# Patient Record
Sex: FEMALE | Race: WHITE | NOT HISPANIC OR LATINO | Employment: FULL TIME | URBAN - METROPOLITAN AREA
[De-identification: names, ages, dates, MRNs, and addresses within clinical notes are randomized per-mention and may not be internally consistent; named-entity substitution may affect disease eponyms.]

---

## 2022-01-01 ENCOUNTER — EXTERNAL RECORD (OUTPATIENT)
Dept: OTHER | Age: 27
End: 2022-01-01

## 2022-02-08 LAB
SARS-COV-2 AG UPPER RESP QL IA.RAPID: NEGATIVE
SARS-COV-2 RNA SPEC QL NAA+PROBE: NEGATIVE
SPECIMEN SOURCE: NORMAL
SPECIMEN SOURCE: NORMAL

## 2022-02-11 ENCOUNTER — ANESTHESIA (OUTPATIENT)
Dept: GASTROENTEROLOGY | Age: 27
End: 2022-02-11

## 2022-02-11 ENCOUNTER — HOSPITAL ENCOUNTER (OUTPATIENT)
Dept: GASTROENTEROLOGY | Age: 27
Discharge: HOME | End: 2022-02-11
Attending: INTERNAL MEDICINE

## 2022-02-11 ENCOUNTER — ANESTHESIA EVENT (OUTPATIENT)
Dept: GASTROENTEROLOGY | Age: 27
End: 2022-02-11

## 2022-02-11 VITALS
HEIGHT: 65 IN | SYSTOLIC BLOOD PRESSURE: 125 MMHG | OXYGEN SATURATION: 98 % | HEART RATE: 64 BPM | BODY MASS INDEX: 34.99 KG/M2 | DIASTOLIC BLOOD PRESSURE: 59 MMHG | TEMPERATURE: 97 F | RESPIRATION RATE: 20 BRPM | WEIGHT: 210 LBS

## 2022-02-11 DIAGNOSIS — R11.0 NAUSEA: ICD-10-CM

## 2022-02-11 DIAGNOSIS — R19.7 DIARRHEA, UNSPECIFIED TYPE: ICD-10-CM

## 2022-02-11 DIAGNOSIS — R10.84 ABDOMINAL PAIN, GENERALIZED: ICD-10-CM

## 2022-02-11 DIAGNOSIS — K21.9 GERD WITHOUT ESOPHAGITIS: ICD-10-CM

## 2022-02-11 DIAGNOSIS — R13.10 DYSPHAGIA, UNSPECIFIED TYPE: ICD-10-CM

## 2022-02-11 LAB
B-HCG UR QL: NEGATIVE
INTERNAL PROCEDURAL CONTROLS ACCEPTABLE: YES

## 2022-02-11 PROCEDURE — 13000028 HB GI MAJOR COMPLEX CASE S/U + 1ST 15 MIN

## 2022-02-11 PROCEDURE — 10002807 HB RX 258

## 2022-02-11 PROCEDURE — 10002800 HB RX 250 W HCPCS: Performed by: ANESTHESIOLOGY

## 2022-02-11 PROCEDURE — 88305 TISSUE EXAM BY PATHOLOGIST: CPT | Performed by: INTERNAL MEDICINE

## 2022-02-11 PROCEDURE — 81025 URINE PREGNANCY TEST: CPT | Performed by: INTERNAL MEDICINE

## 2022-02-11 PROCEDURE — 13000029 HB GI MAJOR COMPLEX CASE EA ADD MINUTE

## 2022-02-11 PROCEDURE — 13000001 HB PHASE II RECOVERY EA 30 MINUTES

## 2022-02-11 PROCEDURE — 10002807 HB RX 258: Performed by: ANESTHESIOLOGY

## 2022-02-11 PROCEDURE — 10002801 HB RX 250 W/O HCPCS: Performed by: ANESTHESIOLOGY

## 2022-02-11 PROCEDURE — 13000004 HB  ANESTHESIA  GENERAL OUTSIDE OR

## 2022-02-11 RX ORDER — MECLIZINE HYDROCHLORIDE 25 MG/1
25 TABLET ORAL
COMMUNITY

## 2022-02-11 RX ORDER — SODIUM CHLORIDE, SODIUM LACTATE, POTASSIUM CHLORIDE, CALCIUM CHLORIDE 600; 310; 30; 20 MG/100ML; MG/100ML; MG/100ML; MG/100ML
INJECTION, SOLUTION INTRAVENOUS CONTINUOUS PRN
Status: DISCONTINUED | OUTPATIENT
Start: 2022-02-11 | End: 2022-02-11

## 2022-02-11 RX ORDER — ACETAMINOPHEN 325 MG/1
650 TABLET ORAL EVERY 4 HOURS PRN
Status: DISCONTINUED | OUTPATIENT
Start: 2022-02-11 | End: 2022-02-13 | Stop reason: HOSPADM

## 2022-02-11 RX ORDER — SODIUM CHLORIDE, SODIUM LACTATE, POTASSIUM CHLORIDE, CALCIUM CHLORIDE 600; 310; 30; 20 MG/100ML; MG/100ML; MG/100ML; MG/100ML
INJECTION, SOLUTION INTRAVENOUS
Status: COMPLETED
Start: 2022-02-11 | End: 2022-02-11

## 2022-02-11 RX ORDER — ACETAMINOPHEN 650 MG/1
650 SUPPOSITORY RECTAL EVERY 4 HOURS PRN
Status: DISCONTINUED | OUTPATIENT
Start: 2022-02-11 | End: 2022-02-13 | Stop reason: HOSPADM

## 2022-02-11 RX ORDER — SUMATRIPTAN 100 MG/1
100 TABLET, FILM COATED ORAL
COMMUNITY

## 2022-02-11 RX ORDER — 0.9 % SODIUM CHLORIDE 0.9 %
2 VIAL (ML) INJECTION EVERY 12 HOURS SCHEDULED
Status: CANCELLED | OUTPATIENT
Start: 2022-02-11

## 2022-02-11 RX ORDER — PROPOFOL 10 MG/ML
INJECTION, EMULSION INTRAVENOUS PRN
Status: DISCONTINUED | OUTPATIENT
Start: 2022-02-11 | End: 2022-02-11

## 2022-02-11 RX ORDER — SODIUM CHLORIDE, SODIUM LACTATE, POTASSIUM CHLORIDE, CALCIUM CHLORIDE 600; 310; 30; 20 MG/100ML; MG/100ML; MG/100ML; MG/100ML
INJECTION, SOLUTION INTRAVENOUS ONCE
Status: COMPLETED | OUTPATIENT
Start: 2022-02-11 | End: 2022-02-11

## 2022-02-11 RX ORDER — CYCLOBENZAPRINE HCL 5 MG
5 TABLET ORAL
COMMUNITY

## 2022-02-11 RX ORDER — TOPIRAMATE 50 MG/1
50 TABLET, FILM COATED ORAL
COMMUNITY

## 2022-02-11 RX ORDER — SODIUM CHLORIDE, SODIUM LACTATE, POTASSIUM CHLORIDE, CALCIUM CHLORIDE 600; 310; 30; 20 MG/100ML; MG/100ML; MG/100ML; MG/100ML
INJECTION, SOLUTION INTRAVENOUS CONTINUOUS
Status: CANCELLED | OUTPATIENT
Start: 2022-02-11

## 2022-02-11 RX ORDER — LIDOCAINE HYDROCHLORIDE 10 MG/ML
INJECTION, SOLUTION INFILTRATION; PERINEURAL PRN
Status: DISCONTINUED | OUTPATIENT
Start: 2022-02-11 | End: 2022-02-11

## 2022-02-11 RX ORDER — LEVOTHYROXINE SODIUM 137 UG/1
137 TABLET ORAL DAILY
COMMUNITY

## 2022-02-11 RX ORDER — PROCHLORPERAZINE MALEATE 10 MG
10 TABLET ORAL
COMMUNITY

## 2022-02-11 RX ORDER — OMEPRAZOLE 10 MG/1
CAPSULE, DELAYED RELEASE ORAL
COMMUNITY

## 2022-02-11 RX ORDER — ONDANSETRON 2 MG/ML
4 INJECTION INTRAMUSCULAR; INTRAVENOUS 2 TIMES DAILY PRN
Status: DISCONTINUED | OUTPATIENT
Start: 2022-02-11 | End: 2022-02-13 | Stop reason: HOSPADM

## 2022-02-11 RX ADMIN — LIDOCAINE HYDROCHLORIDE 100 MG: 10 INJECTION, SOLUTION INFILTRATION; PERINEURAL at 08:28

## 2022-02-11 RX ADMIN — PROPOFOL 200 MG: 10 INJECTION, EMULSION INTRAVENOUS at 08:31

## 2022-02-11 RX ADMIN — SODIUM CHLORIDE, POTASSIUM CHLORIDE, SODIUM LACTATE AND CALCIUM CHLORIDE: 600; 310; 30; 20 INJECTION, SOLUTION INTRAVENOUS at 08:02

## 2022-02-11 RX ADMIN — SODIUM CHLORIDE, POTASSIUM CHLORIDE, SODIUM LACTATE AND CALCIUM CHLORIDE: 600; 310; 30; 20 INJECTION, SOLUTION INTRAVENOUS at 08:26

## 2022-02-11 RX ADMIN — SODIUM CHLORIDE, SODIUM LACTATE, POTASSIUM CHLORIDE, CALCIUM CHLORIDE: 600; 310; 30; 20 INJECTION, SOLUTION INTRAVENOUS at 08:02

## 2022-02-11 RX ADMIN — PROPOFOL 200 MCG/KG/MIN: 10 INJECTION, EMULSION INTRAVENOUS at 08:28

## 2022-02-11 SDOH — SOCIAL STABILITY: SOCIAL INSECURITY: RISK FACTORS: AGE

## 2022-02-11 ASSESSMENT — PAIN SCALES - GENERAL: PAINLEVEL_OUTOF10: 0

## 2022-02-11 ASSESSMENT — COPD QUESTIONNAIRES: COPD: 0

## 2022-02-11 ASSESSMENT — ACTIVITIES OF DAILY LIVING (ADL)
ADL_SCORE: 12
ADL_BEFORE_ADMISSION: INDEPENDENT
HISTORY OF FALLING IN THE LAST YEAR (PRIOR TO ADMISSION): NO

## 2022-02-11 ASSESSMENT — ENCOUNTER SYMPTOMS
SEIZURES: 0
HEADACHES: 1
EXERCISE TOLERANCE: GOOD (>4 METS)

## 2022-02-16 LAB
ASR DISCLAIMER: NORMAL
CASE RPRT: NORMAL
CLINICAL INFO: NORMAL
PATH REPORT.FINAL DX SPEC: NORMAL
PATH REPORT.GROSS SPEC: NORMAL

## 2022-02-17 ENCOUNTER — CLINICAL ABSTRACT (OUTPATIENT)
Dept: HEALTH INFORMATION MANAGEMENT | Facility: OTHER | Age: 27
End: 2022-02-17

## 2022-02-18 PROBLEM — F41.9 ANXIETY: Status: ACTIVE | Noted: 2022-02-18

## 2022-02-18 PROBLEM — G43.109 MIGRAINE WITH AURA AND WITHOUT STATUS MIGRAINOSUS, NOT INTRACTABLE: Status: ACTIVE | Noted: 2022-02-18

## 2022-02-18 NOTE — PROGRESS NOTES
Patient states headaches/migraines since sh was 5years old. Patient states 2-3 migraines a week. Patient states nausea or vomiting with migraines. Denies changes to speech or memory. Patient states decrease in balance. Denies recent falls. Denies recent head trauma. Patient states blurry vision. Pain in the eyes with migraines. Patient states migraines are starting in the neck area and will more upward towards the head.

## 2022-05-26 ENCOUNTER — TELEPHONE (OUTPATIENT)
Dept: NEUROLOGY | Facility: CLINIC | Age: 27
End: 2022-05-26

## 2022-06-10 NOTE — TELEPHONE ENCOUNTER
Per Epic review:      Authorization number: [id number]   Authorized from April 26, 2022 to May 26, 2023   Information entered manually     Informed pt via [address]

## 2022-06-16 ENCOUNTER — OFFICE VISIT (OUTPATIENT)
Dept: ENDOCRINOLOGY CLINIC | Facility: CLINIC | Age: 27
End: 2022-06-16
Payer: COMMERCIAL

## 2022-06-16 VITALS
WEIGHT: 232 LBS | DIASTOLIC BLOOD PRESSURE: 84 MMHG | BODY MASS INDEX: 39 KG/M2 | HEART RATE: 108 BPM | SYSTOLIC BLOOD PRESSURE: 124 MMHG

## 2022-06-16 DIAGNOSIS — E03.8 HYPOTHYROIDISM DUE TO HASHIMOTO'S THYROIDITIS: Primary | ICD-10-CM

## 2022-06-16 DIAGNOSIS — N91.4 SECONDARY OLIGOMENORRHEA: ICD-10-CM

## 2022-06-16 DIAGNOSIS — E06.3 HYPOTHYROIDISM DUE TO HASHIMOTO'S THYROIDITIS: Primary | ICD-10-CM

## 2022-06-16 PROCEDURE — 3079F DIAST BP 80-89 MM HG: CPT | Performed by: INTERNAL MEDICINE

## 2022-06-16 PROCEDURE — 99204 OFFICE O/P NEW MOD 45 MIN: CPT | Performed by: INTERNAL MEDICINE

## 2022-06-16 PROCEDURE — 3074F SYST BP LT 130 MM HG: CPT | Performed by: INTERNAL MEDICINE

## 2022-06-24 ENCOUNTER — LAB ENCOUNTER (OUTPATIENT)
Dept: LAB | Age: 27
End: 2022-06-24
Attending: INTERNAL MEDICINE
Payer: COMMERCIAL

## 2022-06-24 ENCOUNTER — HOSPITAL ENCOUNTER (OUTPATIENT)
Dept: ULTRASOUND IMAGING | Age: 27
Discharge: HOME | End: 2022-06-24
Attending: INTERNAL MEDICINE
Payer: COMMERCIAL

## 2022-06-24 DIAGNOSIS — E03.8 HYPOTHYROIDISM DUE TO HASHIMOTO'S THYROIDITIS: ICD-10-CM

## 2022-06-24 DIAGNOSIS — E06.3 HYPOTHYROIDISM DUE TO HASHIMOTO'S THYROIDITIS: ICD-10-CM

## 2022-06-24 DIAGNOSIS — N91.4 SECONDARY OLIGOMENORRHEA: ICD-10-CM

## 2022-06-24 LAB
ALBUMIN SERPL-MCNC: 3.7 G/DL (ref 3.4–5)
ALBUMIN/GLOB SERPL: 0.8 {RATIO} (ref 1–2)
ALP LIVER SERPL-CCNC: 111 U/L
ALT SERPL-CCNC: 83 U/L
ANION GAP SERPL CALC-SCNC: 4 MMOL/L (ref 0–18)
AST SERPL-CCNC: 40 U/L (ref 15–37)
BILIRUB SERPL-MCNC: 0.3 MG/DL (ref 0.1–2)
BUN BLD-MCNC: 10 MG/DL (ref 7–18)
BUN/CREAT SERPL: 12.8 (ref 10–20)
CALCIUM BLD-MCNC: 9.3 MG/DL (ref 8.5–10.1)
CHLORIDE SERPL-SCNC: 111 MMOL/L (ref 98–112)
CHOLEST SERPL-MCNC: 193 MG/DL (ref ?–200)
CO2 SERPL-SCNC: 24 MMOL/L (ref 21–32)
CORTIS SERPL-MCNC: 8.9 UG/DL
CREAT BLD-MCNC: 0.78 MG/DL
DHEA-S SERPL-MCNC: 78 UG/DL
FASTING PATIENT LIPID ANSWER: NO
FASTING STATUS PATIENT QL REPORTED: NO
GLOBULIN PLAS-MCNC: 4.4 G/DL (ref 2.8–4.4)
GLUCOSE BLD-MCNC: 107 MG/DL (ref 70–99)
HDLC SERPL-MCNC: 34 MG/DL (ref 40–59)
LDLC SERPL CALC-MCNC: 94 MG/DL (ref ?–100)
NONHDLC SERPL-MCNC: 159 MG/DL (ref ?–130)
OSMOLALITY SERPL CALC.SUM OF ELEC: 288 MOSM/KG (ref 275–295)
POTASSIUM SERPL-SCNC: 4 MMOL/L (ref 3.5–5.1)
PROLACTIN SERPL-MCNC: 12.4 NG/ML
PROT SERPL-MCNC: 8.1 G/DL (ref 6.4–8.2)
SODIUM SERPL-SCNC: 139 MMOL/L (ref 136–145)
T3FREE SERPL-MCNC: 2.46 PG/ML (ref 2.4–4.2)
T4 FREE SERPL-MCNC: 1 NG/DL (ref 0.8–1.7)
THYROPEROXIDASE AB SERPL-ACNC: 246 U/ML (ref ?–60)
TRIGL SERPL-MCNC: 387 MG/DL (ref 30–149)
TSI SER-ACNC: 2.14 MIU/ML (ref 0.36–3.74)
VIT D+METAB SERPL-MCNC: 38.4 NG/ML (ref 30–100)
VLDLC SERPL CALC-MCNC: 65 MG/DL (ref 0–30)

## 2022-06-24 PROCEDURE — 76536 US EXAM OF HEAD AND NECK: CPT | Performed by: INTERNAL MEDICINE

## 2022-06-24 PROCEDURE — 84443 ASSAY THYROID STIM HORMONE: CPT

## 2022-06-24 PROCEDURE — 80053 COMPREHEN METABOLIC PANEL: CPT

## 2022-06-24 PROCEDURE — 84402 ASSAY OF FREE TESTOSTERONE: CPT

## 2022-06-24 PROCEDURE — 36415 COLL VENOUS BLD VENIPUNCTURE: CPT

## 2022-06-24 PROCEDURE — 82306 VITAMIN D 25 HYDROXY: CPT

## 2022-06-24 PROCEDURE — 80061 LIPID PANEL: CPT

## 2022-06-24 PROCEDURE — 82627 DEHYDROEPIANDROSTERONE: CPT

## 2022-06-24 PROCEDURE — 82533 TOTAL CORTISOL: CPT

## 2022-06-24 PROCEDURE — 84439 ASSAY OF FREE THYROXINE: CPT

## 2022-06-24 PROCEDURE — 84403 ASSAY OF TOTAL TESTOSTERONE: CPT

## 2022-06-24 PROCEDURE — 86376 MICROSOMAL ANTIBODY EACH: CPT

## 2022-06-24 PROCEDURE — 84481 FREE ASSAY (FT-3): CPT

## 2022-06-24 PROCEDURE — 84146 ASSAY OF PROLACTIN: CPT

## 2022-07-11 LAB
TESTOSTERONE, FREE, S: 0.36 NG/DL
TESTOSTERONE, TOTAL, S: 11 NG/DL

## 2022-07-15 ENCOUNTER — OFFICE VISIT (OUTPATIENT)
Dept: ENDOCRINOLOGY CLINIC | Facility: CLINIC | Age: 27
End: 2022-07-15
Payer: COMMERCIAL

## 2022-07-15 VITALS — HEART RATE: 94 BPM | SYSTOLIC BLOOD PRESSURE: 129 MMHG | DIASTOLIC BLOOD PRESSURE: 85 MMHG

## 2022-07-15 DIAGNOSIS — E06.3 HYPOTHYROIDISM DUE TO HASHIMOTO'S THYROIDITIS: Primary | ICD-10-CM

## 2022-07-15 DIAGNOSIS — E88.81 METABOLIC SYNDROME: ICD-10-CM

## 2022-07-15 DIAGNOSIS — E03.8 HYPOTHYROIDISM DUE TO HASHIMOTO'S THYROIDITIS: Primary | ICD-10-CM

## 2022-07-15 DIAGNOSIS — R79.89 ELEVATED LFTS: ICD-10-CM

## 2022-07-15 PROCEDURE — 3074F SYST BP LT 130 MM HG: CPT | Performed by: INTERNAL MEDICINE

## 2022-07-15 PROCEDURE — 3079F DIAST BP 80-89 MM HG: CPT | Performed by: INTERNAL MEDICINE

## 2022-07-15 PROCEDURE — 99214 OFFICE O/P EST MOD 30 MIN: CPT | Performed by: INTERNAL MEDICINE

## 2022-07-15 RX ORDER — PHENTERMINE HYDROCHLORIDE 15 MG/1
15 CAPSULE ORAL EVERY MORNING
Qty: 30 CAPSULE | Refills: 2 | Status: SHIPPED | OUTPATIENT
Start: 2022-07-15

## 2022-07-15 RX ORDER — LEVOTHYROXINE SODIUM 137 UG/1
137 TABLET ORAL DAILY
Qty: 90 TABLET | Refills: 2 | Status: SHIPPED | OUTPATIENT
Start: 2022-07-15

## 2022-09-01 ENCOUNTER — OFFICE VISIT (OUTPATIENT)
Dept: NEUROLOGY | Facility: CLINIC | Age: 27
End: 2022-09-01
Payer: COMMERCIAL

## 2022-09-01 VITALS
HEART RATE: 94 BPM | RESPIRATION RATE: 16 BRPM | SYSTOLIC BLOOD PRESSURE: 126 MMHG | WEIGHT: 227 LBS | BODY MASS INDEX: 38 KG/M2 | DIASTOLIC BLOOD PRESSURE: 68 MMHG

## 2022-09-01 DIAGNOSIS — F41.9 ANXIETY: ICD-10-CM

## 2022-09-01 DIAGNOSIS — M54.2 CHRONIC NECK PAIN: ICD-10-CM

## 2022-09-01 DIAGNOSIS — G43.109 MIGRAINE WITH AURA AND WITHOUT STATUS MIGRAINOSUS, NOT INTRACTABLE: Primary | ICD-10-CM

## 2022-09-01 DIAGNOSIS — M26.609 TMJ (TEMPOROMANDIBULAR JOINT SYNDROME): ICD-10-CM

## 2022-09-01 DIAGNOSIS — G89.29 CHRONIC NECK PAIN: ICD-10-CM

## 2022-09-01 PROCEDURE — 3074F SYST BP LT 130 MM HG: CPT | Performed by: OTHER

## 2022-09-01 PROCEDURE — 3078F DIAST BP <80 MM HG: CPT | Performed by: OTHER

## 2022-09-01 PROCEDURE — 99214 OFFICE O/P EST MOD 30 MIN: CPT | Performed by: OTHER

## 2022-09-01 RX ORDER — TOPIRAMATE 50 MG/1
TABLET, FILM COATED ORAL
Qty: 120 TABLET | Refills: 5 | Status: SHIPPED | OUTPATIENT
Start: 2022-09-01

## 2022-09-01 NOTE — PROGRESS NOTES
Patient states she had more benefit with sumatriptan, patient states decrease benefit with eletriptan. Patient states migraines mostly start on the left side of her neck. Patient states decrease in frequency of migraines. Most about 2-3 times a week.

## 2022-09-12 ENCOUNTER — PATIENT MESSAGE (OUTPATIENT)
Dept: ENDOCRINOLOGY CLINIC | Facility: CLINIC | Age: 27
End: 2022-09-12

## 2022-09-12 ENCOUNTER — HOSPITAL ENCOUNTER (OUTPATIENT)
Dept: ULTRASOUND IMAGING | Age: 27
Discharge: HOME | End: 2022-09-12
Attending: INTERNAL MEDICINE
Payer: COMMERCIAL

## 2022-09-12 DIAGNOSIS — R79.89 ELEVATED LFTS: ICD-10-CM

## 2022-09-12 PROCEDURE — 76705 ECHO EXAM OF ABDOMEN: CPT | Performed by: INTERNAL MEDICINE

## 2022-09-12 RX ORDER — PHENTERMINE HYDROCHLORIDE 30 MG/1
30 CAPSULE ORAL EVERY MORNING
Qty: 30 CAPSULE | Refills: 1 | Status: SHIPPED | OUTPATIENT
Start: 2022-09-12

## 2022-09-12 NOTE — TELEPHONE ENCOUNTER
Dr. Lizzeth Vilchis    Please advise on patient message. Patient is currently taking phentermine 15 mg, daily.  She is also taking Topiramate 50 mg in the morning and 150 mg in the evening

## 2022-10-20 ENCOUNTER — OFFICE VISIT (OUTPATIENT)
Dept: ENDOCRINOLOGY CLINIC | Facility: CLINIC | Age: 27
End: 2022-10-20

## 2022-10-20 VITALS
BODY MASS INDEX: 37 KG/M2 | WEIGHT: 224 LBS | SYSTOLIC BLOOD PRESSURE: 140 MMHG | HEART RATE: 123 BPM | DIASTOLIC BLOOD PRESSURE: 90 MMHG

## 2022-10-20 DIAGNOSIS — E88.81 METABOLIC SYNDROME: ICD-10-CM

## 2022-10-20 DIAGNOSIS — E06.3 HYPOTHYROIDISM DUE TO HASHIMOTO'S THYROIDITIS: Primary | ICD-10-CM

## 2022-10-20 DIAGNOSIS — E03.8 HYPOTHYROIDISM DUE TO HASHIMOTO'S THYROIDITIS: Primary | ICD-10-CM

## 2022-10-20 DIAGNOSIS — E88.81 INSULIN RESISTANCE: ICD-10-CM

## 2022-10-20 RX ORDER — PHENTERMINE HYDROCHLORIDE 37.5 MG/1
37.5 CAPSULE ORAL EVERY MORNING
Qty: 90 CAPSULE | Refills: 0 | Status: SHIPPED | OUTPATIENT
Start: 2022-10-20

## 2022-10-27 ENCOUNTER — PATIENT MESSAGE (OUTPATIENT)
Dept: ENDOCRINOLOGY CLINIC | Facility: CLINIC | Age: 27
End: 2022-10-27

## 2022-11-01 RX ORDER — PHENTERMINE HYDROCHLORIDE 37.5 MG/1
37.5 CAPSULE ORAL EVERY MORNING
Qty: 90 CAPSULE | Refills: 0 | Status: SHIPPED | OUTPATIENT
Start: 2022-11-01

## 2022-11-01 NOTE — TELEPHONE ENCOUNTER
Received printed prescription order for Phentermine.  Placed on Dr [employer] desk for [employer]

## 2022-11-02 ENCOUNTER — PATIENT MESSAGE (OUTPATIENT)
Dept: ENDOCRINOLOGY CLINIC | Facility: CLINIC | Age: 27
End: 2022-11-02

## 2022-11-03 ENCOUNTER — HOSPITAL ENCOUNTER (EMERGENCY)
Facility: HOSPITAL | Age: 27
Discharge: LEFT BEFORE DISCHARGE | End: 2022-11-03
Payer: COMMERCIAL

## 2022-11-03 VITALS
TEMPERATURE: 98 F | WEIGHT: 220 LBS | RESPIRATION RATE: 18 BRPM | SYSTOLIC BLOOD PRESSURE: 135 MMHG | HEIGHT: 65 IN | OXYGEN SATURATION: 99 % | DIASTOLIC BLOOD PRESSURE: 89 MMHG | BODY MASS INDEX: 36.65 KG/M2 | HEART RATE: 99 BPM

## 2022-11-03 NOTE — TELEPHONE ENCOUNTER
From: Aron Zamora  To: Teodoro Dan MD  Sent: 11/2/2022 5:28 PM CDT  Subject: Medication Request    Hi,   I am reaching out again as I have not received a reply nor has my [address] received an order. [facility] cannot fill my prescription because my insurance does not cover it. Can Dr. Zehra Duarte please send the order of Phrentermine to my [address] like before and I will use my RX coupons to pay for it.       Thank you,   Marcelo Given  [phone]

## 2022-11-13 ENCOUNTER — PATIENT MESSAGE (OUTPATIENT)
Dept: NEUROLOGY | Facility: CLINIC | Age: 27
End: 2022-11-13

## 2022-11-14 RX ORDER — AMOXICILLIN AND CLAVULANATE POTASSIUM 875; 125 MG/1; MG/1
1 TABLET, FILM COATED ORAL 2 TIMES DAILY
COMMUNITY
Start: 2022-10-11

## 2022-11-14 RX ORDER — KETOROLAC TROMETHAMINE 10 MG/1
TABLET, FILM COATED ORAL
COMMUNITY
Start: 2022-09-25

## 2022-11-14 RX ORDER — ONDANSETRON 4 MG/1
4 TABLET, ORALLY DISINTEGRATING ORAL EVERY 8 HOURS PRN
COMMUNITY
Start: 2022-09-25

## 2022-11-14 RX ORDER — TAMSULOSIN HYDROCHLORIDE 0.4 MG/1
0.4 CAPSULE ORAL DAILY
COMMUNITY
Start: 2022-09-25

## 2022-11-14 NOTE — TELEPHONE ENCOUNTER
From: Júnior Gaytan  To: Misha Pedro DO  Sent: 11/13/2022 9:10 PM CST  Subject: Medication Refill Question    Hi,   I was prescribed Sertraline 50mg tabs by my primary care provider awhile ago. He was the one who would refill my orders but I am in the process of switching my primary care doctor and I do not wish to contact that office or see that doctor. Dr. Doni Dodson wanted me to continue the Sertraline because she said it was good for migraines as well. Would it be possible to have Dr. Kacey León refill my prescription for Sertraline? I get it sent to [facility]. Please let me know if you have any questions!      Thanks,   Festus Muniz  [phone]

## 2022-11-18 ENCOUNTER — LAB ENCOUNTER (OUTPATIENT)
Dept: LAB | Facility: HOSPITAL | Age: 27
End: 2022-11-18
Attending: INTERNAL MEDICINE
Payer: COMMERCIAL

## 2022-11-18 DIAGNOSIS — E88.81 METABOLIC SYNDROME: ICD-10-CM

## 2022-11-18 LAB
ALBUMIN SERPL-MCNC: 3.9 G/DL (ref 3.4–5)
ALBUMIN/GLOB SERPL: 0.9 {RATIO} (ref 1–2)
ALP LIVER SERPL-CCNC: 120 U/L
ALT SERPL-CCNC: 75 U/L
ANION GAP SERPL CALC-SCNC: 7 MMOL/L (ref 0–18)
AST SERPL-CCNC: 33 U/L (ref 15–37)
BILIRUB SERPL-MCNC: 0.4 MG/DL (ref 0.1–2)
BUN BLD-MCNC: 14 MG/DL (ref 7–18)
BUN/CREAT SERPL: 16.7 (ref 10–20)
CALCIUM BLD-MCNC: 9.9 MG/DL (ref 8.5–10.1)
CHLORIDE SERPL-SCNC: 109 MMOL/L (ref 98–112)
CO2 SERPL-SCNC: 22 MMOL/L (ref 21–32)
CREAT BLD-MCNC: 0.84 MG/DL
FASTING STATUS PATIENT QL REPORTED: YES
GFR SERPLBLD BASED ON 1.73 SQ M-ARVRAT: 99 ML/MIN/1.73M2 (ref 60–?)
GLOBULIN PLAS-MCNC: 4.3 G/DL (ref 2.8–4.4)
GLUCOSE BLD-MCNC: 94 MG/DL (ref 70–99)
INSULIN SERPL-ACNC: 71.2 MU/L (ref 3–25)
OSMOLALITY SERPL CALC.SUM OF ELEC: 286 MOSM/KG (ref 275–295)
POTASSIUM SERPL-SCNC: 4 MMOL/L (ref 3.5–5.1)
PROT SERPL-MCNC: 8.2 G/DL (ref 6.4–8.2)
SODIUM SERPL-SCNC: 138 MMOL/L (ref 136–145)

## 2022-11-18 PROCEDURE — 36415 COLL VENOUS BLD VENIPUNCTURE: CPT

## 2022-11-18 PROCEDURE — 83525 ASSAY OF INSULIN: CPT

## 2022-11-18 PROCEDURE — 80053 COMPREHEN METABOLIC PANEL: CPT

## 2022-12-10 ENCOUNTER — PATIENT MESSAGE (OUTPATIENT)
Dept: NEUROLOGY | Facility: CLINIC | Age: 27
End: 2022-12-10

## 2022-12-12 RX ORDER — SUMATRIPTAN 25 MG/1
TABLET, FILM COATED ORAL
Qty: 9 TABLET | Refills: 0 | Status: SHIPPED | OUTPATIENT
Start: 2022-12-12

## 2023-02-01 ENCOUNTER — TELEPHONE (OUTPATIENT)
Dept: FAMILY MEDICINE CLINIC | Facility: CLINIC | Age: 28
End: 2023-02-01

## 2023-02-01 NOTE — TELEPHONE ENCOUNTER
Approved    Prior authorization approved Case ID: [id number]      Payer:  [address]   [phone]  Thorp Sammi    [id number];[id number]; Review Type:Prior Auth; Coverage Start Date:01/02/2023; Coverage End Date:02/01/2024;    Approval Details    Authorized from January 2, 2023 to February 1, 2024      Electronic appeal:  Not supported   [employer]

## 2023-02-06 ENCOUNTER — PATIENT MESSAGE (OUTPATIENT)
Dept: ENDOCRINOLOGY CLINIC | Facility: CLINIC | Age: 28
End: 2023-02-06

## 2023-02-06 ENCOUNTER — PATIENT MESSAGE (OUTPATIENT)
Dept: FAMILY MEDICINE CLINIC | Facility: CLINIC | Age: 28
End: 2023-02-06

## 2023-02-06 NOTE — TELEPHONE ENCOUNTER
From: Arianna Cai  To: Jake Callaway MD  Sent: 2/6/2023 1:28 PM CST  Subject: Phentermine    Hi,  I ran out of my Phentermine. Should I refill my prescription for Phentermine for now or is there a new medication you want me start? I have an upcoming appointment with you at the end of the month. Let me know.      Thanks,   Noah Infante  [phone]

## 2023-02-07 RX ORDER — METHYLPREDNISOLONE 4 MG/1
TABLET ORAL
Qty: 21 EACH | Refills: 0 | Status: SHIPPED | OUTPATIENT
Start: 2023-02-07

## 2023-02-10 RX ORDER — PHENTERMINE HYDROCHLORIDE 37.5 MG/1
37.5 CAPSULE ORAL EVERY MORNING
Qty: 90 CAPSULE | Refills: 0 | Status: SHIPPED | OUTPATIENT
Start: 2023-02-10

## 2023-02-24 ENCOUNTER — PATIENT MESSAGE (OUTPATIENT)
Dept: NEUROLOGY | Facility: CLINIC | Age: 28
End: 2023-02-24

## 2023-02-24 ENCOUNTER — OFFICE VISIT (OUTPATIENT)
Dept: NEUROLOGY | Facility: CLINIC | Age: 28
End: 2023-02-24
Payer: COMMERCIAL

## 2023-02-24 VITALS
WEIGHT: 219 LBS | HEART RATE: 118 BPM | RESPIRATION RATE: 16 BRPM | SYSTOLIC BLOOD PRESSURE: 130 MMHG | BODY MASS INDEX: 37 KG/M2 | DIASTOLIC BLOOD PRESSURE: 62 MMHG

## 2023-02-24 DIAGNOSIS — Z53.8 APPOINTMENT CANCELED BY HOSPITAL: Primary | ICD-10-CM

## 2023-02-24 PROCEDURE — 3075F SYST BP GE 130 - 139MM HG: CPT | Performed by: OTHER

## 2023-02-24 PROCEDURE — 3078F DIAST BP <80 MM HG: CPT | Performed by: OTHER

## 2023-02-24 NOTE — PROGRESS NOTES
Patient states still having episodes of migraines and its hard to control and other times it is easier. Patient states starting Nurtec. Patient states still having left sided neck pain.

## 2023-02-27 ENCOUNTER — OFFICE VISIT (OUTPATIENT)
Dept: ENDOCRINOLOGY CLINIC | Facility: CLINIC | Age: 28
End: 2023-02-27

## 2023-02-27 VITALS
SYSTOLIC BLOOD PRESSURE: 129 MMHG | BODY MASS INDEX: 37 KG/M2 | DIASTOLIC BLOOD PRESSURE: 87 MMHG | HEART RATE: 116 BPM | WEIGHT: 220 LBS

## 2023-02-27 DIAGNOSIS — E66.9 OBESITY (BMI 30-39.9): ICD-10-CM

## 2023-02-27 DIAGNOSIS — E06.3 HYPOTHYROIDISM DUE TO HASHIMOTO'S THYROIDITIS: Primary | ICD-10-CM

## 2023-02-27 DIAGNOSIS — E03.8 HYPOTHYROIDISM DUE TO HASHIMOTO'S THYROIDITIS: Primary | ICD-10-CM

## 2023-02-27 DIAGNOSIS — K75.81 NASH (NONALCOHOLIC STEATOHEPATITIS): ICD-10-CM

## 2023-02-27 PROCEDURE — 3079F DIAST BP 80-89 MM HG: CPT | Performed by: INTERNAL MEDICINE

## 2023-02-27 PROCEDURE — 3074F SYST BP LT 130 MM HG: CPT | Performed by: INTERNAL MEDICINE

## 2023-02-27 PROCEDURE — 99214 OFFICE O/P EST MOD 30 MIN: CPT | Performed by: INTERNAL MEDICINE

## 2023-02-27 RX ORDER — LEVOTHYROXINE SODIUM 137 UG/1
137 TABLET ORAL DAILY
Qty: 90 TABLET | Refills: 2 | Status: SHIPPED | OUTPATIENT
Start: 2023-02-27

## 2023-02-27 RX ORDER — MEDROXYPROGESTERONE ACETATE 10 MG/1
10 TABLET ORAL DAILY
Qty: 5 TABLET | Refills: 0 | Status: SHIPPED | OUTPATIENT
Start: 2023-02-27

## 2023-02-27 RX ORDER — SEMAGLUTIDE 0.5 MG/.5ML
0.5 INJECTION, SOLUTION SUBCUTANEOUS WEEKLY
Qty: 4 EACH | Refills: 0 | Status: SHIPPED | OUTPATIENT
Start: 2023-02-27

## 2023-02-27 RX ORDER — SEMAGLUTIDE 1 MG/.5ML
1 INJECTION, SOLUTION SUBCUTANEOUS WEEKLY
Qty: 4 EACH | Refills: 0 | Status: SHIPPED | OUTPATIENT
Start: 2023-03-27

## 2023-02-27 RX ORDER — SEMAGLUTIDE 0.25 MG/.5ML
0.25 INJECTION, SOLUTION SUBCUTANEOUS WEEKLY
Qty: 4 EACH | Refills: 0 | COMMUNITY
Start: 2023-02-27

## 2023-02-27 RX ORDER — PHENTERMINE HYDROCHLORIDE 15 MG/1
15 CAPSULE ORAL EVERY MORNING
Qty: 30 CAPSULE | Refills: 1 | Status: SHIPPED | OUTPATIENT
Start: 2023-02-27

## 2023-02-27 NOTE — PATIENT INSTRUCTIONS
START Wegovy 0.25mg subcutaneous weekly for one month then increase to 0.5mg subcutaneous weekly for one month then increase to 1mg subcutaneous weekly -->then contact clinic for next dose

## 2023-03-04 DIAGNOSIS — F41.9 ANXIETY: ICD-10-CM

## 2023-03-06 NOTE — TELEPHONE ENCOUNTER
Office was to call back and offer her another appointment, I can add on at end of day. Waited long and I saw the patient after her, first, accidentally.

## 2023-03-06 NOTE — TELEPHONE ENCOUNTER
MLTCB on VM (ok per HIPAA consent) - instructed to tell PSR she is returning nurse call so that nurse can assist her with scheduling an appointment with Dr Mary Caruso- see Dr Mary Caruso note.

## 2023-03-07 ENCOUNTER — PATIENT MESSAGE (OUTPATIENT)
Dept: ENDOCRINOLOGY CLINIC | Facility: CLINIC | Age: 28
End: 2023-03-07

## 2023-03-07 DIAGNOSIS — E88.81 METABOLIC SYNDROME: ICD-10-CM

## 2023-03-07 DIAGNOSIS — E66.9 OBESITY (BMI 30-39.9): Primary | ICD-10-CM

## 2023-03-07 DIAGNOSIS — E88.81 INSULIN RESISTANCE: ICD-10-CM

## 2023-03-09 RX ORDER — SEMAGLUTIDE 0.5 MG/.5ML
0.5 INJECTION, SOLUTION SUBCUTANEOUS WEEKLY
Qty: 4 EACH | Refills: 0 | Status: SHIPPED | OUTPATIENT
Start: 2023-03-09 | End: 2023-03-10

## 2023-03-09 RX ORDER — SEMAGLUTIDE 1 MG/.5ML
1 INJECTION, SOLUTION SUBCUTANEOUS WEEKLY
Qty: 4 EACH | Refills: 0 | Status: SHIPPED | OUTPATIENT
Start: 2023-03-27 | End: 2023-03-10

## 2023-03-10 RX ORDER — SEMAGLUTIDE 1.34 MG/ML
0.5 INJECTION, SOLUTION SUBCUTANEOUS WEEKLY
Qty: 4.5 ML | Refills: 1 | Status: SHIPPED | OUTPATIENT
Start: 2023-03-10

## 2023-03-10 NOTE — TELEPHONE ENCOUNTER
Dr Sandoval Able-- see pt message. Costing the same amount through [address] and is not covered under pt plan. Please advise.

## 2023-03-10 NOTE — ADDENDUM NOTE
Addended Nita Richmond on: 3/10/2023 08:16 AM     Modules accepted: Orders
Dr. Conklin
NASAL CONGESTION

## 2023-03-15 ENCOUNTER — MED REC SCAN ONLY (OUTPATIENT)
Dept: ENDOCRINOLOGY CLINIC | Facility: CLINIC | Age: 28
End: 2023-03-15

## 2023-03-17 ENCOUNTER — OFFICE VISIT (OUTPATIENT)
Dept: NEUROLOGY | Facility: CLINIC | Age: 28
End: 2023-03-17
Payer: COMMERCIAL

## 2023-03-17 ENCOUNTER — TELEPHONE (OUTPATIENT)
Dept: NEUROLOGY | Facility: CLINIC | Age: 28
End: 2023-03-17

## 2023-03-17 VITALS
DIASTOLIC BLOOD PRESSURE: 78 MMHG | SYSTOLIC BLOOD PRESSURE: 118 MMHG | HEART RATE: 68 BPM | HEIGHT: 64.75 IN | BODY MASS INDEX: 37.1 KG/M2 | WEIGHT: 220 LBS | RESPIRATION RATE: 16 BRPM

## 2023-03-17 DIAGNOSIS — F41.9 ANXIETY: ICD-10-CM

## 2023-03-17 DIAGNOSIS — M26.609 TMJ (TEMPOROMANDIBULAR JOINT SYNDROME): ICD-10-CM

## 2023-03-17 DIAGNOSIS — G43.109 MIGRAINE WITH AURA AND WITHOUT STATUS MIGRAINOSUS, NOT INTRACTABLE: Primary | ICD-10-CM

## 2023-03-17 DIAGNOSIS — M54.2 CHRONIC NECK PAIN: ICD-10-CM

## 2023-03-17 DIAGNOSIS — G89.29 CHRONIC NECK PAIN: ICD-10-CM

## 2023-03-17 PROCEDURE — 99214 OFFICE O/P EST MOD 30 MIN: CPT | Performed by: OTHER

## 2023-03-17 PROCEDURE — 3008F BODY MASS INDEX DOCD: CPT | Performed by: OTHER

## 2023-03-17 PROCEDURE — 3074F SYST BP LT 130 MM HG: CPT | Performed by: OTHER

## 2023-03-17 PROCEDURE — 3078F DIAST BP <80 MM HG: CPT | Performed by: OTHER

## 2023-03-17 RX ORDER — GALCANEZUMAB 120 MG/ML
120 INJECTION, SOLUTION SUBCUTANEOUS
Qty: 1.12 ML | Refills: 1 | Status: SHIPPED | OUTPATIENT
Start: 2023-04-17 | End: 2024-04-16

## 2023-03-17 RX ORDER — GALCANEZUMAB 120 MG/ML
INJECTION, SOLUTION SUBCUTANEOUS
Qty: 2 EACH | Refills: 0 | Status: SHIPPED | OUTPATIENT
Start: 2023-03-17 | End: 2023-04-14

## 2023-03-17 RX ORDER — TOPIRAMATE 50 MG/1
TABLET, FILM COATED ORAL
Qty: 120 TABLET | Refills: 5 | Status: SHIPPED | OUTPATIENT
Start: 2023-03-17

## 2023-03-17 RX ORDER — GALCANEZUMAB 120 MG/ML
120 INJECTION, SOLUTION SUBCUTANEOUS
Qty: 1.12 ML | Refills: 0 | Status: SHIPPED | OUTPATIENT
Start: 2023-03-17 | End: 2023-03-17

## 2023-03-17 RX ORDER — SUMATRIPTAN 100 MG/1
TABLET, FILM COATED ORAL
Qty: 9 TABLET | Refills: 0 | Status: SHIPPED | OUTPATIENT
Start: 2023-03-17

## 2023-03-17 NOTE — PROGRESS NOTES
WHAT WAS ORDERED  Your doctor has prescribed EMGALITY as a preventative medication for your migraines. This medication is an injection that you will give to yourself at home once every 30 days. HOW TO GET THE MEDICATION  The prescription for this medication was sent directly to your preferred pharmacy. This medication may require a Prior Authorization (PA) with your insurance. Our office will initiate this. PLEASE WAIT 3-4 BUSINESS DAYS before going to the pharmacy to  the medication to allow time for the PA to be completed. If you have [facility] the marinanow Card before you go to the pharmacy. Follow the directions on the card to activate online, by phone, or text. Present this card to the pharmacist when you  your medication. This card may provide medication at no cost if your insurance does not cover the medication or requires a PA, or provide copay assistance if the medication is covered. If you have [address] (Medicare, Medicaid, , etc)  If your insurance does not cover the medication, another medication might be prescribed. TAKING THE MEDICATION  Please visit [email or web address] or text INFO to 65003 to view a training video on how to perform the self-injection. ANY QUESTIONS?   PLEASE CALL OUR OFFICE AT   [phone], OPTION #1.

## 2023-03-17 NOTE — PROGRESS NOTES
Emgality 120mg/mL prefilled pen  Initial loading dose of 240mg (2 pens) followed by 120mg dose given subcutaneously every 30 days    Emgality Questionnaire:      Year of initial migrain`e onset? 2002  Does patient have more than 15 headache days a month? yes   How many days monthly? 30  Do headaches last 4 hours or more per day? yes  Have headaches persisted with this frequency for 3 months or more? yes    Has the patient received Botox injections for migraine prophylaxis in the past 4 months?  no  If no, will the patient be starting treatment with Botox after starting therapy with Aimovig?  no    Any ER visits due to migraines? yes  If yes, provide date   Disability due to headache? Has patient missed  Work?  yes 2  School? yes (4-5)    List two different abortive medications patient has tried, noting dates used, dosing, and response to each (effective, intolerant, contraindicated or failed)  May include NSAIDs, steriods, triptans, narcotic pain relievers, OTC preparations    1. Excedrin, Tylenol, Aleve, Ibuprofen  2. Sumatriptan, Rizatriptan, Nurtec    List two different prophylactic medications from two different therapeutic drug classes that patient has tried, noting dates used, dosing, and response to each (effective, intolerant, contraindicated or failed)    1. Topamax,   2. Amitriptyline    Explained prior authorization timeline and process, including that her insurance may require trial and failure of additional medication.

## 2023-03-24 ENCOUNTER — PATIENT MESSAGE (OUTPATIENT)
Dept: NEUROLOGY | Facility: CLINIC | Age: 28
End: 2023-03-24

## 2023-03-24 ENCOUNTER — PATIENT MESSAGE (OUTPATIENT)
Dept: ENDOCRINOLOGY CLINIC | Facility: CLINIC | Age: 28
End: 2023-03-24

## 2023-03-24 DIAGNOSIS — G43.109 MIGRAINE WITH AURA AND WITHOUT STATUS MIGRAINOSUS, NOT INTRACTABLE: ICD-10-CM

## 2023-03-24 RX ORDER — MEDROXYPROGESTERONE ACETATE 10 MG/1
10 TABLET ORAL DAILY
Qty: 5 TABLET | Refills: 0 | Status: SHIPPED | OUTPATIENT
Start: 2023-03-24

## 2023-03-27 RX ORDER — GALCANEZUMAB 120 MG/ML
INJECTION, SOLUTION SUBCUTANEOUS
Qty: 2 EACH | Refills: 0 | Status: SHIPPED | OUTPATIENT
Start: 2023-03-27 | End: 2023-04-24

## 2023-03-27 NOTE — TELEPHONE ENCOUNTER
Spoke with [facility] pharmacist (July) who states we can send in prescription for the loading dose and Raymond can reverse charge for [facility] she received over the weekend and put charge through for the loading dose but patient would only receive 1 pen. If that does not work, patient can use savings card to cover additional pen. [facility] does not have Emgality in stock and once prescription is received, the [facility] should arrive at [facility].

## 2023-03-30 ENCOUNTER — PATIENT MESSAGE (OUTPATIENT)
Dept: FAMILY MEDICINE CLINIC | Facility: CLINIC | Age: 28
End: 2023-03-30

## 2023-03-30 ENCOUNTER — LAB ENCOUNTER (OUTPATIENT)
Dept: LAB | Facility: HOSPITAL | Age: 28
End: 2023-03-30
Attending: FAMILY MEDICINE
Payer: COMMERCIAL

## 2023-03-30 DIAGNOSIS — E06.3 HYPOTHYROIDISM DUE TO HASHIMOTO'S THYROIDITIS: ICD-10-CM

## 2023-03-30 DIAGNOSIS — E03.8 HYPOTHYROIDISM DUE TO HASHIMOTO'S THYROIDITIS: ICD-10-CM

## 2023-03-30 DIAGNOSIS — K75.81 NASH (NONALCOHOLIC STEATOHEPATITIS): ICD-10-CM

## 2023-03-30 DIAGNOSIS — K58.0 IRRITABLE BOWEL SYNDROME WITH DIARRHEA: ICD-10-CM

## 2023-03-30 LAB
ALBUMIN SERPL-MCNC: 3.7 G/DL (ref 3.4–5)
ALBUMIN/GLOB SERPL: 0.9 {RATIO} (ref 1–2)
ALP LIVER SERPL-CCNC: 112 U/L
ALT SERPL-CCNC: 56 U/L
ANION GAP SERPL CALC-SCNC: 6 MMOL/L (ref 0–18)
AST SERPL-CCNC: 22 U/L (ref 15–37)
BILIRUB SERPL-MCNC: 0.5 MG/DL (ref 0.1–2)
BUN BLD-MCNC: 11 MG/DL (ref 7–18)
BUN/CREAT SERPL: 13.3 (ref 10–20)
CALCIUM BLD-MCNC: 9.7 MG/DL (ref 8.5–10.1)
CHLORIDE SERPL-SCNC: 110 MMOL/L (ref 98–112)
CLAM IGE QN: <0.1 KUA/L (ref ?–0.1)
CO2 SERPL-SCNC: 24 MMOL/L (ref 21–32)
CODFISH IGE QN: <0.1 KUA/L (ref ?–0.1)
CORN IGE QN: 0.53 KUA/L (ref ?–0.1)
COW MILK IGE QN: 0.18 KUA/L (ref ?–0.1)
CREAT BLD-MCNC: 0.83 MG/DL
EGG WHITE IGE QN: 0.17 KUA/L (ref ?–0.1)
FASTING STATUS PATIENT QL REPORTED: YES
GFR SERPLBLD BASED ON 1.73 SQ M-ARVRAT: 100 ML/MIN/1.73M2 (ref 60–?)
GLOBULIN PLAS-MCNC: 4.3 G/DL (ref 2.8–4.4)
GLUCOSE BLD-MCNC: 86 MG/DL (ref 70–99)
IGE SERPL-ACNC: 546 KU/L (ref 2–214)
OSMOLALITY SERPL CALC.SUM OF ELEC: 289 MOSM/KG (ref 275–295)
PEANUT IGE QN: 1.41 KUA/L (ref ?–0.1)
POTASSIUM SERPL-SCNC: 4.4 MMOL/L (ref 3.5–5.1)
PROT SERPL-MCNC: 8 G/DL (ref 6.4–8.2)
SCALLOP IGE QN: <0.1 KUA/L (ref ?–0.1)
SESAME SEED IGE QN: 0.43 KUA/L (ref ?–0.1)
SHRIMP IGE QN: <0.1 KUA/L (ref ?–0.1)
SODIUM SERPL-SCNC: 140 MMOL/L (ref 136–145)
SOYBEAN IGE QN: 0.67 KUA/L (ref ?–0.1)
T4 FREE SERPL-MCNC: 1 NG/DL (ref 0.8–1.7)
TSI SER-ACNC: 2.41 MIU/ML (ref 0.36–3.74)
WALNUT IGE QN: 1.88 KUA/L (ref ?–0.1)
WHEAT IGE QN: 0.26 KUA/L (ref ?–0.1)

## 2023-03-30 PROCEDURE — 84402 ASSAY OF FREE TESTOSTERONE: CPT

## 2023-03-30 PROCEDURE — 36415 COLL VENOUS BLD VENIPUNCTURE: CPT

## 2023-03-30 PROCEDURE — 86003 ALLG SPEC IGE CRUDE XTRC EA: CPT

## 2023-03-30 PROCEDURE — 84443 ASSAY THYROID STIM HORMONE: CPT

## 2023-03-30 PROCEDURE — 82785 ASSAY OF IGE: CPT

## 2023-03-30 PROCEDURE — 84439 ASSAY OF FREE THYROXINE: CPT

## 2023-03-30 PROCEDURE — 80053 COMPREHEN METABOLIC PANEL: CPT

## 2023-03-30 PROCEDURE — 84403 ASSAY OF TOTAL TESTOSTERONE: CPT

## 2023-03-31 NOTE — PROGRESS NOTES
Toño Lowry - You have numerous allergies - still peanut and walnut are the most prominent. - Dr. Cele Beverly

## 2023-04-04 LAB
TESTOSTERONE, FREE, S: 1.16 NG/DL
TESTOSTERONE, TOTAL, S: 22 NG/DL

## 2023-04-11 ENCOUNTER — OFFICE VISIT (OUTPATIENT)
Dept: OBGYN CLINIC | Facility: CLINIC | Age: 28
End: 2023-04-11
Payer: COMMERCIAL

## 2023-04-11 VITALS
DIASTOLIC BLOOD PRESSURE: 76 MMHG | WEIGHT: 219.81 LBS | HEIGHT: 64.75 IN | SYSTOLIC BLOOD PRESSURE: 118 MMHG | BODY MASS INDEX: 37.07 KG/M2

## 2023-04-11 DIAGNOSIS — Z12.4 PAP SMEAR FOR CERVICAL CANCER SCREENING: ICD-10-CM

## 2023-04-11 DIAGNOSIS — Z11.3 SCREEN FOR STD (SEXUALLY TRANSMITTED DISEASE): ICD-10-CM

## 2023-04-11 DIAGNOSIS — E66.9 CLASS 2 OBESITY WITH BODY MASS INDEX (BMI) OF 36.0 TO 36.9 IN ADULT, UNSPECIFIED OBESITY TYPE, UNSPECIFIED WHETHER SERIOUS COMORBIDITY PRESENT: ICD-10-CM

## 2023-04-11 DIAGNOSIS — N92.6 IRREGULAR MENSES: Primary | ICD-10-CM

## 2023-04-11 DIAGNOSIS — N91.2 AMENORRHEA: ICD-10-CM

## 2023-04-11 DIAGNOSIS — E28.2 PCOS (POLYCYSTIC OVARIAN SYNDROME): ICD-10-CM

## 2023-04-11 LAB
CONTROL LINE PRESENT WITH A CLEAR BACKGROUND (YES/NO): YES YES/NO
PREGNANCY TEST, URINE: NEGATIVE

## 2023-04-11 PROCEDURE — 81025 URINE PREGNANCY TEST: CPT | Performed by: OBSTETRICS & GYNECOLOGY

## 2023-04-11 PROCEDURE — 87591 N.GONORRHOEAE DNA AMP PROB: CPT | Performed by: OBSTETRICS & GYNECOLOGY

## 2023-04-11 PROCEDURE — 3008F BODY MASS INDEX DOCD: CPT | Performed by: OBSTETRICS & GYNECOLOGY

## 2023-04-11 PROCEDURE — 3074F SYST BP LT 130 MM HG: CPT | Performed by: OBSTETRICS & GYNECOLOGY

## 2023-04-11 PROCEDURE — 87491 CHLMYD TRACH DNA AMP PROBE: CPT | Performed by: OBSTETRICS & GYNECOLOGY

## 2023-04-11 PROCEDURE — 3078F DIAST BP <80 MM HG: CPT | Performed by: OBSTETRICS & GYNECOLOGY

## 2023-04-11 PROCEDURE — 99204 OFFICE O/P NEW MOD 45 MIN: CPT | Performed by: OBSTETRICS & GYNECOLOGY

## 2023-04-12 LAB
C TRACH DNA SPEC QL NAA+PROBE: NEGATIVE
N GONORRHOEA DNA SPEC QL NAA+PROBE: NEGATIVE

## 2023-04-14 RX ORDER — PHENTERMINE HYDROCHLORIDE 37.5 MG/1
37.5 CAPSULE ORAL EVERY MORNING
Qty: 30 CAPSULE | Refills: 2 | Status: SHIPPED | OUTPATIENT
Start: 2023-04-14

## 2023-04-14 NOTE — TELEPHONE ENCOUNTER
LOV; 02/27/23    RTC;6months    F/U; 08/28/23     Pending Monthly Supply; order pending, approve if appropriate.

## 2023-04-20 ENCOUNTER — LAB ENCOUNTER (OUTPATIENT)
Dept: LAB | Facility: HOSPITAL | Age: 28
End: 2023-04-20
Attending: OBSTETRICS & GYNECOLOGY
Payer: COMMERCIAL

## 2023-04-20 DIAGNOSIS — E66.9 CLASS 2 OBESITY WITH BODY MASS INDEX (BMI) OF 36.0 TO 36.9 IN ADULT, UNSPECIFIED OBESITY TYPE, UNSPECIFIED WHETHER SERIOUS COMORBIDITY PRESENT: ICD-10-CM

## 2023-04-20 DIAGNOSIS — N91.2 AMENORRHEA: ICD-10-CM

## 2023-04-20 LAB
EST. AVERAGE GLUCOSE BLD GHB EST-MCNC: 114 MG/DL (ref 68–126)
FSH SERPL-ACNC: 5.7 MIU/ML
HBA1C MFR BLD: 5.6 % (ref ?–5.7)

## 2023-04-20 PROCEDURE — 83001 ASSAY OF GONADOTROPIN (FSH): CPT | Performed by: OBSTETRICS & GYNECOLOGY

## 2023-04-20 PROCEDURE — 83036 HEMOGLOBIN GLYCOSYLATED A1C: CPT | Performed by: OBSTETRICS & GYNECOLOGY

## 2023-04-21 ENCOUNTER — PATIENT MESSAGE (OUTPATIENT)
Dept: OBGYN CLINIC | Facility: CLINIC | Age: 28
End: 2023-04-21

## 2023-04-28 ENCOUNTER — APPOINTMENT (OUTPATIENT)
Dept: CT IMAGING | Age: 28
End: 2023-04-28
Attending: EMERGENCY MEDICINE
Payer: COMMERCIAL

## 2023-04-28 ENCOUNTER — HOSPITAL ENCOUNTER (OUTPATIENT)
Age: 28
Discharge: HOME | End: 2023-04-28
Attending: EMERGENCY MEDICINE
Payer: COMMERCIAL

## 2023-04-28 ENCOUNTER — NURSE TRIAGE (OUTPATIENT)
Dept: FAMILY MEDICINE CLINIC | Facility: CLINIC | Age: 28
End: 2023-04-28

## 2023-04-28 ENCOUNTER — PATIENT MESSAGE (OUTPATIENT)
Dept: OBGYN CLINIC | Facility: CLINIC | Age: 28
End: 2023-04-28

## 2023-04-28 VITALS
OXYGEN SATURATION: 100 % | SYSTOLIC BLOOD PRESSURE: 140 MMHG | DIASTOLIC BLOOD PRESSURE: 98 MMHG | TEMPERATURE: 98 F | RESPIRATION RATE: 20 BRPM | HEART RATE: 118 BPM

## 2023-04-28 DIAGNOSIS — K52.9 ACUTE COLITIS: Primary | ICD-10-CM

## 2023-04-28 DIAGNOSIS — R19.7 DIARRHEA, UNSPECIFIED TYPE: ICD-10-CM

## 2023-04-28 LAB
#MXD IC: 1.1 X10ˆ3/UL (ref 0.1–1)
B-HCG UR QL: NEGATIVE
BILIRUB UR QL STRIP: NEGATIVE
BUN BLD-MCNC: 9 MG/DL (ref 7–18)
CHLORIDE BLD-SCNC: 106 MMOL/L (ref 98–112)
CLARITY UR: CLEAR
CO2 BLD-SCNC: 21 MMOL/L (ref 21–32)
COLOR UR: YELLOW
CREAT BLD-MCNC: 0.8 MG/DL
GFR SERPLBLD BASED ON 1.73 SQ M-ARVRAT: 105 ML/MIN/1.73M2 (ref 60–?)
GLUCOSE BLD-MCNC: 89 MG/DL (ref 70–99)
GLUCOSE UR STRIP-MCNC: NEGATIVE MG/DL
HCT VFR BLD AUTO: 43.5 %
HCT VFR BLD CALC: 47 %
HGB BLD-MCNC: 14.4 G/DL
HGB UR QL STRIP: NEGATIVE
ISTAT IONIZED CALCIUM FOR CHEM 8: 1.32 MMOL/L (ref 1.12–1.32)
KETONES UR STRIP-MCNC: NEGATIVE MG/DL
LYMPHOCYTES # BLD AUTO: 4.5 X10ˆ3/UL (ref 1–4)
LYMPHOCYTES NFR BLD AUTO: 31.3 %
MCH RBC QN AUTO: 28.6 PG (ref 26–34)
MCHC RBC AUTO-ENTMCNC: 33.1 G/DL (ref 31–37)
MCV RBC AUTO: 86.5 FL (ref 80–100)
MIXED CELL %: 7.9 %
NEUTROPHILS # BLD AUTO: 8.7 X10ˆ3/UL (ref 1.5–7.7)
NEUTROPHILS NFR BLD AUTO: 60.8 %
NITRITE UR QL STRIP: NEGATIVE
PH UR STRIP: 5 [PH]
PLATELET # BLD AUTO: 398 X10ˆ3/UL (ref 150–450)
POTASSIUM BLD-SCNC: 4 MMOL/L (ref 3.6–5.1)
PROT UR STRIP-MCNC: NEGATIVE MG/DL
RBC # BLD AUTO: 5.03 X10ˆ6/UL
SODIUM BLD-SCNC: 140 MMOL/L (ref 136–145)
SP GR UR STRIP: >=1.03
UROBILINOGEN UR STRIP-ACNC: <2 MG/DL
WBC # BLD AUTO: 14.3 X10ˆ3/UL (ref 4–11)

## 2023-04-28 PROCEDURE — 36415 COLL VENOUS BLD VENIPUNCTURE: CPT

## 2023-04-28 PROCEDURE — 87086 URINE CULTURE/COLONY COUNT: CPT | Performed by: EMERGENCY MEDICINE

## 2023-04-28 PROCEDURE — 99215 OFFICE O/P EST HI 40 MIN: CPT

## 2023-04-28 PROCEDURE — 74177 CT ABD & PELVIS W/CONTRAST: CPT | Performed by: EMERGENCY MEDICINE

## 2023-04-28 PROCEDURE — 96360 HYDRATION IV INFUSION INIT: CPT

## 2023-04-28 PROCEDURE — 99214 OFFICE O/P EST MOD 30 MIN: CPT

## 2023-04-28 PROCEDURE — 96361 HYDRATE IV INFUSION ADD-ON: CPT

## 2023-04-28 PROCEDURE — 81002 URINALYSIS NONAUTO W/O SCOPE: CPT

## 2023-04-28 PROCEDURE — 85025 COMPLETE CBC W/AUTO DIFF WBC: CPT | Performed by: EMERGENCY MEDICINE

## 2023-04-28 PROCEDURE — 81025 URINE PREGNANCY TEST: CPT

## 2023-04-28 PROCEDURE — 80047 BASIC METABLC PNL IONIZED CA: CPT

## 2023-04-28 RX ORDER — SODIUM CHLORIDE 9 MG/ML
1000 INJECTION, SOLUTION INTRAVENOUS ONCE
Status: COMPLETED | OUTPATIENT
Start: 2023-04-28 | End: 2023-04-28

## 2023-04-28 NOTE — ED INITIAL ASSESSMENT (HPI)
Patient arrived ambulatory to room c/o symptoms that started 10 days ago. +right sided lower back pain radiating to the RLQ of the abdomen. Pain intermittent. +intermittent diarrhea. No n/v. No vaginal complaints. No urinary complaints. No fevers. Patient also c/o \"dry tongue\" for the past 2 months.

## 2023-04-29 ENCOUNTER — LAB ENCOUNTER (OUTPATIENT)
Dept: LAB | Age: 28
End: 2023-04-29
Attending: EMERGENCY MEDICINE
Payer: COMMERCIAL

## 2023-04-29 ENCOUNTER — TELEPHONE (OUTPATIENT)
Facility: CLINIC | Age: 28
End: 2023-04-29

## 2023-04-29 NOTE — TELEPHONE ENCOUNTER
Received page from [facility] left sided colitis. Getting infectious stool studies.     GI RN -- pls arrange for urgent clinic f/u

## 2023-05-02 NOTE — TELEPHONE ENCOUNTER
I spoke to  The pt and we scheduled a f/u appt with Lonnie MATTHEWS    Date time and location verified with the pt    Your Appointments    Friday May 05, 2023  9:30 AM  Follow Up Visit with [facility]  [facility] [address] [place] [address],[address], [facility]) [address] [place] [address],[address]  [phone]

## 2023-05-04 DIAGNOSIS — G43.109 MIGRAINE WITH AURA AND WITHOUT STATUS MIGRAINOSUS, NOT INTRACTABLE: ICD-10-CM

## 2023-05-05 ENCOUNTER — OFFICE VISIT (OUTPATIENT)
Facility: CLINIC | Age: 28
End: 2023-05-05

## 2023-05-05 ENCOUNTER — HOSPITAL ENCOUNTER (OUTPATIENT)
Dept: ULTRASOUND IMAGING | Facility: HOSPITAL | Age: 28
Discharge: HOME | End: 2023-05-05
Attending: OBSTETRICS & GYNECOLOGY
Payer: COMMERCIAL

## 2023-05-05 ENCOUNTER — TELEPHONE (OUTPATIENT)
Facility: CLINIC | Age: 28
End: 2023-05-05

## 2023-05-05 VITALS
HEIGHT: 65 IN | WEIGHT: 217 LBS | HEART RATE: 120 BPM | DIASTOLIC BLOOD PRESSURE: 87 MMHG | BODY MASS INDEX: 36.15 KG/M2 | SYSTOLIC BLOOD PRESSURE: 149 MMHG

## 2023-05-05 DIAGNOSIS — K62.5 BRIGHT RED RECTAL BLEEDING: Primary | ICD-10-CM

## 2023-05-05 DIAGNOSIS — R93.5 ABNORMAL CT OF THE ABDOMEN: ICD-10-CM

## 2023-05-05 DIAGNOSIS — R10.9 ABDOMINAL PAIN, UNSPECIFIED ABDOMINAL LOCATION: ICD-10-CM

## 2023-05-05 DIAGNOSIS — R10.84 GENERALIZED ABDOMINAL PAIN: ICD-10-CM

## 2023-05-05 DIAGNOSIS — N91.2 AMENORRHEA: ICD-10-CM

## 2023-05-05 DIAGNOSIS — K62.5 BRBPR (BRIGHT RED BLOOD PER RECTUM): Primary | ICD-10-CM

## 2023-05-05 PROCEDURE — 76830 TRANSVAGINAL US NON-OB: CPT | Performed by: OBSTETRICS & GYNECOLOGY

## 2023-05-05 PROCEDURE — 99204 OFFICE O/P NEW MOD 45 MIN: CPT | Performed by: REGISTERED NURSE

## 2023-05-05 PROCEDURE — 3008F BODY MASS INDEX DOCD: CPT | Performed by: REGISTERED NURSE

## 2023-05-05 PROCEDURE — 3077F SYST BP >= 140 MM HG: CPT | Performed by: REGISTERED NURSE

## 2023-05-05 PROCEDURE — 76856 US EXAM PELVIC COMPLETE: CPT | Performed by: OBSTETRICS & GYNECOLOGY

## 2023-05-05 PROCEDURE — 3079F DIAST BP 80-89 MM HG: CPT | Performed by: REGISTERED NURSE

## 2023-05-05 RX ORDER — POLYETHYLENE GLYCOL 3350, SODIUM CHLORIDE, SODIUM BICARBONATE, POTASSIUM CHLORIDE 420; 11.2; 5.72; 1.48 G/4L; G/4L; G/4L; G/4L
POWDER, FOR SOLUTION ORAL
Qty: 4000 ML | Refills: 0 | Status: SHIPPED | OUTPATIENT
Start: 2023-05-05

## 2023-05-05 RX ORDER — GALCANEZUMAB 120 MG/ML
INJECTION, SOLUTION SUBCUTANEOUS
Qty: 1 ML | Refills: 2 | Status: SHIPPED | OUTPATIENT
Start: 2023-05-05

## 2023-05-05 RX ORDER — HYDROCORTISONE ACETATE 25 MG/1
25 SUPPOSITORY RECTAL 2 TIMES DAILY
Qty: 28 SUPPOSITORY | Refills: 0 | Status: SHIPPED | OUTPATIENT
Start: 2023-05-05 | End: 2023-05-19

## 2023-05-05 NOTE — TELEPHONE ENCOUNTER
Patient contacted. She was ok with booking with Aster Waller before September. Accepted first available at [address] [place] [address] location below.     Your Appointments       Tuesday August 22, 2023 11:30 AM  [facility]  [facility], [address] [place] [address],[address], [facility] ([facility]) [address],[address]  [phone]

## 2023-05-05 NOTE — TELEPHONE ENCOUNTER
Patient was advised to follow up with provider in 8 wks by Guy Douglas. Patient only wants [place]. No provider has appt available in 8 wks. She refused to schedule with a NP. Please advise.

## 2023-05-05 NOTE — TELEPHONE ENCOUNTER
Scheduled for:  Colonoscopy [id number]  Provider Name:  Dr Robert Macias  Date:  06/20/2023  Location:  [facility]  Sedation:  [facility]  Time:  01:00 pm. (pt is aware that [facility] will call the day before to confirm arrival time)  Prep:  Split Trilyte  Meds/Allergies Reconciled?:  Inessa/APN reviewed. Diagnosis with codes:  BRBPR K62.5, Abnormal CT of the abdomen R93.5, Abdominal pain R10.84  Was patient informed to call insurance with codes (Y/N):  Yes   Referral sent?:  Yes  [facility] or [address] notified?:  Electronic case request was sent to [facility] via [employer]. Medication Orders:  Pt is aware to NOT take iron pills, herbal meds and diet supplements for 7 days before exam.   Hold Phentermine for 7 days prior to procedure. Also to NOT take any form of alcohol, recreational drugs and any forms of ED meds 24 hours before exam.   Misc Orders:  N/A   Further instructions given by staff:  I discussed the prep instructions with the patient which she verbally understood. I provided patient with prep instruction's sheet in office.

## 2023-05-05 NOTE — TELEPHONE ENCOUNTER
Newton Campos    Patient was advised to follow up in 8 weeks-I don't see this in note yet. She has a procedure with Dr. Julio Marie for end of June (which is about 8 weeks out). She does not want to see NP only wants to go to [address] [place] [address] or [place]. Do you want her to establish care with Dr. Julio Marie then? [id number] [address] for first available (September) since procedure soon and refusing midlevel?     Thank you

## 2023-05-05 NOTE — PATIENT INSTRUCTIONS
1. Schedule colonoscopy with next available provider with [facility] [Diagnosis: BRBPR, Abnormal CT of abdomen, ABD pain]    2.  bowel prep from pharmacy (split dose Trilyte)    3. Hold phentermine for 7 days prior to procedure. Continue all other medications as normal for your procedure. 4. Read all bowel prep instructions carefully. Bowel prep instructions can also be found online at:  www.[email or web address].org/giprep     5. AVOID seeds, nuts, popcorn, raw fruits and vegetables for 3 days before procedure    6. You MAY need to go for COVID testing 72 hours before procedure. The testing team will call you a few days before your procedure to discuss with you if testing is required. If you are asked to go for COVID testing and do not completed the test, the procedure cannot be performed. 7. If you start any NEW medication after your visit today, please notify us.  Certain medications (like iron or weight loss medications) will need to be held before the procedure, or the procedure cannot be performed safely.      -Complete labs and stool test  -Anusol cream to anus twice per day for 14 days  -Start fiber supplement with fibercon or citrucel  -Low fat diet, increase exercise to 150 mins per week   -If considering probiotic, align  -Consider IBGuard for abdominal pain  -Consider low FODMAP diet  -Schedule with next available provider in 8 weeks to establish care

## 2023-05-09 ENCOUNTER — LAB ENCOUNTER (OUTPATIENT)
Dept: LAB | Age: 28
End: 2023-05-09
Attending: ALLERGY & IMMUNOLOGY
Payer: COMMERCIAL

## 2023-05-09 ENCOUNTER — OFFICE VISIT (OUTPATIENT)
Dept: ALLERGY | Facility: CLINIC | Age: 28
End: 2023-05-09

## 2023-05-09 VITALS — DIASTOLIC BLOOD PRESSURE: 100 MMHG | OXYGEN SATURATION: 95 % | HEART RATE: 103 BPM | SYSTOLIC BLOOD PRESSURE: 150 MMHG

## 2023-05-09 DIAGNOSIS — Z23 FLU VACCINE NEED: ICD-10-CM

## 2023-05-09 DIAGNOSIS — K62.5 BRBPR (BRIGHT RED BLOOD PER RECTUM): ICD-10-CM

## 2023-05-09 DIAGNOSIS — R10.84 GENERALIZED ABDOMINAL PAIN: ICD-10-CM

## 2023-05-09 DIAGNOSIS — H10.10 SEASONAL AND PERENNIAL ALLERGIC RHINOCONJUNCTIVITIS: Primary | ICD-10-CM

## 2023-05-09 DIAGNOSIS — T78.1XXA ADVERSE FOOD REACTION, INITIAL ENCOUNTER: ICD-10-CM

## 2023-05-09 DIAGNOSIS — R93.5 ABNORMAL CT OF THE ABDOMEN: ICD-10-CM

## 2023-05-09 DIAGNOSIS — Z91.018 FOOD ALLERGY: ICD-10-CM

## 2023-05-09 DIAGNOSIS — J30.89 SEASONAL AND PERENNIAL ALLERGIC RHINOCONJUNCTIVITIS: Primary | ICD-10-CM

## 2023-05-09 DIAGNOSIS — J30.2 SEASONAL AND PERENNIAL ALLERGIC RHINOCONJUNCTIVITIS: Primary | ICD-10-CM

## 2023-05-09 DIAGNOSIS — Z92.29 COVID-19 VACCINE SERIES COMPLETED: ICD-10-CM

## 2023-05-09 PROCEDURE — 36415 COLL VENOUS BLD VENIPUNCTURE: CPT | Performed by: ALLERGY & IMMUNOLOGY

## 2023-05-09 PROCEDURE — 86003 ALLG SPEC IGE CRUDE XTRC EA: CPT | Performed by: ALLERGY & IMMUNOLOGY

## 2023-05-09 PROCEDURE — 3077F SYST BP >= 140 MM HG: CPT | Performed by: ALLERGY & IMMUNOLOGY

## 2023-05-09 PROCEDURE — 82785 ASSAY OF IGE: CPT | Performed by: ALLERGY & IMMUNOLOGY

## 2023-05-09 PROCEDURE — 99244 OFF/OP CNSLTJ NEW/EST MOD 40: CPT | Performed by: ALLERGY & IMMUNOLOGY

## 2023-05-09 PROCEDURE — 3080F DIAST BP >= 90 MM HG: CPT | Performed by: ALLERGY & IMMUNOLOGY

## 2023-05-09 RX ORDER — AZELASTINE 1 MG/ML
2 SPRAY, METERED NASAL DAILY
Qty: 3 EACH | Refills: 0 | Status: SHIPPED | OUTPATIENT
Start: 2023-05-09

## 2023-05-09 RX ORDER — METRONIDAZOLE 10 MG/G
1 GEL TOPICAL DAILY
Qty: 60 EACH | Refills: 0 | Status: SHIPPED | OUTPATIENT
Start: 2023-05-09

## 2023-05-09 RX ORDER — LEVOCETIRIZINE DIHYDROCHLORIDE 5 MG/1
5 TABLET, FILM COATED ORAL EVERY EVENING
Qty: 90 TABLET | Refills: 1 | Status: SHIPPED | OUTPATIENT
Start: 2023-05-09

## 2023-05-09 NOTE — PATIENT INSTRUCTIONS
#1 allergic rhinitis  Check serum IgE profile to environmental allergens to evaluate for allergic triggers as patient is currently on antihistamines  Trial of Xyzal, levocetirizine 5 mg once a day as an antihistamine  Astelin 2 sprays per nostril twice a day as an intranasal antihistamine spray  May consider adding Singulair if not improving with above recommendations  Reviewed avoidance measures and potential treatment option immunotherapy    #2 Food allergies  Food allergies versus food intolerances. Reviewed prior serum IgE testing to common food allergens as documented in my note above. All foods by history cause GI issues. No history of hives rashes lip swelling tongue swelling or respiratory issues. Continue to avoid at this time. Recommend skin testing in future to evaluate this visit were positive on serum IgE testing to further evaluate his allergic trigger      #3 IBS  Current GI evaluation underway. Patient has been confused in the near future. Reviewed FODMAP diet. Patient has information on FODMAP ready  Handouts on food allergies versus food intolerances provided and reviewed    #4 COVID vaccines up-to-date  Booster when indicated    Flu vaccine in the fall. #6 facial dermatitis. Suspect rosacea component. Trial of MetroGel once a day. Recommend follow-up with dermatology if not improving with metronidazole  Advised to avoid sunlight spicy foods and alcohol         Orders This Visit:  Orders Placed This Encounter      Allergy [facility] 8      Meds This Visit:  Requested Prescriptions     Signed Prescriptions Disp Refills    levocetirizine 5 MG Oral Tab 90 tablet 1     Sig: Take 1 tablet (5 mg total) by mouth every evening. azelastine 0.1 % Nasal Solution 3 each 0     Si sprays by Nasal route daily. metroNIDAZOLE 1 % External Gel 60 each 0     Sig: Apply 1 Dose topically daily.    Currently indicated

## 2023-05-10 ENCOUNTER — PATIENT MESSAGE (OUTPATIENT)
Facility: CLINIC | Age: 28
End: 2023-05-10

## 2023-05-10 ENCOUNTER — TELEPHONE (OUTPATIENT)
Dept: ALLERGY | Facility: CLINIC | Age: 28
End: 2023-05-10

## 2023-05-10 ENCOUNTER — OFFICE VISIT (OUTPATIENT)
Dept: OBGYN CLINIC | Facility: CLINIC | Age: 28
End: 2023-05-10
Payer: COMMERCIAL

## 2023-05-10 VITALS
WEIGHT: 218.81 LBS | HEIGHT: 65 IN | SYSTOLIC BLOOD PRESSURE: 136 MMHG | DIASTOLIC BLOOD PRESSURE: 74 MMHG | BODY MASS INDEX: 36.46 KG/M2

## 2023-05-10 DIAGNOSIS — E28.2 PCOS (POLYCYSTIC OVARIAN SYNDROME): Primary | ICD-10-CM

## 2023-05-10 LAB
A ALTERNATA IGE QN: <0.1 KUA/L (ref ?–0.1)
A FUMIGATUS IGE QN: <0.1 KUA/L (ref ?–0.1)
AMER SYCAMORE IGE QN: 0.37 KUA/L (ref ?–0.1)
BERMUDA GRASS IGE QN: 0.12 KUA/L (ref ?–0.1)
BOXELDER IGE QN: 5.39 KUA/L (ref ?–0.1)
C HERBARUM IGE QN: <0.1 KUA/L (ref ?–0.1)
CALIF WALNUT IGE QN: 19.1 KUA/L (ref ?–0.1)
CAT DANDER IGE QN: 23.4 KUA/L (ref ?–0.1)
CMN PIGWEED IGE QN: 0.21 KUA/L (ref ?–0.1)
COMMON RAGWEED IGE QN: 39 KUA/L (ref ?–0.1)
CONTROL LINE PRESENT WITH A CLEAR BACKGROUND (YES/NO): YES YES/NO
COTTONWOOD IGE QN: 0.41 KUA/L (ref ?–0.1)
D FARINAE IGE QN: 0.14 KUA/L (ref ?–0.1)
D PTERONYSS IGE QN: 0.2 KUA/L (ref ?–0.1)
DOG DANDER IGE QN: 10.7 KUA/L (ref ?–0.1)
IGE SERPL-ACNC: 536 KU/L (ref 2–214)
M RACEMOSUS IGE QN: <0.1 KUA/L (ref ?–0.1)
MARSH ELDER IGE QN: 1.2 KUA/L (ref ?–0.1)
MOUSE EPITH IGE QN: 0.39 KUA/L (ref ?–0.1)
MT JUNIPER IGE QN: 0.75 KUA/L (ref ?–0.1)
P NOTATUM IGE QN: <0.1 KUA/L (ref ?–0.1)
PECAN/HICK TREE IGE QN: 15.3 KUA/L (ref ?–0.1)
PREGNANCY TEST, URINE: NEGATIVE
ROACH IGE QN: <0.1 KUA/L (ref ?–0.1)
SALTWORT IGE QN: 0.86 KUA/L (ref ?–0.1)
TIMOTHY IGE QN: <0.1 KUA/L (ref ?–0.1)
WHITE ASH IGE QN: 3.01 KUA/L (ref ?–0.1)
WHITE ELM IGE QN: 3.52 KUA/L (ref ?–0.1)
WHITE MULBERRY IGE QN: <0.1 KUA/L (ref ?–0.1)
WHITE OAK IGE QN: 39.2 KUA/L (ref ?–0.1)

## 2023-05-10 PROCEDURE — 3078F DIAST BP <80 MM HG: CPT | Performed by: OBSTETRICS & GYNECOLOGY

## 2023-05-10 PROCEDURE — 99213 OFFICE O/P EST LOW 20 MIN: CPT | Performed by: OBSTETRICS & GYNECOLOGY

## 2023-05-10 PROCEDURE — 81025 URINE PREGNANCY TEST: CPT | Performed by: OBSTETRICS & GYNECOLOGY

## 2023-05-10 PROCEDURE — 3075F SYST BP GE 130 - 139MM HG: CPT | Performed by: OBSTETRICS & GYNECOLOGY

## 2023-05-10 PROCEDURE — 3008F BODY MASS INDEX DOCD: CPT | Performed by: OBSTETRICS & GYNECOLOGY

## 2023-05-10 NOTE — TELEPHONE ENCOUNTER
Pt returned call. She confirmed her name and . RN went over all results listed below. Pt verbalized understanding and denies any further questions or concerns. She will notify us if interested in starting allergy shots.

## 2023-05-10 NOTE — TELEPHONE ENCOUNTER
----- Message from Ana Luisa Caraballo MD sent at 5/10/2023  2:50 PM CDT -----   Please call patient with recent serum IgE testing to common environmental allergens.   Patient showed IgE production to ragweed and cat dog and trees more so than grass weeds dust mite

## 2023-05-10 NOTE — PATIENT INSTRUCTIONS
Patient education: Polycystic ovary syndrome (PCOS) (Beyond the Basics)  AUTHORS:  MD Olimpia Richardson MD  SECTION EDITORS:  MD Dallas Guidry MD  DEPUTY :  Marianna Mustafa MD    All topics are updated as new evidence becomes available and our peer review process is complete. Literature review current through: Apr 2023. This topic last updated: Dec 20, 2021. Please read the Disclaimer at the end of this page. PCOS OVERVIEW  Polycystic ovary syndrome (PCOS) is a condition that causes irregular menstrual periods because monthly ovulation is not occurring and levels of androgens (male hormones) are elevated. The condition occurs in approximately 5 to 10 percent of women. The elevated androgen levels can sometimes cause excessive facial hair growth, acne, and/or male-pattern scalp hair thinning. Most, but not all, women with PCOS are overweight or obese, and they are at higher-than-average risk of developing diabetes and obstructive sleep apnea. For women with PCOS who want to become pregnant, fertility medications may be needed to trigger ovulation. Although PCOS is not completely reversible, there are a number of treatments that can reduce or minimize bothersome symptoms. Most women with PCOS are able to lead a normal life without significant complications. PCOS CAUSES   Reproductive system abnormalities -- The cause of PCOS is not completely understood. With regards to the reproductive system, it is believed that abnormal levels of the pituitary hormone luteinizing hormone (LH) and high levels of male hormones (androgens) interfere with normal function of the ovaries. To explain how these hormones cause symptoms, it is helpful to understand the normal menstrual cycle.   Normal menstrual cycle -- The brain (including the pituitary gland), ovaries, and uterus normally follow a sequence of events once per month; this sequence helps to prepare the body for pregnancy. Two hormones, follicle-stimulating hormone (FSH) and LH, are made by the pituitary gland. Two other hormones, progesterone and estrogen, are made by the ovaries. During the first half of the cycle, small increases in [facility] stimulate the ovary to develop a follicle that contains an egg (oocyte). The follicle produces rising levels of estrogen, which cause the lining of the uterus to thicken and the pituitary to release a very large amount of LH. This midcycle \"surge\" of LH causes the egg to be released from the ovary (called ovulation) approximately 36 to 48 hours following the [facility] surge (figure 1). If the egg is fertilized by a sperm, it develops into an embryo, which travels through the fallopian tube to the uterus. After ovulation, the ovary produces both estrogen and progesterone, which prepare the uterus for possible embryo implantation and pregnancy. Menstrual cycle in PCOS -- In women with PCOS, multiple small follicles (small cysts 4 to 9 mm in diameter) accumulate in the ovary. None of these small follicles/cysts are capable of growing to a size that would trigger ovulation. As a result, the levels of estrogen, progesterone, LH, and FSH become imbalanced. Androgens are normally produced by the ovaries and the adrenal glands. Examples of androgens include testosterone, androstenedione, dehydroepiandrosterone (DHEA), and DHEA sulfate (DHEAS). Androgens may become increased in women with PCOS because of the high levels of LH but also because of increased levels of insulin that are usually seen with PCOS. (See 'Insulin abnormalities' below.)  Metabolic system abnormalities -- The metabolic system controls the processing of carbohydrates, fats, and proteins. Important hormones in the metabolic system include insulin, glucagon, glucagon-like peptides, and many others. Insulin abnormalities -- PCOS is associated with elevated levels of insulin in the blood.  Insulin is a hormone that is produced by specialized cells within the pancreas; insulin regulates blood glucose levels. When blood glucose levels rise (after eating, for example), these cells produce insulin to help the body use glucose for energy. ?If glucose levels do not respond to normal levels of insulin, the pancreas produces more insulin. Excess production of insulin is called hyperinsulinemia. ? When increased levels of insulin are required to maintain normal glucose levels, a person is said to be insulin resistant. ? When the blood glucose levels are not completely controlled, even with increased amounts of insulin, the person is said to have glucose intolerance (sometimes referred to as \"prediabetes\"). ?If blood glucose levels continue to rise despite increased insulin levels, the person may have impaired glucose tolerance, often referred to as \"prediabetes,\" which can ultimately lead to development of type 2 diabetes. These conditions are diagnosed with blood tests. (See \"Patient education: Type 2 diabetes: Overview (Beyond the Basics)\". )  Insulin resistance and hyperinsulinemia can occur in both normal-weight and overweight women with PCOS. Among women with PCOS who are obese, there appears to be a three-fold increase in risk for prediabetes when compared with women without PCOS who are obese; up to 35 percent of those who are obese develop impaired glucose tolerance (\"prediabetes\") by age 36 years, while up to 10 percent of obese women develop type 2 diabetes. A family history of diabetes, overweight and obesity, as well as race and ethnicity (particularly  and ), can increase the likelihood of developing diabetes among women with PCOS. PCOS SYMPTOMS  The changes in hormone levels described above cause the classic symptoms of PCOS, including absent or irregular and infrequent menstrual periods, increased body hair growth or scalp hair loss, acne, and difficulty becoming pregnant.  (See \"Patient education: Androgenetic alopecia in men and women (Beyond the Basics)\". )  Signs and symptoms of PCOS usually begin around the time of puberty, although some women do not develop symptoms until late adolescence or even into early adulthood. Because hormonal changes vary from one woman to another, patients with PCOS may have mild to severe acne, facial hair growth, or scalp hair loss. Menstrual irregularity -- If ovulation does not occur, the lining of the uterus (called the endometrium) does not uniformly shed and regrow as in a normal menstrual cycle. Instead, the endometrium becomes thicker and may shed irregularly, which can result in heavy and/or prolonged bleeding. Irregular or absent menstrual periods can increase a woman's risk of endometrial overgrowth (called endometrial hyperplasia) or even endometrial cancer. Women with PCOS usually have fewer than six to eight menstrual periods per year. Some women have normal cycles during puberty, which may become irregular if the woman becomes overweight. Weight gain and obesity -- PCOS is associated with gradual weight gain and obesity in approximately one-half of women. For some women with PCOS, obesity develops at the time of puberty. Hair growth and acne -- Male-pattern hair growth (hirsutism) may be seen on the upper lip, chin, neck, sideburn area, chest, upper or lower abdomen, upper arm, and inner thigh. Acne is a skin condition that causes oily skin and blockages in hair follicles. (See \"Patient education: Hirsutism (excess hair growth in females) (Beyond the Basics)\" and \"Patient education: Acne (Beyond the Basics)\". )  Infertility -- Many women with PCOS do not ovulate regularly, and it may take these women longer to become pregnant. If a woman with PCOS has irregular periods, their fertility evaluation should start immediately as the chance of becoming pregnant is low without treatment.  (See 'Treatment of infertility' below.)  Heart disease -- Women who are obese and who also have insulin resistance or diabetes might have an increased risk of coronary artery disease, which increases the risk of having a heart attack. It is not known with certainty if women with PCOS are at increased risk for this condition. Both weight loss and treatment of insulin abnormalities can decrease this risk. Other treatments (eg, cholesterol-lowering medications [statins], and treatments for high blood pressure) may also be recommended. (See \"Patient education: High cholesterol and lipids (Beyond the Basics)\" and \"Patient education: High blood pressure treatment in adults (Beyond the Basics)\". )  Sleep apnea -- Sleep apnea is a condition that causes brief spells where breathing stops (apnea) during sleep. Patients with this problem often experience fatigue and daytime sleepiness. In addition, there is evidence that people with untreated sleep apnea have an increased risk of insulin resistance, obesity, diabetes, and cardiovascular problems, such as high blood pressure, heart attack, abnormal heart rhythms, or stroke. Sleep apnea may occur in up to 48 percent of women with PCOS. The condition can be diagnosed with a sleep study, and several treatments are available. (See \"Patient education: Sleep apnea in adults (Beyond the Basics)\". )  Other problems -- Women with PCOS are at increased risk of other problems that can impact quality of life. These include:  ?Depression and anxiety - There are treatments that can help with these problems, including therapy as well as medications. (See \"Patient education: Depression in adults (Beyond the Basics)\". )  ? Sexual dysfunction - Women with PCOS are more likely than other women to experience lower sexual satisfaction. (See \"Patient education: Sexual problems in females (Beyond the Basics)\". )  ? Eating disorders - These include bulimia and binge eating. Women with PCOS do not appear to be at increased risk of developing anorexia.   If you think you might be experiencing any of these problems, talk with your health care provider. There are often treatments that can help. Symptoms after menopause -- Less is known about PCOS symptoms after menopause. Research suggests that women with PCOS may continue to have high androgen levels after menopause (when monthly periods normally stop), but that they decline to normal after approximately age 79. However, even women who have been through menopause and whose hormone levels are returning to normal can have symptoms like excess hair growth. (See 'Hair growth and acne' above.)  PCOS DIAGNOSIS  There is no single test for diagnosing PCOS. You may be diagnosed with PCOS based upon your symptoms, blood tests, and a physical examination. Expert groups have determined that a woman must have two out of three of the following to be diagnosed with PCOS:  ?Irregular menstrual periods caused by anovulation or irregular ovulation. ? Evidence of elevated androgen levels. The evidence can be based upon signs (excess hair growth, acne, or male-pattern balding) or blood tests (high androgen levels). ?Polycystic ovaries on pelvic ultrasound. In addition, there must be no other cause of elevated androgen levels or irregular periods (eg, congenital adrenal hyperplasia [classic or nonclassic], androgen-secreting tumors, or hyperprolactinemia). Blood tests are usually recommended to determine whether another condition is the cause of your signs and/or symptoms. If you have irregular periods, blood tests for pregnancy, prolactin level, thyroid-stimulating hormone (TSH), and follicle-stimulating hormone [facility]) should be done. Insulin levels are not used to diagnose PCOS, partly because insulin levels are high in people who are above normal body weight and because there is no level of insulin that is \"diagnostic\" for PCOS. If PCOS is confirmed, blood glucose and cholesterol testing are usually performed.  An oral glucose tolerance test is the best way to diagnose prediabetes and/or diabetes. A fasting glucose level is often normal even when prediabetes or diabetes is present. Many clinicians who treat PCOS patients also recommend testing for sleep apnea with questionnaires or overnight sleep studies in a sleep laboratory. In women with moderate to severe hirsutism (excess hair growth), blood tests for testosterone and dehydroepiandrosterone sulfate (DHEAS) may be recommended. All women who are diagnosed with PCOS should be seen on a routine basis by a health care provider for the metabolic and reproductive issues that may occur. In addition, depression and anxiety are common in women with PCOS. PCOS TREATMENTS   Oral contraceptives -- Oral contraceptives (OCs; with combined estrogen and progestin) are the most commonly used treatment for regulating menstrual periods in women with PCOS. OCs protect the woman from endometrial (uterine) hyperplasia or cancer by inducing a monthly menstrual period. OCs are also effective for treating hirsutism and acne by suppressing ovarian androgen production. A skin patch and vaginal ring are also available for contraception. Some women choose intrauterine devices (IUDs) containing a type of progesterone to minimize uterine bleeding and protect against uterine cancer. However, unlike OCs, patch, and ring, the IUD is not effective for treating acne or facial hair. Women with PCOS occasionally ovulate, and OCs are useful in providing protection from pregnancy. Although an OC allows for bleeding once per month, this does not mean that the PCOS is \"cured\"; irregular cycles generally return when the OC is stopped. (See \"Patient education: Absent or irregular periods (Beyond the Basics)\". )  OCs decrease the body's production of androgens, and antiandrogen drugs (such as spironolactone) decrease the effect of androgens. These treatments can be used in combination to reduce and slow hair growth.  OCs and antiandrogens can also reduce acne. Other prescription skin treatments (eg, medicated lotions) or oral antibiotics may be recommended in some cases. (See \"Patient education: Acne (Beyond the Basics)\". )  Professional societies do not require an examination or blood test to start an OC. Women without regular menses may need a test for pregnancy prior to starting OCs. This is no longer being done as frequently as in the past.  Side effects -- Some women who take birth control pills (not just those with PCOS) stop having monthly bleeding or develop irregular spotting and bleeding. Irregular bleeding usually resolves after a few menstrual cycles. Many women worry that they will gain weight on the pill. In general, this is not a concern with the currently available low-dose pills. Some women develop nausea, breast tenderness, and bloating after beginning the pill, but these symptoms usually resolve after two or three months. The pill is safe and effective, although it slightly increases the risk of blood clots in the legs or lungs; this is a rare complication in young, healthy women who do not smoke, but it is more of a concern in women who are obese and in older women. (See \"Patient education: Hormonal methods of birth control (Beyond the Basics)\". )  Progestin -- Another method to treat menstrual irregularity is to take a hormone called progestin (sample brand name: Provera) for 10 to 14 days every 1 to 3 months. This will induce a period in almost all women with PCOS, but it does not help with the cosmetic concerns (hirsutism and acne) and does not prevent pregnancy. It does reduce the risk of uterine cancer. Hair treatments -- Excess hair growth on the face and/or other parts of the body can be removed by shaving or use of depilatories, electrolysis, or laser therapy. Many women worry that these treatments cause hair to grow faster, although this is not true.  (See \"Patient education: Hirsutism (excess hair growth in females) (Beyond the Basics)\". )  In women with PCOS, hormonal treatment of excess hair growth is typically approached in a two-step process. The first step is to prescribe an estrogen-progestin contraceptive (ie, a birth control pill). If, after six months of hormone treatment, sufficient improvement in excess hair growth has not been achieved, a second medication called spironolactone, an antiandrogen, is added. If hormone treatment with an estrogen-progestin results in a satisfactory reduction in excess hair growth, this therapy is continued. Scalp hair loss can be treated with medications in some situations. Other options include hair replacement and wigs. (See \"Patient education: Androgenetic alopecia in men and women (Beyond the Basics)\". )  Weight loss -- For women with PCOS who are overweight or obese, weight loss is one of the most effective approaches for managing insulin abnormalities, irregular menstrual periods, and other symptoms of PCOS. For example, many overweight women with PCOS who lose 5 to 10 percent of their body weight notice that their periods become more regular. Weight loss can often be achieved with a program of diet and exercise. There are a number of options available to treat obesity. These options are identical to those recommended for women without PCOS and include diet and exercise, weight loss medications (although their use is limited), and weight loss surgery. (See \"Patient education: Losing weight (Beyond the Basics)\". )  Weight loss surgery may be an option for severely obese women with PCOS. Women can lose significant amounts of weight after surgery, which can restore normal menstrual cycles, reduce high androgen levels and hirsutism, and reduce the risk of type 2 diabetes. (See \"Patient education: Weight loss surgery and procedures (Beyond the Basics)\". )  Metformin -- Metformin (sample brand name: Glucophage) is a medication that improves the effectiveness of insulin produced by the body.  It was developed as a treatment for type 2 diabetes but may be recommended for women with PCOS in selected situations. ?If a woman does not have regular menstrual cycles, the first-line treatment is a hormonal method of birth control, such as birth control pills. If the woman cannot take birth control pills, one alternative is to take metformin; a progestin is usually recommended, in addition to metformin, for six months or until menstrual cycles are regular. (See 'Progestin' above.)  ? Metformin may help a bit with weight loss. Although metformin is not a weight-loss drug, some studies have shown that women with PCOS who are on a low-calorie diet lose slightly more weight when metformin is added. If metformin is used, it is essential that diet and exercise are also part of the recommended regimen because the weight that is lost in the early phase of metformin treatment may be regained over time. Metformin is not usually recommended for women with PCOS who have difficulty becoming pregnant, because it is not as effective as other treatments for ovulation induction, letrozole, and clomiphene. (See 'Treatment of infertility' below.)  An expert group does not recommend metformin for women with PCOS in whom excessive hair growth (hirsutism) is of primary concern. Birth control pills alone, or in combination with an antiandrogen medication, are a better option. (See \"Patient education: Hirsutism (excess hair growth in females) (Beyond the Basics)\". )  Treatment of infertility -- If tests determine that lack of ovulation is the cause of infertility, several treatment options are available. These treatments work best in women who are not obese. The primary treatment for women who are unable to become pregnant and who have PCOS is weight loss. Even a modest amount of weight loss may allow the woman to begin ovulating normally. In addition, weight loss can improve the effectiveness of other infertility treatments.  (See \"Patient education: Evaluation of infertility in couples (Beyond the Basics)\". )  Clomiphene is a [place] Food and Drug Administration (FDA)-approved oral medication that stimulates the ovaries to release one or more eggs. It triggers ovulation in approximately [de-identified] percent of women with PCOS, and approximately 50 percent of these women will become pregnant. (See \"Patient education: Ovulation induction with clomiphene (Beyond the Basics)\". )  Letrozole is a medication that is FDA approved for the treatment of breast cancer, but it is not approved for induction of ovulation. However, studies have shown that live birth rates are higher in obese women with PCOS when they are treated with letrozole rather than clomiphene. Many experts now recommend letrozole as the first choice of treatment for women with PCOS who want to conceive. A few studies have shown that taking metformin in addition to clomiphene increases the rate of ovulation; other studies have shown no additional benefit of adding metformin to clomiphene treatment. Women who take metformin before pregnancy are usually advised to stop it once they become pregnant. If a woman does not ovulate or is unable to conceive with letrozole, clomiphene, gonadotropin therapy (follicle-stimulating hormone [FSH] injections) is sometimes recommended. However, this treatment can cause multiple pregnancies, including triplets and quadruplets, particularly in patients with PCOS. In modern practice, IVF will often be suggested rather than FSH injections if clomiphene and letrozole do not result in a pregnancy. The risk of multiple gestations is lower with IVF and single embryo transfer when compared to [facility] injections. (See \"Patient education: Infertility treatment with gonadotropins (Beyond the Basics)\".)  WHERE TO GET MORE INFORMATION  Your health care provider is the best source of information for questions and concerns related to your medical problem.   This article will be updated as needed on our website [email or web address]). Related topics for patients, as well as selected articles written for health care professionals, are also available. Some of the most relevant are listed below. Patient level information -- [employer] offers two types of patient education materials. The Basics -- The Basics patient education pieces answer the four or five key questions a patient might have about a given condition. These articles are best for patients who want a general overview and who prefer short, easy-to-read materials. Patient education: Polycystic ovary syndrome (The Basics)  Patient education: Hirsutism (excess hair growth in women) (The Basics)  Patient education: Ovarian cysts (The Basics)  Patient education: Absent or irregular periods (The Basics)  Beyond the Basics -- Beyond the Basics patient education pieces are longer, more sophisticated, and more detailed. These articles are best for patients who want in-depth information and are comfortable with some medical jargon.   Patient education: Androgenetic alopecia in men and women (Beyond the Basics)  Patient education: Hirsutism (excess hair growth in females) (Beyond the Basics)  Patient education: Acne (Beyond the Basics)  Patient education: Type 2 diabetes: Overview (Beyond the Basics)  Patient education: High cholesterol and lipids (Beyond the Basics)  Patient education: High blood pressure treatment in adults (Beyond the Basics)  Patient education: Sleep apnea in adults (Beyond the Basics)  Patient education: Absent or irregular periods (Beyond the Basics)  Patient education: Hormonal methods of birth control (Beyond the Basics)  Patient education: Losing weight (Beyond the Basics)  Patient education: Weight loss surgery and procedures (Beyond the Basics)  Patient education: Evaluation of infertility in couples (Beyond the Basics)  Patient education: Ovulation induction with clomiphene (Beyond the Basics)  Patient education: Infertility treatment with gonadotropins (Beyond the Basics)   Professional level information -- Professional level articles are designed to keep doctors and other health professionals up-to-date on the latest medical findings. These articles are thorough, long, and complex, and they contain multiple references to the research on which they are based. Professional level articles are best for people who are comfortable with a lot of medical terminology and who want to read the same materials their doctors are reading. Definition, clinical features, and differential diagnosis of polycystic ovary syndrome in adolescents  Clinical manifestations of polycystic ovary syndrome in adults  Etiology and pathophysiology of polycystic ovary syndrome in adolescents  Diagnosis of polycystic ovary syndrome in adults  Epidemiology, phenotype, and genetics of the polycystic ovary syndrome in adults  Metformin for treatment of the polycystic ovary syndrome  Management of hirsutism in premenopausal women  Treatment of polycystic ovary syndrome in adolescents  Treatment of polycystic ovary syndrome in adults   The following organizations also provide reliable health information. ? [employer]  (www.nlm.nih.gov/medlineplus/healthtopics. html)  ? [facility] [facility]  (https://www.[facility]/. org/diseases-and-conditions/polycystic-ovary-syndrome)  ? [employer]  ([employer])  Use of UpToDate is subject to the Terms of Use. This generalized information is a limited summary of diagnosis, treatment, and/or medication information. It is not meant to be comprehensive and should be used as a tool to help the user understand and/or assess potential diagnostic and treatment options. It does NOT include all information about conditions, treatments, medications, side effects, or risks that may apply to a specific patient.  It is not intended to be medical advice or a substitute for the medical advice, diagnosis, or treatment of a health care provider based on the health care provider's examination and assessment of a patient's specific and unique circumstances. Patients must speak with a health care provider for complete information about their health, medical questions, and treatment options, including any risks or benefits regarding use of medications. This information does not endorse any treatments or medications as safe, effective, or approved for treating a specific patient. UpToDate, Inc. and its affiliates disclaim any warranty or liability relating to this information or the use thereof. The use of this information is governed by the Terms of Use, available at [employer]uy. com/en/know/clinical-effectiveness-terms [address] and its affiliates and/or [employer] . All rights reserved. Topic 2163 Version 20.0  GRAPHICS  Menstrual cycle    Graphic 30356 Version 2.0  Contributor Disclosures  Brown Herring relevant financial relationship(s) with ineligible companies to disclose. Kasie Contreras/Research/Clinical Trial Support: [employer][Diabetes]. All of the relevant financial relationships listed have been mitigated. Kasie Briscoe/Research/Clinical Trial Support: [employer] [Hypogonadism]; [employer] [Acromegaly]; [employer] [Cushing's]; [employer] [Cushing's]. Consultant/Advisory Boards: [employer] [Hypogonadism]; Novartis [Cushing's]; [employer] [Acromegaly]; [address] [place] [address] [Cushing's]. All of the relevant financial relationships listed have been mitigated. Ayden Anders [facility] Ownership/Stock Options: Son Vazquez [Endocrinology]. Consultant/Advisory Boards: [employer] [Endocrinology]. All of the relevant financial relationships listed have been mitigated. Brown Cancino relevant financial relationship(s) with ineligible companies to disclose.   Contributor disclosures are reviewed for conflicts of interest by the editorial group. When found, these are addressed by vetting through a multi-level review process, and through requirements for references to be provided to support the content. Appropriately referenced content is required of all authors and must conform to UpToDate standards of evidence.   Conflict of interest policy

## 2023-05-11 ENCOUNTER — OFFICE VISIT (OUTPATIENT)
Dept: DERMATOLOGY CLINIC | Facility: CLINIC | Age: 28
End: 2023-05-11

## 2023-05-11 DIAGNOSIS — L71.9 ROSACEA: ICD-10-CM

## 2023-05-11 DIAGNOSIS — L91.8 INFLAMED ACROCHORDON: ICD-10-CM

## 2023-05-11 DIAGNOSIS — L70.0 ACNE VULGARIS: Primary | ICD-10-CM

## 2023-05-11 PROCEDURE — 11200 RMVL SKIN TAGS UP TO&INC 15: CPT | Performed by: STUDENT IN AN ORGANIZED HEALTH CARE EDUCATION/TRAINING PROGRAM

## 2023-05-11 PROCEDURE — 99204 OFFICE O/P NEW MOD 45 MIN: CPT | Performed by: STUDENT IN AN ORGANIZED HEALTH CARE EDUCATION/TRAINING PROGRAM

## 2023-05-11 NOTE — TELEPHONE ENCOUNTER
From: Endy Zapata  To: ISAIAH Burnett  Sent: 5/10/2023 8:18 PM CDT  Subject: Stool sample    Hi,  I know you wanted me to go get another stool sample done but how do I complete this one? I was not given anything at my appointment or from my pharmacy. Iftikhar Jean its not something I come into the lab to do, is it? I go to [facility] frequently for work so if I need to pick something up, I can. Please let me know!    Thanks,   Stephanie Waters

## 2023-06-01 ENCOUNTER — PATIENT MESSAGE (OUTPATIENT)
Facility: CLINIC | Age: 28
End: 2023-06-01

## 2023-06-01 NOTE — TELEPHONE ENCOUNTER
I spoke with Latesha Cade, name/ verified. She said she will not cancel her scheduled colonoscopy on 23. I informed her that [facility] will call her on  for the arrival/procedure time. She is at [facility] when we spoke and I instructed her to go to the lab in hospital or [facility] to  stool kit for calprotectin. She verbalized understanding, no further concerns, issues at this time. TE closed.

## 2023-06-03 DIAGNOSIS — F41.9 ANXIETY: ICD-10-CM

## 2023-06-08 ENCOUNTER — PATIENT MESSAGE (OUTPATIENT)
Dept: NEUROLOGY | Facility: CLINIC | Age: 28
End: 2023-06-08

## 2023-06-08 DIAGNOSIS — G43.109 MIGRAINE WITH AURA AND WITHOUT STATUS MIGRAINOSUS, NOT INTRACTABLE: ICD-10-CM

## 2023-06-08 RX ORDER — TOPIRAMATE 50 MG/1
TABLET, FILM COATED ORAL
Qty: 120 TABLET | Refills: 5 | OUTPATIENT
Start: 2023-06-08

## 2023-06-09 ENCOUNTER — TELEPHONE (OUTPATIENT)
Dept: NEUROLOGY | Facility: CLINIC | Age: 28
End: 2023-06-09

## 2023-06-09 RX ORDER — TOPIRAMATE 50 MG/1
TABLET, FILM COATED ORAL
Qty: 360 TABLET | Refills: 0 | Status: SHIPPED | OUTPATIENT
Start: 2023-06-09

## 2023-06-12 ENCOUNTER — TELEPHONE (OUTPATIENT)
Facility: CLINIC | Age: 28
End: 2023-06-12

## 2023-06-12 DIAGNOSIS — R93.5 ABNORMAL CT OF THE ABDOMEN: ICD-10-CM

## 2023-06-12 DIAGNOSIS — K62.5 BRBPR (BRIGHT RED BLOOD PER RECTUM): Primary | ICD-10-CM

## 2023-06-12 DIAGNOSIS — R10.84 ABDOMINAL PAIN, GENERALIZED: ICD-10-CM

## 2023-06-12 NOTE — TELEPHONE ENCOUNTER
Patient is calling needs to r/s states mothers  services are same day of procedure. She would like to change.  Please call

## 2023-07-14 ENCOUNTER — OFFICE VISIT (OUTPATIENT)
Dept: OBGYN CLINIC | Facility: CLINIC | Age: 28
End: 2023-07-14
Payer: COMMERCIAL

## 2023-07-14 VITALS
DIASTOLIC BLOOD PRESSURE: 78 MMHG | BODY MASS INDEX: 35.99 KG/M2 | HEIGHT: 65 IN | WEIGHT: 216 LBS | SYSTOLIC BLOOD PRESSURE: 132 MMHG

## 2023-07-14 DIAGNOSIS — E28.2 PCOS (POLYCYSTIC OVARIAN SYNDROME): ICD-10-CM

## 2023-07-14 DIAGNOSIS — Z30.430 ENCOUNTER FOR IUD INSERTION: Primary | ICD-10-CM

## 2023-07-14 LAB
CONTROL LINE PRESENT WITH A CLEAR BACKGROUND (YES/NO): YES YES/NO
PREGNANCY TEST, URINE: NEGATIVE

## 2023-07-14 PROCEDURE — 81025 URINE PREGNANCY TEST: CPT | Performed by: OBSTETRICS & GYNECOLOGY

## 2023-07-14 PROCEDURE — 3008F BODY MASS INDEX DOCD: CPT | Performed by: OBSTETRICS & GYNECOLOGY

## 2023-07-14 PROCEDURE — 3078F DIAST BP <80 MM HG: CPT | Performed by: OBSTETRICS & GYNECOLOGY

## 2023-07-14 PROCEDURE — 3075F SYST BP GE 130 - 139MM HG: CPT | Performed by: OBSTETRICS & GYNECOLOGY

## 2023-07-14 PROCEDURE — 58300 INSERT INTRAUTERINE DEVICE: CPT | Performed by: OBSTETRICS & GYNECOLOGY

## 2023-07-14 NOTE — PROCEDURES
IUD INSERTION PROCEDURE NOTE  [place] [address] [facility]    Larry Brown is a 24year old female. Pt is here for IUD placement. Maria Elena Stauffer  PAP: nilm04/11/2023  STI: none  Pregnancy test: not currently sexually active, negative   Prior contraception: not currently active, Depo Provera    The patient was informed regarding indication for procedure, technique, side effects and alternatives. The risks of proceudre including bleeding and infection as well as an approximately 1/1000 risk of uterine perforation with assiciated need for surgical removal of the device. Reviewed small risk of expulsion. Reviewed a <0.5% rsk of pregnancy failure with intrauterine contraception and risk for ectopic pregnancy in this case. Informed consent was obtained. Time out performed. EXAM:  : normal external vagina; speculum exam reveals normal vaginal walls and normal cervix    PROCEDURE:  The patient was prepped and draped. Tenaculum was placed on the cervix. The uterus sounded to 7 cm. The IUD was inserted. The IUD applicator was removed without difficulty. The IUD strings were shortened to 3 cm. Good hemostasis at the tenaculum site and the cervical os was noted. Aftercare instructions were provided to the patient. The patient indicates understanding of these issues and agrees to the plan. The patient is asked to return in 3-4  for IUD check.

## 2023-07-15 DIAGNOSIS — G43.109 MIGRAINE WITH AURA AND WITHOUT STATUS MIGRAINOSUS, NOT INTRACTABLE: ICD-10-CM

## 2023-07-17 ENCOUNTER — APPOINTMENT (OUTPATIENT)
Dept: LAB | Facility: HOSPITAL | Age: 28
End: 2023-07-17
Attending: REGISTERED NURSE
Payer: COMMERCIAL

## 2023-07-17 PROCEDURE — 83993 ASSAY FOR CALPROTECTIN FECAL: CPT

## 2023-07-17 RX ORDER — SUMATRIPTAN 100 MG/1
TABLET, FILM COATED ORAL
Qty: 9 TABLET | Refills: 0 | Status: SHIPPED | OUTPATIENT
Start: 2023-07-17

## 2023-07-18 ENCOUNTER — LAB ENCOUNTER (OUTPATIENT)
Dept: LAB | Facility: HOSPITAL | Age: 28
End: 2023-07-18
Attending: REGISTERED NURSE
Payer: COMMERCIAL

## 2023-07-18 DIAGNOSIS — R10.84 GENERALIZED ABDOMINAL PAIN: ICD-10-CM

## 2023-07-18 DIAGNOSIS — K62.5 BRBPR (BRIGHT RED BLOOD PER RECTUM): ICD-10-CM

## 2023-07-18 DIAGNOSIS — R93.5 ABNORMAL CT OF THE ABDOMEN: ICD-10-CM

## 2023-07-20 LAB — CALPROTECTIN STL-MCNT: 18.4 ΜG/G (ref ?–50)

## 2023-07-22 DIAGNOSIS — G43.109 MIGRAINE WITH AURA AND WITHOUT STATUS MIGRAINOSUS, NOT INTRACTABLE: ICD-10-CM

## 2023-07-23 ENCOUNTER — PATIENT MESSAGE (OUTPATIENT)
Dept: OBGYN CLINIC | Facility: CLINIC | Age: 28
End: 2023-07-23

## 2023-07-23 DIAGNOSIS — Z97.5 IUD (INTRAUTERINE DEVICE) IN PLACE: Primary | ICD-10-CM

## 2023-07-24 RX ORDER — GALCANEZUMAB 120 MG/ML
120 INJECTION, SOLUTION SUBCUTANEOUS
Qty: 1 ML | Refills: 2 | Status: SHIPPED | OUTPATIENT
Start: 2023-07-24

## 2023-07-24 NOTE — TELEPHONE ENCOUNTER
From: Minh Jansen  Sent: 7/24/2023 9:36 AM CDT  To: [facility] Clinical Staff  Subject: IUD     Hi,   Yes- I took midol. I think I will try Advil today. I still have cramps and spotting. No migraine. I do get migraines and I use Emgality injection medication as well as Sumatriptan.

## 2023-07-24 NOTE — TELEPHONE ENCOUNTER
Medication: EMGALITY 120 MG/ML Subcutaneous Solution Auto-injector      Date of last refill: 2023 (#1 mL/2)  [place]     Last office visit: 2023  Due back to clinic per last office note:  Around 2023  Date next office visit scheduled:    Future Appointments   Date Time Provider Antoinette Oakley   2023  8:15 AM Anna Beebe DO [facility]   8/10/2023  7:30 AM Alia Schroeder MD [facility]   8/10/2023 10:15 AM Rajan Mahoney MD [address]   2023 11:30 AM Joleen Perdomo [facility]   2023  4:30 PM Kyle Hagen MD [facility]           Last OV note recommendation:    mpression/Plan:  Migraine with aura and without status migrainosus, not intractable  (primary encounter diagnosis)  Chronic neck pain  Anxiety  Tmj (temporomandibular joint syndrome)        Refractory migraines- main triggers of chronic neck and TMJ- not improved with mouth guard, as well as what sounds like allergies     Recommended again to start PT  Speak to PCP about additional allergy medication     Abortive take sumatriptan 100mg and 2 alleve, immediately at onset     Refractory to Elavil and topamax  Start PA for Emgality  Continue topamax 50/150mg at this time  Prn flexeril  Continue headache diary

## 2023-07-31 ENCOUNTER — OFFICE VISIT (OUTPATIENT)
Dept: NEUROLOGY | Facility: CLINIC | Age: 28
End: 2023-07-31
Payer: COMMERCIAL

## 2023-07-31 VITALS
HEART RATE: 65 BPM | DIASTOLIC BLOOD PRESSURE: 68 MMHG | SYSTOLIC BLOOD PRESSURE: 120 MMHG | BODY MASS INDEX: 36 KG/M2 | RESPIRATION RATE: 16 BRPM | WEIGHT: 214.63 LBS

## 2023-07-31 DIAGNOSIS — F41.1 GENERALIZED ANXIETY DISORDER: ICD-10-CM

## 2023-07-31 DIAGNOSIS — M26.609 TMJ (TEMPOROMANDIBULAR JOINT SYNDROME): ICD-10-CM

## 2023-07-31 DIAGNOSIS — R41.840 CONCENTRATION DEFICIT: ICD-10-CM

## 2023-07-31 DIAGNOSIS — G43.109 MIGRAINE WITH AURA AND WITHOUT STATUS MIGRAINOSUS, NOT INTRACTABLE: Primary | ICD-10-CM

## 2023-07-31 PROCEDURE — 99214 OFFICE O/P EST MOD 30 MIN: CPT | Performed by: OTHER

## 2023-07-31 PROCEDURE — 3078F DIAST BP <80 MM HG: CPT | Performed by: OTHER

## 2023-07-31 PROCEDURE — 3074F SYST BP LT 130 MM HG: CPT | Performed by: OTHER

## 2023-07-31 RX ORDER — AZELASTINE 1 MG/ML
SPRAY, METERED NASAL 2 TIMES DAILY
COMMUNITY

## 2023-07-31 NOTE — PROGRESS NOTES
Pt states here for follow up on migraines.  Pt states Emgality has been working and migraines are better

## 2023-08-10 ENCOUNTER — LAB ENCOUNTER (OUTPATIENT)
Dept: LAB | Facility: HOSPITAL | Age: 28
End: 2023-08-10
Attending: Other
Payer: COMMERCIAL

## 2023-08-10 ENCOUNTER — OFFICE VISIT (OUTPATIENT)
Dept: OBGYN CLINIC | Facility: CLINIC | Age: 28
End: 2023-08-10
Payer: COMMERCIAL

## 2023-08-10 VITALS
HEIGHT: 65 IN | WEIGHT: 212.63 LBS | SYSTOLIC BLOOD PRESSURE: 126 MMHG | BODY MASS INDEX: 35.43 KG/M2 | DIASTOLIC BLOOD PRESSURE: 78 MMHG

## 2023-08-10 DIAGNOSIS — E28.2 PCOS (POLYCYSTIC OVARIAN SYNDROME): ICD-10-CM

## 2023-08-10 DIAGNOSIS — R41.840 CONCENTRATION DEFICIT: ICD-10-CM

## 2023-08-10 DIAGNOSIS — Z30.432 ENCOUNTER FOR IUD REMOVAL: Primary | ICD-10-CM

## 2023-08-10 LAB
CONTROL LINE PRESENT WITH A CLEAR BACKGROUND (YES/NO): YES YES/NO
PREGNANCY TEST, URINE: NEGATIVE
VIT B12 SERPL-MCNC: 241 PG/ML (ref 193–986)

## 2023-08-10 PROCEDURE — 3008F BODY MASS INDEX DOCD: CPT | Performed by: OBSTETRICS & GYNECOLOGY

## 2023-08-10 PROCEDURE — 3078F DIAST BP <80 MM HG: CPT | Performed by: OBSTETRICS & GYNECOLOGY

## 2023-08-10 PROCEDURE — 3074F SYST BP LT 130 MM HG: CPT | Performed by: OBSTETRICS & GYNECOLOGY

## 2023-08-10 PROCEDURE — 81025 URINE PREGNANCY TEST: CPT | Performed by: OBSTETRICS & GYNECOLOGY

## 2023-08-10 PROCEDURE — 36415 COLL VENOUS BLD VENIPUNCTURE: CPT

## 2023-08-10 PROCEDURE — 99213 OFFICE O/P EST LOW 20 MIN: CPT | Performed by: OBSTETRICS & GYNECOLOGY

## 2023-08-10 PROCEDURE — 58301 REMOVE INTRAUTERINE DEVICE: CPT | Performed by: OBSTETRICS & GYNECOLOGY

## 2023-08-10 PROCEDURE — 82607 VITAMIN B-12: CPT

## 2023-08-10 RX ORDER — MEDROXYPROGESTERONE ACETATE 10 MG/1
10 TABLET ORAL DAILY
Qty: 30 TABLET | Refills: 3 | Status: SHIPPED | OUTPATIENT
Start: 2023-08-10

## 2023-08-10 NOTE — PROCEDURES
Procedure: IUD removal  [facility] 10 OBN    Indications: patient request  Reviewed risks and benefits of procedure - informed consent obtained and time out performed. IUD strings visualized and grasped with ring forceps. IUD removed in its entirety without complication. Patient tolerated the procedure well.   Gaby Helton MD

## 2023-08-15 RX ORDER — LEVOCETIRIZINE DIHYDROCHLORIDE 5 MG/1
5 TABLET, FILM COATED ORAL EVERY EVENING
Qty: 90 TABLET | Refills: 1 | Status: SHIPPED | OUTPATIENT
Start: 2023-08-15

## 2023-08-15 NOTE — TELEPHONE ENCOUNTER
Patient last seen in Allergy 5/9/2023 for . . .    Seasonal and perennial allergic rhinoconjunctivitis  (primary encounter diagnosis)  Adverse food reaction, initial encounter  Food allergy  Covid-19 vaccine series completed  Flu vaccine need    Requested Prescriptions   Pending Prescriptions Disp Refills    LEVOCETIRIZINE 5 MG Oral Tab [Pharmacy Med Name: LEVOCETIRIZINE 5MG TABLETS] 90 tablet 1     Sig: TAKE 1 TABLET(5 MG) BY MOUTH EVERY EVENING       Antihistamines Passed - 8/15/2023  8:04 AM        Passed - Appt in past 12 mos or next 1 mos     Recent Outpatient Visits              5 days ago Encounter for IUD removal    [facility], [address], [address] Triny Avila MD    Office Visit    2 weeks ago Migraine with aura and without status migrainosus, not intractable    [facility], [address] [place] [address], [address] [place] [address]    Office Visit    1 month ago Encounter for IUD insertion    [facility], [address], [address] Triny Avila MD    Office Visit    3 months ago Acne vulgaris    Kenny Haile MD    Office Visit    3 months ago PCOS (polycystic ovarian syndrome)    Trevor Nelson MD    Office Visit          Future Appointments         Provider Department Appt Notes    In 1 week Juan Garcia MD [facility], [facility]     In 4 months Vaughn Nash DO [facility], [address] [place] [address] 5 month follow up                  LEVOCETIRIZINE 5 MG Oral Tab 90 tablet 1 8/15/2023     Sig - Route: TAKE 1 TABLET(5 MG) BY MOUTH EVERY EVENING - Oral    Sent to pharmacy as: Levocetirizine Dihydrochloride 5 MG Oral Tablet (Xyzal)    Notes to Pharmacy: ZERO refills remain on this prescription.  Your patient is requesting advance approval of refills for this medication to PREVENT ANY MISSED DOSES    E-Prescribing Status: Receipt confirmed by pharmacy (8/15/2023  8:12 AM CDT)      Pharmacy    [id number] - [address] AT [address], [phone], [phone]     Prescription as above sent to pharmacy per protocol.

## 2023-08-28 ENCOUNTER — OFFICE VISIT (OUTPATIENT)
Dept: ENDOCRINOLOGY CLINIC | Facility: CLINIC | Age: 28
End: 2023-08-28

## 2023-08-28 VITALS
WEIGHT: 216 LBS | HEIGHT: 65 IN | SYSTOLIC BLOOD PRESSURE: 128 MMHG | DIASTOLIC BLOOD PRESSURE: 86 MMHG | BODY MASS INDEX: 35.99 KG/M2 | HEART RATE: 72 BPM

## 2023-08-28 DIAGNOSIS — E88.81 INSULIN RESISTANCE: ICD-10-CM

## 2023-08-28 DIAGNOSIS — E28.2 PCOS (POLYCYSTIC OVARIAN SYNDROME): ICD-10-CM

## 2023-08-28 DIAGNOSIS — E06.3 HYPOTHYROIDISM DUE TO HASHIMOTO'S THYROIDITIS: Primary | ICD-10-CM

## 2023-08-28 DIAGNOSIS — E03.8 HYPOTHYROIDISM DUE TO HASHIMOTO'S THYROIDITIS: Primary | ICD-10-CM

## 2023-08-28 PROCEDURE — 99214 OFFICE O/P EST MOD 30 MIN: CPT | Performed by: INTERNAL MEDICINE

## 2023-08-28 PROCEDURE — 3074F SYST BP LT 130 MM HG: CPT | Performed by: INTERNAL MEDICINE

## 2023-08-28 PROCEDURE — 3008F BODY MASS INDEX DOCD: CPT | Performed by: INTERNAL MEDICINE

## 2023-08-28 PROCEDURE — 3079F DIAST BP 80-89 MM HG: CPT | Performed by: INTERNAL MEDICINE

## 2023-08-28 RX ORDER — METFORMIN HYDROCHLORIDE 500 MG/1
500 TABLET, EXTENDED RELEASE ORAL 2 TIMES DAILY WITH MEALS
Qty: 180 TABLET | Refills: 1 | Status: SHIPPED | OUTPATIENT
Start: 2023-08-28

## 2023-08-28 RX ORDER — PHENTERMINE HYDROCHLORIDE 15 MG/1
15 CAPSULE ORAL EVERY MORNING
Qty: 30 CAPSULE | Refills: 2 | Status: SHIPPED | OUTPATIENT
Start: 2023-08-28

## 2023-09-08 ENCOUNTER — LAB ENCOUNTER (OUTPATIENT)
Dept: LAB | Facility: HOSPITAL | Age: 28
End: 2023-09-08
Attending: INTERNAL MEDICINE
Payer: COMMERCIAL

## 2023-09-08 DIAGNOSIS — E28.2 PCOS (POLYCYSTIC OVARIAN SYNDROME): ICD-10-CM

## 2023-09-08 DIAGNOSIS — E06.3 HYPOTHYROIDISM DUE TO HASHIMOTO'S THYROIDITIS: ICD-10-CM

## 2023-09-08 DIAGNOSIS — E03.8 HYPOTHYROIDISM DUE TO HASHIMOTO'S THYROIDITIS: ICD-10-CM

## 2023-09-08 LAB
ALBUMIN SERPL-MCNC: 3.8 G/DL (ref 3.4–5)
ALBUMIN/GLOB SERPL: 0.9 {RATIO} (ref 1–2)
ALP LIVER SERPL-CCNC: 113 U/L
ALT SERPL-CCNC: 68 U/L
ANION GAP SERPL CALC-SCNC: 9 MMOL/L (ref 0–18)
AST SERPL-CCNC: 33 U/L (ref 15–37)
BILIRUB SERPL-MCNC: 0.3 MG/DL (ref 0.1–2)
BUN BLD-MCNC: 10 MG/DL (ref 7–18)
BUN/CREAT SERPL: 13.2 (ref 10–20)
CALCIUM BLD-MCNC: 10.2 MG/DL (ref 8.5–10.1)
CHLORIDE SERPL-SCNC: 108 MMOL/L (ref 98–112)
CHOLEST SERPL-MCNC: 220 MG/DL (ref ?–200)
CO2 SERPL-SCNC: 23 MMOL/L (ref 21–32)
CREAT BLD-MCNC: 0.76 MG/DL
EGFRCR SERPLBLD CKD-EPI 2021: 111 ML/MIN/1.73M2 (ref 60–?)
FASTING PATIENT LIPID ANSWER: NO
FASTING STATUS PATIENT QL REPORTED: NO
GLOBULIN PLAS-MCNC: 4.3 G/DL (ref 2.8–4.4)
GLUCOSE BLD-MCNC: 78 MG/DL (ref 70–99)
HDLC SERPL-MCNC: 45 MG/DL (ref 40–59)
LDLC SERPL CALC-MCNC: 122 MG/DL (ref ?–100)
NONHDLC SERPL-MCNC: 175 MG/DL (ref ?–130)
OSMOLALITY SERPL CALC.SUM OF ELEC: 288 MOSM/KG (ref 275–295)
POTASSIUM SERPL-SCNC: 3.7 MMOL/L (ref 3.5–5.1)
PROT SERPL-MCNC: 8.1 G/DL (ref 6.4–8.2)
SODIUM SERPL-SCNC: 140 MMOL/L (ref 136–145)
T4 FREE SERPL-MCNC: 1.1 NG/DL (ref 0.8–1.7)
TRIGL SERPL-MCNC: 302 MG/DL (ref 30–149)
TSI SER-ACNC: 1.37 MIU/ML (ref 0.36–3.74)
VLDLC SERPL CALC-MCNC: 54 MG/DL (ref 0–30)

## 2023-09-08 PROCEDURE — 80053 COMPREHEN METABOLIC PANEL: CPT

## 2023-09-08 PROCEDURE — 84443 ASSAY THYROID STIM HORMONE: CPT

## 2023-09-08 PROCEDURE — 84410 TESTOSTERONE BIOAVAILABLE: CPT

## 2023-09-08 PROCEDURE — 36415 COLL VENOUS BLD VENIPUNCTURE: CPT

## 2023-09-08 PROCEDURE — 80061 LIPID PANEL: CPT

## 2023-09-08 PROCEDURE — 84439 ASSAY OF FREE THYROXINE: CPT

## 2023-09-13 LAB
SEX HORM BIND GLOB: 12.8 NMOL/L
TESTOST % FREE+WEAK BND: 43.7 %
TESTOST FREE+WEAK BND: 10.5 NG/DL
TESTOSTERONE TOT /MS: 24 NG/DL

## 2023-09-14 ENCOUNTER — OFFICE VISIT (OUTPATIENT)
Dept: DERMATOLOGY CLINIC | Facility: CLINIC | Age: 28
End: 2023-09-14

## 2023-09-14 DIAGNOSIS — L70.0 ACNE VULGARIS: Primary | ICD-10-CM

## 2023-09-14 DIAGNOSIS — L73.9 FOLLICULITIS: ICD-10-CM

## 2023-09-14 DIAGNOSIS — L71.9 ROSACEA: ICD-10-CM

## 2023-09-14 DIAGNOSIS — L21.9 SEBORRHEIC DERMATITIS: ICD-10-CM

## 2023-09-14 PROCEDURE — 99214 OFFICE O/P EST MOD 30 MIN: CPT | Performed by: STUDENT IN AN ORGANIZED HEALTH CARE EDUCATION/TRAINING PROGRAM

## 2023-09-14 RX ORDER — CLINDAMYCIN PHOSPHATE 10 UG/ML
LOTION TOPICAL
Qty: 60 ML | Refills: 11 | Status: SHIPPED | OUTPATIENT
Start: 2023-09-14

## 2023-09-14 RX ORDER — FLUOCINONIDE TOPICAL SOLUTION USP, 0.05% 0.5 MG/ML
SOLUTION TOPICAL
Qty: 60 ML | Refills: 5 | Status: SHIPPED | OUTPATIENT
Start: 2023-09-14

## 2023-09-14 RX ORDER — GUAIFENESIN 400 MG
2000 TABLET ORAL DAILY
COMMUNITY

## 2023-09-14 RX ORDER — TRETINOIN 0.5 MG/G
CREAM TOPICAL
Qty: 45 G | Refills: 0 | Status: SHIPPED | OUTPATIENT
Start: 2023-09-14

## 2023-09-14 RX ORDER — KETOCONAZOLE 20 MG/ML
SHAMPOO TOPICAL
Qty: 120 ML | Refills: 11 | Status: SHIPPED | OUTPATIENT
Start: 2023-09-14

## 2023-09-15 RX ORDER — TRETINOIN 0.5 MG/G
CREAM TOPICAL
Qty: 135 G | Refills: 0 | OUTPATIENT
Start: 2023-09-15

## 2023-11-04 ENCOUNTER — OFFICE VISIT (OUTPATIENT)
Dept: FAMILY MEDICINE CLINIC | Facility: CLINIC | Age: 28
End: 2023-11-04
Payer: COMMERCIAL

## 2023-11-04 VITALS
HEART RATE: 67 BPM | DIASTOLIC BLOOD PRESSURE: 88 MMHG | OXYGEN SATURATION: 98 % | WEIGHT: 216 LBS | HEIGHT: 65 IN | BODY MASS INDEX: 35.99 KG/M2 | TEMPERATURE: 98 F | SYSTOLIC BLOOD PRESSURE: 124 MMHG | RESPIRATION RATE: 20 BRPM

## 2023-11-04 DIAGNOSIS — U07.1 COVID-19: Primary | ICD-10-CM

## 2023-11-04 RX ORDER — RIFAMPIN 300 MG/1
600 CAPSULE ORAL NIGHTLY
COMMUNITY

## 2023-11-14 ENCOUNTER — OFFICE VISIT (OUTPATIENT)
Dept: OBGYN CLINIC | Facility: CLINIC | Age: 28
End: 2023-11-14
Payer: COMMERCIAL

## 2023-11-14 VITALS
SYSTOLIC BLOOD PRESSURE: 126 MMHG | WEIGHT: 220.81 LBS | HEIGHT: 65 IN | DIASTOLIC BLOOD PRESSURE: 78 MMHG | BODY MASS INDEX: 36.79 KG/M2

## 2023-11-14 DIAGNOSIS — Z11.3 SCREEN FOR STD (SEXUALLY TRANSMITTED DISEASE): ICD-10-CM

## 2023-11-14 DIAGNOSIS — Z30.09 FAMILY PLANNING: Primary | ICD-10-CM

## 2023-11-14 PROCEDURE — 3074F SYST BP LT 130 MM HG: CPT | Performed by: OBSTETRICS & GYNECOLOGY

## 2023-11-14 PROCEDURE — 87591 N.GONORRHOEAE DNA AMP PROB: CPT | Performed by: OBSTETRICS & GYNECOLOGY

## 2023-11-14 PROCEDURE — 99213 OFFICE O/P EST LOW 20 MIN: CPT | Performed by: OBSTETRICS & GYNECOLOGY

## 2023-11-14 PROCEDURE — 3008F BODY MASS INDEX DOCD: CPT | Performed by: OBSTETRICS & GYNECOLOGY

## 2023-11-14 PROCEDURE — 3078F DIAST BP <80 MM HG: CPT | Performed by: OBSTETRICS & GYNECOLOGY

## 2023-11-14 PROCEDURE — 87491 CHLMYD TRACH DNA AMP PROBE: CPT | Performed by: OBSTETRICS & GYNECOLOGY

## 2023-11-14 PROCEDURE — 96372 THER/PROPH/DIAG INJ SC/IM: CPT | Performed by: OBSTETRICS & GYNECOLOGY

## 2023-11-14 RX ORDER — MEDROXYPROGESTERONE ACETATE 150 MG/ML
150 INJECTION, SUSPENSION INTRAMUSCULAR ONCE
Status: COMPLETED | OUTPATIENT
Start: 2023-11-14 | End: 2023-11-14

## 2023-11-14 RX ADMIN — MEDROXYPROGESTERONE ACETATE 150 MG: 150 INJECTION, SUSPENSION INTRAMUSCULAR at 14:34:00

## 2023-11-15 LAB
C TRACH DNA SPEC QL NAA+PROBE: NEGATIVE
N GONORRHOEA DNA SPEC QL NAA+PROBE: NEGATIVE

## 2023-11-21 DIAGNOSIS — G43.109 MIGRAINE WITH AURA AND WITHOUT STATUS MIGRAINOSUS, NOT INTRACTABLE: ICD-10-CM

## 2023-11-21 NOTE — TELEPHONE ENCOUNTER
Medication: EMGALITY 120 MG/ML Subcutaneous Solution Auto-injector      Date of last refill: 07/24/2023 (#1 mL/2)  [place]     Last office visit: 07/31/2023  Due back to clinic per last office note:  Around 12/31/2023  Date next office visit scheduled:    Future Appointments   Date Time Provider Antoinette Oakley   12/12/2023  1:00 PM Vincent Lebron MD [facility]   1/4/2024  8:15 AM Sandrea Curling, DO [facility]   1/25/2024  4:30 PM Juventino Zuluaga MD [facility]           Last OV note recommendation:    Impression/Plan:  Migraine with aura and without status migrainosus, not intractable  (primary encounter diagnosis)  Tmj (temporomandibular joint syndrome)  Concentration deficit  Generalized anxiety disorder     Decreased concentration, sluggishness- TSH reviewed, check B12, lower topamax and try to wean, main considerations are medication related (topamax), stress (increased family stress in the last 6 months) and anxiety. Has pressures at home due to being oldest sibling and living with parents, expected to help out a lot, always the person that people go to. [facility] navigator referral for generalized anxiety  Lower topamax to 150mg nightly x 3 weeks, then 100mg nightly x 3, then 50mg nightly. Manual for generalized anxiety disorder        Refractory migraines- main triggers of chronic neck and TMJ- improved with Emgality  Ok to lower topamax as was not very effective for her.  Topamax as above  Abortive take sumatriptan 100mg and 2 alleve, immediately at onset  Refractory to Elavil and topamax  Continue Emgality  Prn flexeril

## 2023-11-22 RX ORDER — GALCANEZUMAB 120 MG/ML
120 INJECTION, SOLUTION SUBCUTANEOUS
Qty: 1 ML | Refills: 2 | Status: SHIPPED | OUTPATIENT
Start: 2023-11-22

## 2023-12-12 ENCOUNTER — OFFICE VISIT (OUTPATIENT)
Dept: DERMATOLOGY CLINIC | Facility: CLINIC | Age: 28
End: 2023-12-12
Payer: COMMERCIAL

## 2023-12-12 DIAGNOSIS — L40.0 PSORIASIS VULGARIS: Primary | ICD-10-CM

## 2023-12-12 DIAGNOSIS — L70.0 ACNE VULGARIS: ICD-10-CM

## 2023-12-12 PROCEDURE — 99213 OFFICE O/P EST LOW 20 MIN: CPT | Performed by: STUDENT IN AN ORGANIZED HEALTH CARE EDUCATION/TRAINING PROGRAM

## 2023-12-12 NOTE — PROGRESS NOTES
December 12, 2023    Established patient     CHIEF COMPLAINT: Acne and scalp F/U     HISTORY OF PRESENT ILLNESS: .     1. Acne   Location: Face, chest, back, and butt    Duration: 5 years  Signs and symptoms: Breakouts with sweat/ wearing face mask. Cystic-like acne (on buttocks). Current treatment: clindamycin 1 % Lotion and Tretinoin 0.05 % Cream     2. Itchy Scalp   Location: Scalp             Duration: x 9 months  Signs and symptoms: Itchy, dry, oily scalp and dandruff. Current treatment: ketoconazole 2 % Shampoo and Fluocinonide 0.05%  Solution      DERM HISTORY:  History of skin cancer: No  History of chronic skin disease/condition: No     FAMILY HISTORY:  History of melanoma: No  History of chronic skin disease/condition: No    History/Other:    REVIEW OF SYSTEMS:  Constitutional: Denies fever, chills, unintentional weight loss. Skin as per HPI    PAST MEDICAL HISTORY:  Past Medical History:   Diagnosis Date    Acne 2012    I was about 17years old when it started. Got worse in college ~2018    Allergic rhinitis     since childhood    Anxiety     Chronic migraine     Contact allergic reaction     Environmental allergies     Childhood    Esophageal reflux 2020    had Colonoscopy    Hypothyroidism     Irritable bowel syndrome 2013    runs in family    Kidney stone     Latent tuberculosis by blood test     Obesity 2020    Scoliosis     slight scoliosis    TMJ (dislocation of temporomandibular joint)        Medications  Current Outpatient Medications   Medication Sig Dispense Refill    Galcanezumab-gnlm (EMGALITY) 120 MG/ML Subcutaneous Solution Auto-injector Inject 120 mg into the skin every 30 (thirty) days. 1 mL 2    rifAMPin 300 MG Oral Cap Take 2 capsules (600 mg total) by mouth at bedtime. Cyanocobalamin (VITAMIN B-12 ER) 2000 MCG Oral Tab CR Take 1 tablet (2,000 mcg total) by mouth daily. Fluocinonide 0.05 % External Solution Use twice daily Monday-Friday with flares on scalp.  Do not use on face. 60 mL 5    ketoconazole 2 % External Shampoo Use 2-3 times weekly. Lather into scalp and leave on for 5 minutes before washing off. 120 mL 11    Tretinoin 0.05 % External Cream Apply pea sized amount to the full face at night, making sure to avoid the eyes and lips. Wash off in the morning. Start using every other night and then progress to every night as tolerated. Apply moisturizer nightly to avoid excessive drying 45 g 0    clindamycin 1 % External Lotion Apply twice daily with flares of pimples on body 60 mL 11    LEVOCETIRIZINE 5 MG Oral Tab TAKE 1 TABLET(5 MG) BY MOUTH EVERY EVENING 90 tablet 1    azelastine 0.1 % Nasal Solution by Nasal route 2 (two) times daily. SUMAtriptan Succinate 100 MG Oral Tab TAKE AT ONSET; REPEAT ONCE AFTER 2 HOURS MAXIMUM DAILY DOSE IS 2 TABLETS 9 tablet 0    levothyroxine 137 MCG Oral Tab Take 137 mcg by mouth daily. 90 tablet 2    Vitamin D3, Cholecalciferol, (VITAMIN D3) 125 MCG (5000 UT) Oral Cap Take 1 capsule (5,000 Units total) by mouth daily. MULTIPLE VITAMIN OR Take 1 tablet by mouth daily. Omeprazole 10 MG Oral Capsule Delayed Release Take 1 capsule (10 mg total) by mouth as needed. Objective:    PHYSICAL EXAM:  General: awake, alert, no acute distress  Skin: Skin exam was performed today including the following: face and scalp. Pertinent findings include:   - scalp with pink scaly plaques  - face with few erythematous papules    ASSESSMENT & PLAN:  Pathophysiology of diagnoses discussed with patient. Therapeutic options reviewed. Risks, benefits, and alternatives discussed with patient. Instructions reviewed at length. #Acne Vulgaris, improved  -Continue tretinoin 0.05% to affected areas on face. Okay to use on shoulders as well. #Psoriasis vulgaris  - Will plan to see patient back in a year and discussed treatment options. At this time continue Lidex 0.05% solution as well as ketoconazole 2% shampoo.   Patient not seeing much improvement at this time. Start Skyrizi at next appointment.     Return to clinic: 3 weeks or sooner if something concerning arises     Alysha Cavanaugh MD

## 2023-12-20 ENCOUNTER — PATIENT MESSAGE (OUTPATIENT)
Dept: FAMILY MEDICINE CLINIC | Facility: CLINIC | Age: 28
End: 2023-12-20

## 2023-12-20 ENCOUNTER — PATIENT MESSAGE (OUTPATIENT)
Dept: OBGYN CLINIC | Facility: CLINIC | Age: 28
End: 2023-12-20

## 2023-12-21 NOTE — TELEPHONE ENCOUNTER
Called pt informed will assist with getting Depo scheduled. Scheduled for 02/12 at 0830 am at [facility], Called pt and informed, agrees.

## 2024-01-02 ENCOUNTER — TELEPHONE (OUTPATIENT)
Dept: FAMILY MEDICINE CLINIC | Facility: CLINIC | Age: 29
End: 2024-01-02

## 2024-01-02 ENCOUNTER — TELEPHONE (OUTPATIENT)
Dept: CASE MANAGEMENT | Age: 29
End: 2024-01-02

## 2024-01-02 DIAGNOSIS — E03.8 HYPOTHYROIDISM DUE TO HASHIMOTO'S THYROIDITIS: ICD-10-CM

## 2024-01-02 DIAGNOSIS — E06.3 HYPOTHYROIDISM DUE TO HASHIMOTO'S THYROIDITIS: ICD-10-CM

## 2024-01-02 DIAGNOSIS — L70.9 ACNE, UNSPECIFIED ACNE TYPE: Primary | ICD-10-CM

## 2024-01-02 NOTE — TELEPHONE ENCOUNTER
Ghazal Art,     You have an upcoming appointment with patient on 1/16/24.     Patient is requesting a referral to Dr. Smith for acne on 1/9/24 and Dr. Urrutia for chronic migraines on 1/4/24 and Dr. Son for thyroid on 1/25/24..     Pended referral please review diagnosis and sign off if you agree.    Thank you.  Soila Milian  [facility]

## 2024-01-02 NOTE — TELEPHONE ENCOUNTER
Patient is requesting referral per new HMO insurance in 2024; established patient.      Name of specialist and specialty department :   Dr. Elena Urrutia, Neurology    Reason for visit with the specialist:   chronic migraines - established patient follow up.    Address of the specialist office:  [address] [place] [address] Dr. Morgan [address] [place] [address]  [id number]    Appointment date:    1/4/2024         CSS informed patient the turnaround time for referral is 5-7 business days.  Patient was informed to check their [employer] account for referral status.

## 2024-01-02 NOTE — TELEPHONE ENCOUNTER
Patient is requesting referrals; established patient for continued follow up appts per new HMO Insurance in 2024    Name of specialist and specialty department :   Dr. Lyle Smith, Dermatology    Reason for visit with the specialist:    Acne - follow ups    Address of the specialist office:  [address] [place] [address]    Appointment date:   1/9/2024        Patient is requesting referral.     Name of specialist and specialty department :   Dr. Hamida Son, Endocrinologist    Reason for visit with the specialist:   Hypothyroid - established patient follow ups    Address of the specialist office:  [address] [place] [address]    Appointment date:   1/25/2024         CSS informed patient the turnaround time for referral is 5-7 business days.  Patient was informed to check their MyChart account for referral status.                     CSS informed patient the turnaround time for referral is 5-7 business days.  Patient was informed to check their MyChart account for referral status.

## 2024-01-03 RX ORDER — LEVOTHYROXINE SODIUM 137 UG/1
137 TABLET ORAL DAILY
Qty: 90 TABLET | Refills: 2 | Status: SHIPPED | OUTPATIENT
Start: 2024-01-03 | End: 2024-01-06

## 2024-01-03 NOTE — TELEPHONE ENCOUNTER
Spoke with patient. Patient verbalized understanding. Patient scheduled sooner appointment with Ralf COLON. Appointment with ISAIAH Monroy was cancelled.

## 2024-01-05 ENCOUNTER — LAB ENCOUNTER (OUTPATIENT)
Dept: LAB | Age: 29
End: 2024-01-05
Payer: COMMERCIAL

## 2024-01-05 ENCOUNTER — OFFICE VISIT (OUTPATIENT)
Dept: FAMILY MEDICINE CLINIC | Facility: CLINIC | Age: 29
End: 2024-01-05

## 2024-01-05 VITALS
HEART RATE: 111 BPM | WEIGHT: 227.81 LBS | HEIGHT: 65 IN | BODY MASS INDEX: 37.95 KG/M2 | SYSTOLIC BLOOD PRESSURE: 138 MMHG | DIASTOLIC BLOOD PRESSURE: 87 MMHG

## 2024-01-05 DIAGNOSIS — E03.9 HYPOTHYROIDISM, UNSPECIFIED TYPE: ICD-10-CM

## 2024-01-05 DIAGNOSIS — L40.0 PLAQUE PSORIASIS: ICD-10-CM

## 2024-01-05 DIAGNOSIS — F41.9 ANXIETY: ICD-10-CM

## 2024-01-05 DIAGNOSIS — Z00.00 ENCOUNTER FOR WELLNESS EXAMINATION IN ADULT: Primary | ICD-10-CM

## 2024-01-05 DIAGNOSIS — Z00.00 ENCOUNTER FOR WELLNESS EXAMINATION IN ADULT: ICD-10-CM

## 2024-01-05 DIAGNOSIS — G43.109 MIGRAINE WITH AURA AND WITHOUT STATUS MIGRAINOSUS, NOT INTRACTABLE: ICD-10-CM

## 2024-01-05 LAB
ALBUMIN SERPL-MCNC: 4.7 G/DL (ref 3.2–4.8)
ALBUMIN/GLOB SERPL: 1.5 {RATIO} (ref 1–2)
ALP LIVER SERPL-CCNC: 98 U/L
ALT SERPL-CCNC: 34 U/L
ANION GAP SERPL CALC-SCNC: 6 MMOL/L (ref 0–18)
AST SERPL-CCNC: 23 U/L (ref ?–34)
BASOPHILS # BLD AUTO: 0.08 X10(3) UL (ref 0–0.2)
BASOPHILS NFR BLD AUTO: 0.6 %
BILIRUB SERPL-MCNC: 0.2 MG/DL (ref 0.3–1.2)
BUN BLD-MCNC: 11 MG/DL (ref 9–23)
BUN/CREAT SERPL: 13.8 (ref 10–20)
CALCIUM BLD-MCNC: 9.8 MG/DL (ref 8.7–10.4)
CHLORIDE SERPL-SCNC: 107 MMOL/L (ref 98–112)
CO2 SERPL-SCNC: 27 MMOL/L (ref 21–32)
CREAT BLD-MCNC: 0.8 MG/DL
DEPRECATED RDW RBC AUTO: 39.8 FL (ref 35.1–46.3)
EGFRCR SERPLBLD CKD-EPI 2021: 104 ML/MIN/1.73M2 (ref 60–?)
EOSINOPHIL # BLD AUTO: 0.28 X10(3) UL (ref 0–0.7)
EOSINOPHIL NFR BLD AUTO: 2.1 %
ERYTHROCYTE [DISTWIDTH] IN BLOOD BY AUTOMATED COUNT: 12.4 % (ref 11–15)
EST. AVERAGE GLUCOSE BLD GHB EST-MCNC: 123 MG/DL (ref 68–126)
FASTING STATUS PATIENT QL REPORTED: NO
GLOBULIN PLAS-MCNC: 3.2 G/DL (ref 2.8–4.4)
GLUCOSE BLD-MCNC: 93 MG/DL (ref 70–99)
HBA1C MFR BLD: 5.9 % (ref ?–5.7)
HCT VFR BLD AUTO: 42.2 %
HGB BLD-MCNC: 14.8 G/DL
IMM GRANULOCYTES # BLD AUTO: 0.06 X10(3) UL (ref 0–1)
IMM GRANULOCYTES NFR BLD: 0.4 %
LYMPHOCYTES # BLD AUTO: 4.01 X10(3) UL (ref 1–4)
LYMPHOCYTES NFR BLD AUTO: 29.7 %
MCH RBC QN AUTO: 30.8 PG (ref 26–34)
MCHC RBC AUTO-ENTMCNC: 35.1 G/DL (ref 31–37)
MCV RBC AUTO: 87.9 FL
MONOCYTES # BLD AUTO: 0.94 X10(3) UL (ref 0.1–1)
MONOCYTES NFR BLD AUTO: 7 %
NEUTROPHILS # BLD AUTO: 8.15 X10 (3) UL (ref 1.5–7.7)
NEUTROPHILS # BLD AUTO: 8.15 X10(3) UL (ref 1.5–7.7)
NEUTROPHILS NFR BLD AUTO: 60.2 %
OSMOLALITY SERPL CALC.SUM OF ELEC: 289 MOSM/KG (ref 275–295)
PLATELET # BLD AUTO: 363 10(3)UL (ref 150–450)
POTASSIUM SERPL-SCNC: 4.3 MMOL/L (ref 3.5–5.1)
PROT SERPL-MCNC: 7.9 G/DL (ref 5.7–8.2)
RBC # BLD AUTO: 4.8 X10(6)UL
SODIUM SERPL-SCNC: 140 MMOL/L (ref 136–145)
T4 FREE SERPL-MCNC: 0.7 NG/DL (ref 0.8–1.7)
TSI SER-ACNC: 8.42 MIU/ML (ref 0.55–4.78)
WBC # BLD AUTO: 13.5 X10(3) UL (ref 4–11)

## 2024-01-05 PROCEDURE — 84443 ASSAY THYROID STIM HORMONE: CPT

## 2024-01-05 PROCEDURE — 85025 COMPLETE CBC W/AUTO DIFF WBC: CPT

## 2024-01-05 PROCEDURE — 84439 ASSAY OF FREE THYROXINE: CPT

## 2024-01-05 PROCEDURE — 36415 COLL VENOUS BLD VENIPUNCTURE: CPT

## 2024-01-05 PROCEDURE — 83036 HEMOGLOBIN GLYCOSYLATED A1C: CPT

## 2024-01-05 PROCEDURE — 80053 COMPREHEN METABOLIC PANEL: CPT

## 2024-01-05 RX ORDER — ESCITALOPRAM OXALATE 10 MG/1
10 TABLET ORAL DAILY
Qty: 30 TABLET | Refills: 1 | Status: SHIPPED | OUTPATIENT
Start: 2024-01-05

## 2024-01-05 NOTE — ASSESSMENT & PLAN NOTE
Patient reports her Hashimoto's thyroiditis is managed by her endocrinologist Dr. Son  Requesting referral to Dr. Son  Order placed  TSH with reflex ordered

## 2024-01-05 NOTE — ASSESSMENT & PLAN NOTE
Patient currently receiving Emgality for migraines.  Needs referral to see neurologist who prescribed for follow-up.  Patient requesting referral to neurology Dr. Moore.  Referral placed

## 2024-01-05 NOTE — PROGRESS NOTES
Subjective:   Essence Borja is a 28 year old female who presents for Physical   Patient is a pleasant 28-year-old female with past medical history significant for acne, anxiety, migraines, hypothyroidism, IBS, obesity, scoliosis, and TMJ.  Patient presents to office today for physical.  Patient states she doesn't feel good about her health, feels overweight and fatigued.     Depression score elevated on anxiety medication 6-9 months ago sertraline, stopped taking it. Was seeing a therapist but didn't feel like it was helping. Open to lexapro.    Diet: Doesn't feel like she follows a diet, but does not eat terribly. Craves sweets every now and again. Reports occasional binge eating. For example on osiel had 3 plates when she was full after.   Exercise: 20-30 mins of walking when not cold out. Walks dog and walks at work.   Sleep: Sleeps good. Can't nap anymore. Can't turn her mind off. Feels tired, and then redirects herself. Still 6-8 hours a night.   Stress:  Always feels stressed  and doesn't know why. Talks to family when stressed. Redirects with tv.   Social: Single, frustrated about being single and order. Lives with parents and puppy.  Sexually Active: yes  Prophylaxis: depo shot every 3 months last in 11/23  Alcohol: None  Tobacco: Vapes, all the time. Quit smoking in 2019 with vaping.  Work:  for [employer]. Works at [employer]  Vaccinations: due for covid    Neurology appt on 01/15 for migraines needs referral. Emgality currently and its working.  Dermatology referral requested for plaque psoriasis and acne sees Dr marie  Endocrine referral requested for hashimoto she sees dr Son.              Past Medical History:   Diagnosis Date    Acne 2012    I was about 16 years old when it started. Got worse in college ~2018    Allergic rhinitis     since childhood    Anxiety     Chronic migraine     Contact allergic reaction     Environmental allergies     Childhood    Esophageal  reflux 2020    had Colonoscopy    Hypothyroidism     Irritable bowel syndrome 2013    runs in family    Kidney stone     Latent tuberculosis by blood test     Obesity 2020    Scoliosis     slight scoliosis    TMJ (dislocation of temporomandibular joint)       Past Surgical History:   Procedure Laterality Date    Colonoscopy  2021    Other surgical history  2017    wisdom teeth    [place] teeth removed          History/Other:    Chief Complaint Reviewed and Verified  No Further Nursing Notes to   Review  Tobacco Reviewed  Allergies Reviewed  Medications Reviewed    Problem List Reviewed  Medical History Reviewed  Surgical History   Reviewed  Family History Reviewed  Social History Reviewed         Tobacco:  She smoked tobacco in the past but quit greater than 12 months ago.  Social History    Tobacco Use      Smoking status: Former        Packs/day: 0        Types: Cigarettes        Passive exposure: Never      Smokeless tobacco: Never      Tobacco comments: a few cigarettes a day, now I use a vape       Current Outpatient Medications   Medication Sig Dispense Refill    escitalopram 10 MG Oral Tab Take 1 tablet (10 mg total) by mouth daily. 30 tablet 1    levothyroxine 137 MCG Oral Tab Take 137 mcg by mouth daily. 90 tablet 2    Galcanezumab-gnlm (EMGALITY) 120 MG/ML Subcutaneous Solution Auto-injector Inject 120 mg into the skin every 30 (thirty) days. 1 mL 2    rifAMPin 300 MG Oral Cap Take 2 capsules (600 mg total) by mouth at bedtime.      Cyanocobalamin (VITAMIN B-12 ER) 2000 MCG Oral Tab CR Take 1 tablet (2,000 mcg total) by mouth daily.      Fluocinonide 0.05 % External Solution Use twice daily Monday-Friday with flares on scalp. Do not use on face. 60 mL 5    ketoconazole 2 % External Shampoo Use 2-3 times weekly. Lather into scalp and leave on for 5 minutes before washing off. 120 mL 11    Tretinoin 0.05 % External Cream Apply pea sized amount to the full face at night, making sure to avoid the eyes  and lips. Wash off in the morning. Start using every other night and then progress to every night as tolerated. Apply moisturizer nightly to avoid excessive drying 45 g 0    clindamycin 1 % External Lotion Apply twice daily with flares of pimples on body 60 mL 11    LEVOCETIRIZINE 5 MG Oral Tab TAKE 1 TABLET(5 MG) BY MOUTH EVERY EVENING 90 tablet 1    azelastine 0.1 % Nasal Solution by Nasal route 2 (two) times daily.      SUMAtriptan Succinate 100 MG Oral Tab TAKE AT ONSET; REPEAT ONCE AFTER 2 HOURS MAXIMUM DAILY DOSE IS 2 TABLETS 9 tablet 0    Vitamin D3, Cholecalciferol, (VITAMIN D3) 125 MCG (5000 UT) Oral Cap Take 1 capsule (5,000 Units total) by mouth daily.      MULTIPLE VITAMIN OR Take 1 tablet by mouth daily.      Omeprazole 10 MG Oral Capsule Delayed Release Take 1 capsule (10 mg total) by mouth as needed.           Review of Systems:  Review of Systems   Constitutional:  Positive for fatigue. Negative for activity change and appetite change.   HENT: Negative.  Negative for congestion, postnasal drip, rhinorrhea, sore throat, tinnitus and voice change.    Eyes: Negative.    Respiratory: Negative.  Negative for apnea, cough, chest tightness and shortness of breath.    Cardiovascular:  Negative for chest pain and leg swelling.   Gastrointestinal: Negative.  Negative for abdominal pain, anal bleeding and blood in stool.   Genitourinary: Negative.  Negative for difficulty urinating, flank pain and menstrual problem.   Musculoskeletal: Negative.  Negative for joint swelling.   Skin: Negative.    Neurological: Negative.  Negative for dizziness and headaches.   Psychiatric/Behavioral:  Negative for agitation, self-injury and sleep disturbance. The patient is nervous/anxious.          Objective:   /87 (BP Location: Left arm)   Pulse 111   Ht 5' 5\" (1.651 m)   Wt 227 lb 12.8 oz (103.3 kg)   LMP 10/26/2023   BMI 37.91 kg/m²  Estimated body mass index is 37.91 kg/m² as calculated from the following:     Height as of this encounter: 5' 5\" (1.651 m).    Weight as of this encounter: 227 lb 12.8 oz (103.3 kg).  Physical Exam  Vitals and nursing note reviewed.   Constitutional:       General: She is not in acute distress.     Appearance: Normal appearance. She is well-developed and normal weight.   HENT:      Head: Normocephalic and atraumatic.      Right Ear: Tympanic membrane, ear canal and external ear normal.      Left Ear: Tympanic membrane, ear canal and external ear normal.      Nose: Congestion present.      Mouth/Throat:      Mouth: Mucous membranes are moist.      Pharynx: Oropharynx is clear.   Eyes:      Conjunctiva/sclera: Conjunctivae normal.      Pupils: Pupils are equal, round, and reactive to light.   Neck:      Thyroid: No thyromegaly.   Cardiovascular:      Rate and Rhythm: Normal rate and regular rhythm.      Pulses: Normal pulses.      Heart sounds: Normal heart sounds. No murmur heard.  Pulmonary:      Effort: Pulmonary effort is normal. No respiratory distress.      Breath sounds: Normal breath sounds. No wheezing or rales.   Chest:      Chest wall: No tenderness.   Abdominal:      General: Bowel sounds are normal. There is no distension.      Palpations: Abdomen is soft.      Tenderness: There is no abdominal tenderness.   Musculoskeletal:         General: No tenderness. Normal range of motion.      Cervical back: Normal range of motion and neck supple.   Lymphadenopathy:      Cervical: No cervical adenopathy.   Skin:     General: Skin is warm and dry.      Capillary Refill: Capillary refill takes less than 2 seconds.      Findings: No rash.      Comments: Plaque psoriasis to right posterior ear.   Neurological:      Mental Status: She is alert and oriented to person, place, and time.      Coordination: Coordination normal.   Psychiatric:         Behavior: Behavior normal.         Thought Content: Thought content normal.         Judgment: Judgment normal.           Assessment & Plan:   1.  Encounter for wellness examination in adult (Primary)  Assessment & Plan:  Wellness labs ordered.  Encouraged increasing physical activity which includes raising heart rate 3 to 5 days/week for at least 30 minutes at a time, while also performing mild strength exercises.  Patient counseled on importance of dietary modifications, which may include limiting fats, red meat, and carbohydrates, while increasing fruit and vegetable intake, and trying to adhere to a low sodium/Mediterranean diet.  Encouraged to manage stress.  Encouraged good sleep habits.  Encouraged good sexual health.    Patient aware of plan of care. All questions answered to satisfaction of the patient. Patient instructed to call office or reach out via [employer] [facility] if any issues arise. For urgent issues and/or reviewed red flags please proceed to the urgent care or ER.  Also, inform the nurse practitioner with any new symptoms or medication side effects.        Orders:  -     Hemoglobin A1C; Future; Expected date: 01/05/2024  -     CBC With Differential With Platelet; Future; Expected date: 01/05/2024  -     Comp Metabolic Panel (14); Future; Expected date: 01/05/2024  -     TSH W Reflex To Free T4; Future; Expected date: 01/05/2024  2. Migraine with aura and without status migrainosus, not intractable  Assessment & Plan:  Patient currently receiving Emgality for migraines.  Needs referral to see neurologist who prescribed for follow-up.  Patient requesting referral to neurology Dr. Moore.  Referral placed  Orders:  -     Neuro Referral - In Network  3. Plaque psoriasis  Assessment & Plan:  Patient has already seen Dr. Bernal dermatology patient looking for referral to dermatology  Looking to start medications for treatment.   Referral placed    Orders:  -     DERM - INTERNAL  4. Hypothyroidism, unspecified type  Assessment & Plan:  Patient reports her Hashimoto's thyroiditis is managed by her endocrinologist Dr. Son  Requesting referral to   [facility]  Order placed  TSH with reflex ordered  Orders:  -     Endocrine Referral - In Network  5. Anxiety  Assessment & Plan:   Previously on SSRI sertraline.  Some self discontinued 9 months ago due to not liking the way it made her feel and/or not working.  Patient willing to start Lexapro.  Start Lexapro 10 daily follow-up 4 weeks.  Orders:  -     Escitalopram Oxalate; Take 1 tablet (10 mg total) by mouth daily.  Dispense: 30 tablet; Refill: 1        Return in about 4 weeks (around 2/2/2024) for Lexapro eval.    ISAIAH Rascon, 1/5/2024, 11:01 AM

## 2024-01-05 NOTE — ASSESSMENT & PLAN NOTE
Wellness labs ordered.  Encouraged increasing physical activity which includes raising heart rate 3 to 5 days/week for at least 30 minutes at a time, while also performing mild strength exercises.  Patient counseled on importance of dietary modifications, which may include limiting fats, red meat, and carbohydrates, while increasing fruit and vegetable intake, and trying to adhere to a low sodium/Mediterranean diet.  Encouraged to manage stress.  Encouraged good sleep habits.  Encouraged good sexual health.    Patient aware of plan of care. All questions answered to satisfaction of the patient. Patient instructed to call office or reach out via [employer] if any issues arise. For urgent issues and/or reviewed red flags please proceed to the urgent care or ER.  Also, inform the nurse practitioner with any new symptoms or medication side effects.

## 2024-01-05 NOTE — ASSESSMENT & PLAN NOTE
Patient has already seen Dr. Bernal dermatology patient looking for referral to dermatology  Looking to start medications for treatment.   Referral placed

## 2024-01-05 NOTE — ASSESSMENT & PLAN NOTE
Previously on SSRI sertraline.  Some self discontinued 9 months ago due to not liking the way it made her feel and/or not working.  Patient willing to start Lexapro.  Start Lexapro 10 daily follow-up 4 weeks.

## 2024-01-06 DIAGNOSIS — E03.9 HYPOTHYROIDISM, UNSPECIFIED TYPE: Primary | ICD-10-CM

## 2024-01-06 RX ORDER — LEVOTHYROXINE SODIUM 0.15 MG/1
150 TABLET ORAL DAILY
Qty: 60 TABLET | Refills: 0 | Status: SHIPPED | OUTPATIENT
Start: 2024-01-06

## 2024-01-06 NOTE — PROGRESS NOTES
1. Hypothyroidism, unspecified type  TSH elevated.  T4 low  Patient to follow-up with Dr. Son.  In the meantime increase levothyroxine to 150 mcg every morning recheck TSH in 6 weeks.    Patient aware of plan of care. All questions answered to satisfaction of the patient. Patient instructed to call office or reach out via [employer] if any issues arise. For urgent issues and/or reviewed red flags please proceed to the urgent care or ER.  Also, inform the nurse practitioner with any new symptoms or medication side effects.    ISAIAH Rascon

## 2024-01-08 ENCOUNTER — TELEPHONE (OUTPATIENT)
Dept: FAMILY MEDICINE CLINIC | Facility: CLINIC | Age: 29
End: 2024-01-08

## 2024-01-10 ENCOUNTER — TELEPHONE (OUTPATIENT)
Dept: NEUROLOGY | Facility: CLINIC | Age: 29
End: 2024-01-10

## 2024-01-15 ENCOUNTER — OFFICE VISIT (OUTPATIENT)
Dept: NEUROLOGY | Facility: CLINIC | Age: 29
End: 2024-01-15
Payer: COMMERCIAL

## 2024-01-15 ENCOUNTER — TELEPHONE (OUTPATIENT)
Dept: NEUROLOGY | Facility: CLINIC | Age: 29
End: 2024-01-15

## 2024-01-15 VITALS
WEIGHT: 221 LBS | HEIGHT: 65 IN | HEART RATE: 84 BPM | BODY MASS INDEX: 36.82 KG/M2 | DIASTOLIC BLOOD PRESSURE: 82 MMHG | SYSTOLIC BLOOD PRESSURE: 110 MMHG | RESPIRATION RATE: 16 BRPM

## 2024-01-15 DIAGNOSIS — F41.9 ANXIETY: ICD-10-CM

## 2024-01-15 DIAGNOSIS — G89.29 CHRONIC NECK PAIN: ICD-10-CM

## 2024-01-15 DIAGNOSIS — E53.8 B12 DEFICIENCY: Primary | ICD-10-CM

## 2024-01-15 DIAGNOSIS — M54.2 CHRONIC NECK PAIN: ICD-10-CM

## 2024-01-15 DIAGNOSIS — G43.109 MIGRAINE WITH AURA AND WITHOUT STATUS MIGRAINOSUS, NOT INTRACTABLE: ICD-10-CM

## 2024-01-15 PROCEDURE — 99214 OFFICE O/P EST MOD 30 MIN: CPT | Performed by: OTHER

## 2024-01-15 PROCEDURE — 3079F DIAST BP 80-89 MM HG: CPT | Performed by: OTHER

## 2024-01-15 PROCEDURE — 3008F BODY MASS INDEX DOCD: CPT | Performed by: OTHER

## 2024-01-15 PROCEDURE — 3074F SYST BP LT 130 MM HG: CPT | Performed by: OTHER

## 2024-01-15 RX ORDER — SUMATRIPTAN 100 MG/1
TABLET, FILM COATED ORAL
Qty: 9 TABLET | Refills: 2 | Status: SHIPPED | OUTPATIENT
Start: 2024-01-15

## 2024-01-15 NOTE — PATIENT INSTRUCTIONS
Refill policies:    Allow 2-3 business days for refills; controlled substances may take longer.  Contact your pharmacy at least 5 days prior to running out of medication and have them send an electronic request or submit request through the “request refill” option in your [employer] account.  Refills are not addressed on weekends; covering physicians do not authorize routine medications on weekends.  No narcotics or controlled substances are refilled after noon on Fridays or by on call physicians.  By law, narcotics must be electronically prescribed.  A 30 day supply with no refills is the maximum allowed.  If your prescription is due for a refill, you may be due for a follow up appointment.  To best provide you care, patients receiving routine medications need to be seen at least once a year.  Patients receiving narcotic/controlled substance medications need to be seen at least once every 3 months.  In the event that your preferred pharmacy does not have the requested medication in stock (e.g. Backordered), it is your responsibility to find another pharmacy that has the requested medication available.  We will gladly send a new prescription to that pharmacy at your request.    Scheduling Tests:    If your physician has ordered radiology tests such as MRI or CT scans, please contact Central Scheduling at [phone] right away to schedule the test.  Once scheduled, the [facility] Centralized Referral Team will work with your insurance carrier to obtain pre-certification or prior authorization.  Depending on your insurance carrier, approval may take 3-10 days.  It is highly recommended patients assure they have received an authorization before having a test performed.  If test is done without insurance authorization, patient may be responsible for the entire amount billed.      Precertification and Prior Authorizations:  If your physician has recommended that you have a procedure or additional testing performed the [facility]  Centralized Referral Team will contact your insurance carrier to obtain pre-certification or prior authorization.    You are strongly encouraged to contact your insurance carrier to verify that your procedure/test has been approved and is a COVERED benefit.  Although the [facility] Centralized Referral Team does its due diligence, the insurance carrier gives the disclaimer that \"Although the procedure is authorized, this does not guarantee payment.\"    Ultimately the patient is responsible for payment.   Thank you for your understanding in this matter.  Paperwork Completion:  If you require FMLA or disability paperwork for your recovery, please make sure to either drop it off or have it faxed to our office at [phone]. Be sure the form has your name and date of birth on it.  The form will be faxed to our Forms Department and they will complete it for you.  There is a 25$ fee for all forms that need to be filled out.  Please be aware there is a 10-14 day turnaround time.  You will need to sign a release of information (ALCON) form if your paperwork does not come with one.  You may call the Forms Department with any questions at [phone].  Their fax number is [phone].

## 2024-01-15 NOTE — PROGRESS NOTES
[facility] - [facility]   Neurology; follow up  CLINIC VISIT      Essence Borja Patient Status:  No patient class for patient encounter    1995 MRN [id number]   Location [facility] PCP Ruby Pedro MD     REASON FOR THE VISIT:  Chief Complaint   Patient presents with    Follow - Up     5mo follow-up for migraines - per Pt, she is feeling \"a lot better\"  and that the injection helps. Migraines not as often.       HISTORY OF PRESENT ILLNESS:  Essence Borja is a(n) 28 year old, right handed,  female who presents for migraine management.   She has migraine HA for long time since age of 6 years old, her mother said she was complains HA and nausea a lot,  she was sick a lot, nausea, HA, when she grows old it was less frequent for a while, then last few years, more often her HA, it started in her neck, moved to front and temporal region, she has sick feeling, BRUNO is pounding pain, with sweat, nausea with HA, can be moderate to severe, 9-10/10, it made her cry,  HA was three times a week, Imitrex was given, initially helped, not anymore, not working anymore, she tried Excedrin, Fioricet, flexeril, meclizine, Reglan in past, none of those helped,   She has anxiety, was put on Zoloft, well control of anxiety, she was given Topamax 100 mg at bedtime last six years for prevention, she tolerated it. She has many brain imaging test in past always normal,   She has dizziness, vertigo, poor balance, blurred vision when her HA is severe, she wakes up with HA in am, lasting whole day, if she catches sooner enough to take Imitrex she feels relief few hours later, she feels red onion triggered HA, Her sleep is fine, Her stress level is same, she has anxiety, stressed out easily,   BRUNO can be 9/10, she deangelo and nausea and vomiting, no weakness or numbness. She is here for HA management. She was last seen on 2022 for migraine management. I give her Topamax, upgraded to 50  mg am, 100 mg pm, she feels better in her HA, less frequent but still 2-3 times a week, she tolerated Topamax well, she feels Maxalt, Imitrex did not help acute HA.   Interim  Eletriptan doesn't work as well as sumatriptan. Topamax 50/150mg has made migraines less frequent. Wakes up with migraines, occipital left sided. Still getting very many headaches per month, at times 2-3 times a week, other times more. Overall less intense.  Interim  Since last visit, having 15 migraines per month. Still having left sided neck pain, trapezius and into occipital area. Did chiropractor treatment a year ago. Did not do the PT that was ordered. Takes flexeril occasionally. Nurtec was not helpful. She is a /. Went to a TMJ dentist, got a nightguard.  Interim  Since starting Emgality 4 months ago, migraines are much better. Having them 3-4 times a month. Jaw does not seem to be clenching at night. Neck pain is better. Wearing nightguard, got an additional one. In the last 6 months, patient has noticed is more sluggish, forgetful. Has a hard time telling a story often, thoughts are jumbled. Ran out of topamax for one month, migraines did not improve. Sleeps at least 6 hours nightly.   Interim  Since Emgality migraines are improved. Stopped sertraline and started lexapro. Sluggishness and decreased concentration improved since off Topamax. Imitrex 100mg aborts headache. Headaches 5 times a month now.             Meds tried  Amitriptyline in the past, not effective  Topamax 50/150 not effective  Nurtec- not effective  Eletriptan, rizatriptan, fiorcet, excedrin- not effective    Component      Latest Ref Rng 8/10/2023 9/8/2023   Vitamin B12      193 - 986 pg/mL 241     TSH      0.358 - 3.740 mIU/mL  1.370    T4,Free (Direct)      0.8 - 1.7 ng/dL  1.1            PAST MEDICAL HISTORY:  Past Medical History:   Diagnosis Date    Acne 2012    I was about 16 years old when it started. Got worse in college ~2018     Allergic rhinitis     since childhood    Anxiety     Chronic migraine     Contact allergic reaction     Environmental allergies     Childhood    Esophageal reflux 2020    had Colonoscopy    Hypothyroidism     Irritable bowel syndrome 2013    runs in family    Kidney stone     Latent tuberculosis by blood test     Obesity 2020    Scoliosis     slight scoliosis    TMJ (dislocation of temporomandibular joint)        PAST SURGICAL HISTORY:  Past Surgical History:   Procedure Laterality Date    COLONOSCOPY  2021    OTHER SURGICAL HISTORY  2017    wisdom teeth    WISDOM TEETH REMOVED         FAMILY HISTORY:  family history includes Dementia in her maternal grandmother; Diabetes in her mother; Heart Disorder in her maternal grandfather and sister; Obesity in her maternal grandfather, maternal grandmother, mother, and paternal grandmother.    SOCIAL HISTORY:   reports that she has quit smoking. Her smoking use included cigarettes. She has never been exposed to tobacco smoke. She has never used smokeless tobacco. She reports that she does not currently use alcohol. She reports that she does not use drugs.    ALLERGIES:  Allergies   Allergen Reactions    Doxycycline NAUSEA AND VOMITING    Hydrocodone NAUSEA AND VOMITING     Narco    Tree Nuts OTHER (SEE COMMENTS)     Abdominal cramps       MEDICATIONS:  Current Outpatient Medications   Medication Sig Dispense Refill    SUMAtriptan Succinate 100 MG Oral Tab TAKE AT ONSET; REPEAT ONCE AFTER 2 HOURS MAXIMUM DAILY DOSE IS 2 TABLETS 9 tablet 2    levothyroxine 150 MCG Oral Tab Take 1 tablet (150 mcg total) by mouth daily. 60 tablet 0    escitalopram 10 MG Oral Tab Take 1 tablet (10 mg total) by mouth daily. 30 tablet 1    Galcanezumab-gnlm (EMGALITY) 120 MG/ML Subcutaneous Solution Auto-injector Inject 120 mg into the skin every 30 (thirty) days. 1 mL 2    rifAMPin 300 MG Oral Cap Take 2 capsules (600 mg total) by mouth at bedtime.      Cyanocobalamin (VITAMIN B-12 ER) 2000 MCG  Oral Tab CR Take 1 tablet (2,000 mcg total) by mouth daily.      Fluocinonide 0.05 % External Solution Use twice daily Monday-Friday with flares on scalp. Do not use on face. 60 mL 5    ketoconazole 2 % External Shampoo Use 2-3 times weekly. Lather into scalp and leave on for 5 minutes before washing off. 120 mL 11    Tretinoin 0.05 % External Cream Apply pea sized amount to the full face at night, making sure to avoid the eyes and lips. Wash off in the morning. Start using every other night and then progress to every night as tolerated. Apply moisturizer nightly to avoid excessive drying 45 g 0    clindamycin 1 % External Lotion Apply twice daily with flares of pimples on body 60 mL 11    LEVOCETIRIZINE 5 MG Oral Tab TAKE 1 TABLET(5 MG) BY MOUTH EVERY EVENING 90 tablet 1    azelastine 0.1 % Nasal Solution by Nasal route 2 (two) times daily.      Vitamin D3, Cholecalciferol, (VITAMIN D3) 125 MCG (5000 UT) Oral Cap Take 1 capsule (5,000 Units total) by mouth daily.      MULTIPLE VITAMIN OR Take 1 tablet by mouth daily.      Omeprazole 10 MG Oral Capsule Delayed Release Take 1 capsule (10 mg total) by mouth as needed.           REVIEW OF SYSTEMS:    GENERAL HEALTH:  feels well, calm, normal appetite,   EYES: no visual complaints or deficits  HEENT: denies nasal congestion, sinus pain or sore throat; no  hearing loss;  RESPIRATORY: denies shortness of breath, wheezing or cough   CARDIOVASCULAR: denies chest pain, no palpitations   GI: denies nausea, vomiting, constipation, diarrhea; no rectal bleeding; no heartburn  GENITAL/: no dysuria, urgency or frequency;  no hernias; no nocturia  GYNE/: no dysuria, urgency or frequency; no urinary incontinence  MUSCULOSKELETAL: no joint complaints in  upper or lower extremities  NEURO: see HPI;  PSYCHE: no symptoms of depression or anxiety  HEMATOLOGY:  denies bruising or excessive bleeding  ENDOCRINE: denies excessive thirst or urination; denies unexpected wt gain or wt  loss  ALLERGY/IMM.: denies food or seasonal allergies      PHYSICAL EXAMINATION:  VITAL SIGNS: /82   Pulse 84   Resp 16   Ht 65\"   Wt 221 lb (100.2 kg)   LMP 10/26/2023   BMI 36.78 kg/m²    Body mass index is 36.78 kg/m².  General:  Patient is a 28 year old female in no acute distress.  appearance: Normal developed and well nourished , in no acute stress;  HEENT:  Normal conjunctiva, no abnormal secretion, normal structure of skull, no tenderness  Neck supple,  No carotid bruit,  thyroid normal  Lungs are clear to auscultation  Heart: normal SR, no murmur  Extremities:  No edema or cyanosis, radial and pedal pulse is normal.  Skin:  No unusual lesions    Neurologic Examination:  Mental status: he is awake, alert, oriented to time, name and place, Mentally appropriate  for age;  Language and speech: language is intact in expression and comprehension, no dysarthria, voice is normal in volume, follow commends well;  Memory and comprehension:  MMSE is normal,   Cranial Nerves       CN II:  Visual fields full, Pupils equal and reactive, Fundi normal       CN III, IV, VI:  Horrizontal and vertical movements normal.  Normal pursuit and saccades.                            No nystagmus, no ptosis       CN V: Masseters strong, Facial sensations normal,        CN  VII:  Symmetric facial movement       CN VIII:  Normal hearing       CN IX, XI:  Normal Gag, uvula palate midline       CN XII:  SCM strong, equal shoulder shrug  Motor:  No drift, rapid finger movement symmetric. 5/5 in all limbs with normal tone. No tremor of any kind;   Sensory;  Intact to light touch, temperature, vibration and proprioception;  DTRs;  Symmetric in both arms and legs, including biceps, triceps, knees, ankles,  Babinski sign is negative;  Coordination: Normal FTN and HTS;   Gait: Normal based,  Romberg sign is negative, Tandem walk is normal        LABS:     Reviewed with patient         Impression/Plan:  Encounter Diagnoses   Name  Primary?    Migraine with aura and without status migrainosus, not intractable     B12 deficiency Yes    Anxiety     Chronic neck pain        Decreased concentration, sluggishness- likely related to B12 deficiency and topamax, improved with stopping topamax and starting B12 po supplement    B12 deficiency- continue B12 1000mcg daily, and recheck B12 level, on chronic Nexium level    Refractory migraines- main triggers of chronic neck and TMJ, anxiety  Continue Emgality, working well  Abortive take sumatriptan 100mg and 2 alleve, immediately at onset  Refractory to Elavil and topamax  Prn flexeril  Re-refer to [facility] navigator for anxiety due to insurance change    Requested Prescriptions     Signed Prescriptions Disp Refills    SUMAtriptan Succinate 100 MG Oral Tab 9 tablet 2     Sig: TAKE AT ONSET; REPEAT ONCE AFTER 2 HOURS MAXIMUM DAILY DOSE IS 2 TABLETS       We discussed in depth regarding the diagnosis, prognosis, treatment.  The patient was given ample opportunity to ask questions. All questions and concerns were addressed.     Nelly Moore, DO  Neurology and Neuromuscular medicine  [facility]

## 2024-01-16 NOTE — TELEPHONE ENCOUNTER
Received fax from [employer].  Approval from 01/16/2024-07/16/2024  Medication Emgality 120 mg/ml  Case number [id number]  Faxed approval to dispensing pharmacy  Received fax confirmation

## 2024-01-21 DIAGNOSIS — E03.9 HYPOTHYROIDISM, UNSPECIFIED TYPE: Primary | ICD-10-CM

## 2024-01-23 ENCOUNTER — LAB ENCOUNTER (OUTPATIENT)
Dept: LAB | Age: 29
End: 2024-01-23
Attending: STUDENT IN AN ORGANIZED HEALTH CARE EDUCATION/TRAINING PROGRAM
Payer: COMMERCIAL

## 2024-01-23 ENCOUNTER — TELEPHONE (OUTPATIENT)
Dept: DERMATOLOGY CLINIC | Facility: CLINIC | Age: 29
End: 2024-01-23

## 2024-01-23 ENCOUNTER — OFFICE VISIT (OUTPATIENT)
Dept: DERMATOLOGY CLINIC | Facility: CLINIC | Age: 29
End: 2024-01-23
Payer: COMMERCIAL

## 2024-01-23 DIAGNOSIS — Z51.81 MEDICATION MONITORING ENCOUNTER: ICD-10-CM

## 2024-01-23 DIAGNOSIS — Z51.81 MEDICATION MONITORING ENCOUNTER: Primary | ICD-10-CM

## 2024-01-23 DIAGNOSIS — L40.9 PSORIASIS: ICD-10-CM

## 2024-01-23 LAB
HAV AB SER QL IA: REACTIVE
HAV IGM SER QL: NONREACTIVE
HBV CORE AB SERPL QL IA: NONREACTIVE
HBV SURFACE AB SER QL: NONREACTIVE
HBV SURFACE AB SERPL IA-ACNC: 8.44 MIU/ML
HBV SURFACE AG SERPL QL IA: NONREACTIVE
HCV AB SERPL QL IA: NONREACTIVE

## 2024-01-23 PROCEDURE — 80503 PATH CLIN CONSLTJ SF 5-20: CPT

## 2024-01-23 PROCEDURE — 86480 TB TEST CELL IMMUN MEASURE: CPT

## 2024-01-23 PROCEDURE — 86709 HEPATITIS A IGM ANTIBODY: CPT

## 2024-01-23 PROCEDURE — 36415 COLL VENOUS BLD VENIPUNCTURE: CPT

## 2024-01-23 PROCEDURE — 86706 HEP B SURFACE ANTIBODY: CPT

## 2024-01-23 PROCEDURE — 87340 HEPATITIS B SURFACE AG IA: CPT

## 2024-01-23 PROCEDURE — 99214 OFFICE O/P EST MOD 30 MIN: CPT | Performed by: STUDENT IN AN ORGANIZED HEALTH CARE EDUCATION/TRAINING PROGRAM

## 2024-01-23 PROCEDURE — 86708 HEPATITIS A ANTIBODY: CPT

## 2024-01-23 PROCEDURE — 86704 HEP B CORE ANTIBODY TOTAL: CPT

## 2024-01-23 PROCEDURE — 86803 HEPATITIS C AB TEST: CPT

## 2024-01-23 RX ORDER — LEVOTHYROXINE SODIUM 0.15 MG/1
150 TABLET ORAL DAILY
Qty: 90 TABLET | Refills: 0 | Status: SHIPPED | OUTPATIENT
Start: 2024-01-23

## 2024-01-23 NOTE — PROGRESS NOTES
January 23, 2024    Established patient     CHIEF COMPLAINT: Psoriasis    HISTORY OF PRESENT ILLNESS: .    1. Psoriasis  Location: Face and Scalp   Duration: 10 months  Signs and symptoms: Dry and itchy  Current treatment: Ketoconazole shampoo and Fluocinonide sol.        DERM HISTORY:  History of skin cancer: No  History of chronic skin disease/condition: No    FAMILY HISTORY:  History of melanoma: No  History of chronic skin disease/condition: No    History/Other:    REVIEW OF SYSTEMS:  Constitutional: Denies fever, chills, unintentional weight loss.   Skin as per HPI    PAST MEDICAL HISTORY:  Past Medical History:   Diagnosis Date    Acne 2012    I was about 16 years old when it started. Got worse in college ~2018    Allergic rhinitis     since childhood    Anxiety     Chronic migraine     Contact allergic reaction     Environmental allergies     Childhood    Esophageal reflux 2020    had Colonoscopy    Hypothyroidism     Irritable bowel syndrome 2013    runs in family    Kidney stone     Latent tuberculosis by blood test     Obesity 2020    Scoliosis     slight scoliosis    TMJ (dislocation of temporomandibular joint)        Medications  Current Outpatient Medications   Medication Sig Dispense Refill    SUMAtriptan Succinate 100 MG Oral Tab TAKE AT ONSET; REPEAT ONCE AFTER 2 HOURS MAXIMUM DAILY DOSE IS 2 TABLETS 9 tablet 2    levothyroxine 150 MCG Oral Tab Take 1 tablet (150 mcg total) by mouth daily. 60 tablet 0    escitalopram 10 MG Oral Tab Take 1 tablet (10 mg total) by mouth daily. 30 tablet 1    Galcanezumab-gnlm (EMGALITY) 120 MG/ML Subcutaneous Solution Auto-injector Inject 120 mg into the skin every 30 (thirty) days. 1 mL 2    rifAMPin 300 MG Oral Cap Take 2 capsules (600 mg total) by mouth at bedtime.      Cyanocobalamin (VITAMIN B-12 ER) 2000 MCG Oral Tab CR Take 1 tablet (2,000 mcg total) by mouth daily.      Fluocinonide 0.05 % External Solution Use twice daily Monday-Friday with flares on scalp. Do  not use on face. 60 mL 5    ketoconazole 2 % External Shampoo Use 2-3 times weekly. Lather into scalp and leave on for 5 minutes before washing off. 120 mL 11    Tretinoin 0.05 % External Cream Apply pea sized amount to the full face at night, making sure to avoid the eyes and lips. Wash off in the morning. Start using every other night and then progress to every night as tolerated. Apply moisturizer nightly to avoid excessive drying 45 g 0    clindamycin 1 % External Lotion Apply twice daily with flares of pimples on body 60 mL 11    LEVOCETIRIZINE 5 MG Oral Tab TAKE 1 TABLET(5 MG) BY MOUTH EVERY EVENING 90 tablet 1    azelastine 0.1 % Nasal Solution by Nasal route 2 (two) times daily.      Vitamin D3, Cholecalciferol, (VITAMIN D3) 125 MCG (5000 UT) Oral Cap Take 1 capsule (5,000 Units total) by mouth daily.      MULTIPLE VITAMIN OR Take 1 tablet by mouth daily.      Omeprazole 10 MG Oral Capsule Delayed Release Take 1 capsule (10 mg total) by mouth as needed.         Objective:    PHYSICAL EXAM:  General: awake, alert, no acute distress  Skin: Skin exam was performed today including the following: scalp. Pertinent findings include:   - with think pink scaly plaques     ASSESSMENT & PLAN:  Pathophysiology of diagnoses discussed with patient.  Therapeutic options reviewed. Risks, benefits, and alternatives discussed with patient. Instructions reviewed at length.    #Psoriasis, scalp with 5% BSA. Currently undergoing treatment for latent TB and will need clearance letter from ID.   -Previously tried: topical steroids  -Pt declines h/o TB infection, malignancy, active infection  -Discussed potential risks of Skyrizi including: increased risk of infections (including TB), fatigue, injection site reactions, hypersensitivity, headache, increased liver enzymes, upper respiratory infections, fungal skin infections. Possible increased risk of malignancy.   -Medication should not be used in patients with chronic infections  or h/o recurrent infections  -It is unclear whether this medication may increase patient's risk for COVID-19 infection or severity of infection should they contract COVID-19. Practice strict infection protection measures.   -Patient expressed understanding of risks and agreeable to starting therapy.    -Pending normal labs would start Skyrizi as follows - Two consecutive injections at different anatomic locations (75 mg each) for a total dose of 150 mg at weeks 0, 4 and then every 12 weeks thereafter.   -Pt should notify us when medication to be delivered. Pt should schedule nursing visit for injection training.   -Pt should notify us if they develop any infections at which time medication would be discontinued until resolved.     High-risk Medication monitoring:   -Remote hepatitis panel  -TB screening today, repeat annually  -No live vaccines  -Flu shot yearly  -Not safe in pregnancy/breastfeeding    RTC: Follow up every 3 months    Lyle Smith MD

## 2024-01-25 ENCOUNTER — OFFICE VISIT (OUTPATIENT)
Dept: ENDOCRINOLOGY CLINIC | Facility: CLINIC | Age: 29
End: 2024-01-25
Payer: COMMERCIAL

## 2024-01-25 ENCOUNTER — PATIENT MESSAGE (OUTPATIENT)
Dept: ENDOCRINOLOGY CLINIC | Facility: CLINIC | Age: 29
End: 2024-01-25

## 2024-01-25 VITALS
BODY MASS INDEX: 38 KG/M2 | SYSTOLIC BLOOD PRESSURE: 132 MMHG | WEIGHT: 226 LBS | DIASTOLIC BLOOD PRESSURE: 90 MMHG | HEART RATE: 98 BPM

## 2024-01-25 DIAGNOSIS — E06.3 HYPOTHYROIDISM DUE TO HASHIMOTO'S THYROIDITIS: Primary | ICD-10-CM

## 2024-01-25 DIAGNOSIS — E88.810 METABOLIC SYNDROME: ICD-10-CM

## 2024-01-25 DIAGNOSIS — E03.8 HYPOTHYROIDISM DUE TO HASHIMOTO'S THYROIDITIS: Primary | ICD-10-CM

## 2024-01-25 DIAGNOSIS — E66.9 CLASS 2 OBESITY WITHOUT SERIOUS COMORBIDITY WITH BODY MASS INDEX (BMI) OF 35.0 TO 35.9 IN ADULT, UNSPECIFIED OBESITY TYPE: ICD-10-CM

## 2024-01-25 LAB
M TB IFN-G CD4+ T-CELLS BLD-ACNC: 0 IU/ML
M TB TUBERC IFN-G BLD QL: NEGATIVE
M TB TUBERC IGNF/MITOGEN IGNF CONTROL: >10 IU/ML
QFT TB1 AG MINUS NIL: 0.04 IU/ML
QFT TB2 AG MINUS NIL: 0.11 IU/ML

## 2024-01-25 PROCEDURE — 3075F SYST BP GE 130 - 139MM HG: CPT | Performed by: INTERNAL MEDICINE

## 2024-01-25 PROCEDURE — 99214 OFFICE O/P EST MOD 30 MIN: CPT | Performed by: INTERNAL MEDICINE

## 2024-01-25 PROCEDURE — 3080F DIAST BP >= 90 MM HG: CPT | Performed by: INTERNAL MEDICINE

## 2024-01-25 RX ORDER — TIRZEPATIDE 2.5 MG/.5ML
2.5 INJECTION, SOLUTION SUBCUTANEOUS WEEKLY
Qty: 2 ML | Refills: 0 | Status: SHIPPED | OUTPATIENT
Start: 2024-01-25

## 2024-01-25 RX ORDER — TIRZEPATIDE 2.5 MG/.5ML
2.5 INJECTION, SOLUTION SUBCUTANEOUS WEEKLY
Qty: 2 ML | Refills: 0 | Status: SHIPPED | OUTPATIENT
Start: 2024-01-25 | End: 2024-01-25

## 2024-01-25 RX ORDER — TIRZEPATIDE 5 MG/.5ML
5 INJECTION, SOLUTION SUBCUTANEOUS WEEKLY
Qty: 2 ML | Refills: 0 | Status: SHIPPED | OUTPATIENT
Start: 2024-01-25

## 2024-01-25 NOTE — PROGRESS NOTES
Name: Essence Borja  Date: 1/25/2024    Referring Physician: Ralf Nguyen    Chief Complaint   Patient presents with    Hypothyroidism     Patient following up to check on thyroid medication/labs.        HISTORY OF PRESENT ILLNESS   Essence Borja is a 28 year old female who presents for   Chief Complaint   Patient presents with    Hypothyroidism     Patient following up to check on thyroid medication/labs.      27 y/o F presents for follow up evaluation of hypothyroidism.  She was diagnosed at approximately 17-18 years old followed by primary care.  She was evaluated due to weight gain prior to finding thyroid diagnosis.  She was started on Levothyroxine therapy since that time.        She was then evaluated by \"wellness\" Physician a year ago who gave option of possible Hashimotos Thyroiditis.      She is currently maintained on Levothyroxine 137mcg PO daily, taking medication in AM and waiting 30-60 min before eating.     +significant weight gain approx 80lbs in the past few years.  +Cold intolerance. +fatigue, +lack of motivation. +night sweats. +somnolence. Hair loss stable.  No menstrual cycles secondary to Depo Injection.  No significant hirsutism.     Compression Symptoms:   Dysphagia: Occ  Dyspnea: No  Voice Change: No  Anterior Neck Pain: No     No family h/o thyroid disease     2/2023  Since last visit increase the dose of phentermine but weight is stable.  Normal energy level.  She is taking Levothyroxine 137mcg PO daily, taking medication in AM and waiting 30-60 min before eating.  Of note during visit she mentions that she has not had cycle in the past year since stopping Depo-Provera.      8/2023  Since last visit she did have a regular cycle x2.  Initially she had IUD placed but then removed 3 weeks ago. She is maintained on phentermine therapy and weight stable.  Wegovy was not covered by her insurance.     Last cycle 7/24/23; June 6/22-29; IUD removed 8/10.     1/2024  She recently saw PCP      REVIEW OF SYSTEMS  Eyes: no change in vision  Neurologic: no headache, generalized or focal weakness or numbness.  Head: normal  ENT: normal  Lungs: no shortness of breath, wheezing or MARTINEZ  Cardiovascular:  no chest pain or palpitations  Gastrointestinal:  no abdominal pain, bowel movement problems  Musculoskeletal: no muscle pain or arthralgia  /Gyne: no frequency or discomfort while urinating  Psychiatric:  no acute distress, anxiety  or depression  Skin: normal moisturized skin    Medications:     Current Outpatient Medications:     LEVOTHYROXINE 150 MCG Oral Tab, TAKE 1 TABLET(150 MCG) BY MOUTH DAILY, Disp: 90 tablet, Rfl: 0    SUMAtriptan Succinate 100 MG Oral Tab, TAKE AT ONSET; REPEAT ONCE AFTER 2 HOURS MAXIMUM DAILY DOSE IS 2 TABLETS, Disp: 9 tablet, Rfl: 2    escitalopram 10 MG Oral Tab, Take 1 tablet (10 mg total) by mouth daily., Disp: 30 tablet, Rfl: 1    Galcanezumab-gnlm (EMGALITY) 120 MG/ML Subcutaneous Solution Auto-injector, Inject 120 mg into the skin every 30 (thirty) days., Disp: 1 mL, Rfl: 2    rifAMPin 300 MG Oral Cap, Take 2 capsules (600 mg total) by mouth at bedtime., Disp: , Rfl:     Cyanocobalamin (VITAMIN B-12 ER) 2000 MCG Oral Tab CR, Take 1 tablet (2,000 mcg total) by mouth daily., Disp: , Rfl:     Fluocinonide 0.05 % External Solution, Use twice daily Monday-Friday with flares on scalp. Do not use on face., Disp: 60 mL, Rfl: 5    ketoconazole 2 % External Shampoo, Use 2-3 times weekly. Lather into scalp and leave on for 5 minutes before washing off., Disp: 120 mL, Rfl: 11    Tretinoin 0.05 % External Cream, Apply pea sized amount to the full face at night, making sure to avoid the eyes and lips. Wash off in the morning. Start using every other night and then progress to every night as tolerated. Apply moisturizer nightly to avoid excessive drying, Disp: 45 g, Rfl: 0    clindamycin 1 % External Lotion, Apply twice daily with flares of pimples on body, Disp: 60 mL, Rfl: 11     LEVOCETIRIZINE 5 MG Oral Tab, TAKE 1 TABLET(5 MG) BY MOUTH EVERY EVENING, Disp: 90 tablet, Rfl: 1    azelastine 0.1 % Nasal Solution, by Nasal route 2 (two) times daily., Disp: , Rfl:     Vitamin D3, Cholecalciferol, (VITAMIN D3) 125 MCG (5000 UT) Oral Cap, Take 1 capsule (5,000 Units total) by mouth daily., Disp: , Rfl:     MULTIPLE VITAMIN OR, Take 1 tablet by mouth daily., Disp: , Rfl:     Omeprazole 10 MG Oral Capsule Delayed Release, Take 1 capsule (10 mg total) by mouth as needed., Disp: , Rfl:      Allergies:   Allergies   Allergen Reactions    Doxycycline NAUSEA AND VOMITING    Hydrocodone NAUSEA AND VOMITING     Narco    Tree Nuts OTHER (SEE COMMENTS)     Abdominal cramps       Social History:   Social History     Socioeconomic History    Marital status: Single   Tobacco Use    Smoking status: Former     Packs/day: 0     Types: Cigarettes     Passive exposure: Never    Smokeless tobacco: Never    Tobacco comments:     a few cigarettes a day, now I use a vape   Vaping Use    Vaping Use: Every day    Substances: Nicotine, Flavoring, 5%    Devices: Disposable, Pre-filled or refillable cartridge   Substance and Sexual Activity    Alcohol use: Not Currently     Comment: only on occasion    Drug use: Never    Sexual activity: Yes     Partners: Male   Other Topics Concern    Blood Transfusions No    Caffeine Concern Yes     Comment: coffee and soda  1 glass daily    Exercise No    Grew up on a farm No    History of tanning No    Outdoor occupation No    Breast feeding No    Reaction to local anesthetic No    Pt has a pacemaker No    Pt has a defibrillator No       Medical History:   Past Medical History:   Diagnosis Date    Acne 2012    I was about 16 years old when it started. Got worse in college ~2018    Allergic rhinitis     since childhood    Anxiety     Chronic migraine     Contact allergic reaction     Environmental allergies     Childhood    Esophageal reflux 2020    had Colonoscopy    Hypothyroidism      Irritable bowel syndrome 2013    runs in family    Kidney stone     Latent tuberculosis by blood test     Obesity 2020    Scoliosis     slight scoliosis    TMJ (dislocation of temporomandibular joint)        Surgical history:   Past Surgical History:   Procedure Laterality Date    COLONOSCOPY  2021    OTHER SURGICAL HISTORY  2017    wisdom teeth    WISDOM TEETH REMOVED         PHYSICAL EXAMINATION:  /90   Pulse 98   Wt 226 lb (102.5 kg)   LMP 10/26/2023   BMI 37.61 kg/m²     General Appearance:  Alert, in no acute distress, well developed  Eyes: normal conjunctivae, sclera.  Ears/Nose/Mouth/Throat/Neck:  normal hearing, normal speech and diffusely enlarged thyroid gland  Musculoskeletal:  normal muscle strength and tone  PV: normal pulses of carotids, pedals  Skin:  normal moisture and skin texture  Hair & Nails:  normal scalp hair     Neuro:  sensory grossly intact and motor grossly intact  Psychiatric:  oriented to time, self, and place  Nutritional:  no abnormal weight gain or loss    ASSESSMENT/PLAN:    1. Hypothyroidism   - Discussed diagnosis with patient  - Discussed nonspecific symptoms  - Continue Levothyroxine 150mcg PO daily   - Normal TFTs - recheck TSH, FT4 in 2 months     2. Metabolic Syndrome  - Discussed diagnosis with patient  - No evidence of PCOS or Cushings Syndrome  -LIver US consistent with LAGUNAS  -Recheck CMP  -Unfortunately GLP-1 was not covered by insurance  -Phentermine discontinued   -Start Zepbound, verbalized understanding of risks and benefits     3. LAGUNAS  - stable     4. Secondary Amenorrhea  - Restarted Depo Injection     5. Insulin Resistance  - Strong family h/o DM2  - Signs of insulin resistance and glucose abnormality  - Will try to start GLP-1 as noted above     RTC  6 months     1/25/2024  Hamida Son MD

## 2024-01-25 NOTE — TELEPHONE ENCOUNTER
Skyrizi enrollment form, chart notes, and TB results faxed to both [facility] and [facility] on 1/25/24.  Awaiting determination.

## 2024-01-25 NOTE — PROGRESS NOTES
[facility] enrollment forms faxed to both [facility] and [facility] on 1/25/24.

## 2024-01-26 NOTE — TELEPHONE ENCOUNTER
From: Essence Borja  To: Hamida Son  Sent: 1/25/2024 5:58 PM CST  Subject: Zepbound    Hi,   I already got a message from "[employer] telling me that this medication is not covered by my insurance…   Just wanted to let you know.

## 2024-01-27 ENCOUNTER — TELEPHONE (OUTPATIENT)
Dept: ENDOCRINOLOGY CLINIC | Facility: CLINIC | Age: 29
End: 2024-01-27

## 2024-01-27 NOTE — TELEPHONE ENCOUNTER
Current Outpatient Medications   Medication Sig Dispense Refill    Tirzepatide-Weight Management (ZEPBOUND) 5 MG/0.5ML Subcutaneous Solution Auto-injector Inject 5 mg into the skin once a week. 2 mL 0     KEY:[id number]

## 2024-01-29 NOTE — TELEPHONE ENCOUNTER
Medication  CGM  pump supply Requested:     Tirzepatide-Weight Management (ZEPBOUND) 5 MG/0.5ML Subcutaneous Solution Auto-injector                                                              CoverMyMeds Used:    Key: [id number]     Sig: Inject 5 mg into the skin once a week.     DX Code: E66.9, Z68.35                                        Case Number/Pending Ref#:

## 2024-01-31 ENCOUNTER — TELEPHONE (OUTPATIENT)
Dept: FAMILY MEDICINE CLINIC | Facility: CLINIC | Age: 29
End: 2024-01-31

## 2024-02-01 NOTE — TELEPHONE ENCOUNTER
Medication  CGM  pump supply Requested:     Tirzepatide-Weight Management (ZEPBOUND) 5 MG/0.5ML Subcutaneous Solution Auto-injector      CoverMyMeds Used: Yes     Key: [id number]      Sig: Inject 5 mg into the skin once a week.      DX Code: E66.9, Z68.35                                        Case Number/Pending Ref#:       CMM submitted, LOV 1/25  Awaiting determination

## 2024-02-01 NOTE — TELEPHONE ENCOUNTER
There is refill on file at [facility], from rx written 1/5/2024.  Additional refills to be addressed at upcoming appt.  Future Appointments   Date Time Provider Department Center   3/1/2024  4:40 PM Ralf Nguyen APRN [facility]

## 2024-02-01 NOTE — TELEPHONE ENCOUNTER
Per notes from last office visit patient needs to follow up please assist with apt     Return in about 4 weeks (around 2/2/2024) for Lexapro eval.     ISAIAH Rascon, 1/5/2024, 11:01 AM               Instructions      Return in about 4 weeks (around 2/2/2024) for Lexapro eval.  After Visit Summary (Printed 1/5/2024)

## 2024-02-06 NOTE — TELEPHONE ENCOUNTER
Received fax from [employer] in regards to Zepbound 5MG/0.5ML. Medication has been denied because \" you must have tried and had poor response to two formulary alternative drugs\". Denial letter placed in providers folder for review and [employer] message sent to alpesh Rico # - [id number]

## 2024-02-07 RX ORDER — SEMAGLUTIDE 0.25 MG/.5ML
0.25 INJECTION, SOLUTION SUBCUTANEOUS WEEKLY
Qty: 4 EACH | Refills: 0 | Status: SHIPPED | OUTPATIENT
Start: 2024-02-07

## 2024-02-07 NOTE — TELEPHONE ENCOUNTER
See patient response.     Reviewed Zepbound denial letter: formulary alternatives are: Qsymia or Xenical capsules.

## 2024-02-09 NOTE — TELEPHONE ENCOUNTER
Dr Son- Spoke with [facility]. States Wegovy is backordered till mid February- they anticipate getting some next week. They also wanted to clarify quantity for script did you want pt to receive 4 pens or 4ml?

## 2024-02-10 NOTE — TELEPHONE ENCOUNTER
Called [facility] in [place] [phone] spoke to Bradly. Clarified that the Wegovy order is for 4 pens. Bradly again said this is on backorder.     [facility] sent to pt to inform.

## 2024-02-12 ENCOUNTER — NURSE ONLY (OUTPATIENT)
Dept: OBGYN CLINIC | Facility: CLINIC | Age: 29
End: 2024-02-12
Payer: COMMERCIAL

## 2024-02-12 DIAGNOSIS — Z30.42 ENCOUNTER FOR DEPO-PROVERA CONTRACEPTION: Primary | ICD-10-CM

## 2024-02-12 PROCEDURE — 96372 THER/PROPH/DIAG INJ SC/IM: CPT | Performed by: OBSTETRICS & GYNECOLOGY

## 2024-02-12 RX ORDER — MEDROXYPROGESTERONE ACETATE 150 MG/ML
150 INJECTION, SUSPENSION INTRAMUSCULAR ONCE
Status: COMPLETED | OUTPATIENT
Start: 2024-02-12 | End: 2024-02-12

## 2024-02-12 RX ADMIN — MEDROXYPROGESTERONE ACETATE 150 MG: 150 INJECTION, SUSPENSION INTRAMUSCULAR at 08:31:00

## 2024-02-13 ENCOUNTER — TELEPHONE (OUTPATIENT)
Dept: DERMATOLOGY CLINIC | Facility: CLINIC | Age: 29
End: 2024-02-13

## 2024-02-13 NOTE — TELEPHONE ENCOUNTER
Spoke with Ashly and she confirmed the PA is in process and [facility] is handling this.  Please disregard.

## 2024-02-29 ENCOUNTER — PATIENT MESSAGE (OUTPATIENT)
Dept: ENDOCRINOLOGY CLINIC | Facility: CLINIC | Age: 29
End: 2024-02-29

## 2024-03-01 PROBLEM — R00.0 RACING HEART BEAT: Status: ACTIVE | Noted: 2024-03-01

## 2024-03-01 PROBLEM — R03.0 ELEVATED BLOOD PRESSURE READING: Status: ACTIVE | Noted: 2024-03-01

## 2024-03-01 PROBLEM — K21.9 GASTROESOPHAGEAL REFLUX DISEASE: Status: ACTIVE | Noted: 2024-03-01

## 2024-03-05 NOTE — TELEPHONE ENCOUNTER
Received fax from [employer] in regards to Wegovy 0.25MG/0.5ML. Medication has been approved from 02/03/2024 to 03/04/2025. [facility] [employer] message sent to pt and approval letter sent to scanning.   Approval # - [id number]

## 2024-03-15 DIAGNOSIS — G43.109 MIGRAINE WITH AURA AND WITHOUT STATUS MIGRAINOSUS, NOT INTRACTABLE: ICD-10-CM

## 2024-03-15 NOTE — TELEPHONE ENCOUNTER
Medication: EMGALITY 120 MG/ML Subcutaneous Solution Auto-injector      Date of last refill: 11/22/2023 (#1 mL/2)  [place]     Last office visit: 01/15/2024  Due back to clinic per last office note:  Around 07/15/2024  Date next office visit scheduled:    Future Appointments   Date Time Provider Department Center   3/22/2024  4:40 PM Ralf Nguyen APRN [facility]   7/25/2024  4:45 PM Hamida Son MD [facility]           Last OV note recommendation:    Impression/Plan:       Encounter Diagnoses   Name Primary?    Migraine with aura and without status migrainosus, not intractable      B12 deficiency Yes    Anxiety      Chronic neck pain           Decreased concentration, sluggishness- likely related to B12 deficiency and topamax, improved with stopping topamax and starting B12 po supplement     B12 deficiency- continue B12 1000mcg daily, and recheck B12 level, on chronic Nexium level     Refractory migraines- main triggers of chronic neck and TMJ, anxiety  Continue Emgality, working well  Abortive take sumatriptan 100mg and 2 alleve, immediately at onset  Refractory to Elavil and topamax  Prn flexeril  Re-refer to TAB navigator for anxiety due to insurance change

## 2024-03-17 RX ORDER — GALCANEZUMAB 120 MG/ML
1 INJECTION, SOLUTION SUBCUTANEOUS
Qty: 1 ML | Refills: 2 | Status: SHIPPED | OUTPATIENT
Start: 2024-03-17

## 2024-03-18 DIAGNOSIS — E03.9 HYPOTHYROIDISM, UNSPECIFIED TYPE: ICD-10-CM

## 2024-03-18 RX ORDER — LEVOTHYROXINE SODIUM 0.15 MG/1
150 TABLET ORAL DAILY
Qty: 90 TABLET | Refills: 0 | Status: CANCELLED | OUTPATIENT
Start: 2024-03-18

## 2024-03-20 ENCOUNTER — LAB ENCOUNTER (OUTPATIENT)
Dept: LAB | Facility: HOSPITAL | Age: 29
End: 2024-03-20
Attending: INTERNAL MEDICINE
Payer: COMMERCIAL

## 2024-03-20 DIAGNOSIS — E06.3 HYPOTHYROIDISM DUE TO HASHIMOTO'S THYROIDITIS: ICD-10-CM

## 2024-03-20 DIAGNOSIS — E03.8 HYPOTHYROIDISM DUE TO HASHIMOTO'S THYROIDITIS: ICD-10-CM

## 2024-03-20 DIAGNOSIS — G43.109 MIGRAINE WITH AURA AND WITHOUT STATUS MIGRAINOSUS, NOT INTRACTABLE: ICD-10-CM

## 2024-03-20 DIAGNOSIS — E03.9 HYPOTHYROIDISM, UNSPECIFIED TYPE: ICD-10-CM

## 2024-03-20 LAB
T4 FREE SERPL-MCNC: 1.4 NG/DL (ref 0.8–1.7)
TSI SER-ACNC: 0.68 MIU/ML (ref 0.55–4.78)
VIT B12 SERPL-MCNC: 594 PG/ML (ref 211–911)

## 2024-03-20 PROCEDURE — 82607 VITAMIN B-12: CPT

## 2024-03-20 PROCEDURE — 84443 ASSAY THYROID STIM HORMONE: CPT

## 2024-03-20 PROCEDURE — 84439 ASSAY OF FREE THYROXINE: CPT

## 2024-03-20 PROCEDURE — 36415 COLL VENOUS BLD VENIPUNCTURE: CPT

## 2024-04-01 RX ORDER — SEMAGLUTIDE 0.25 MG/.5ML
0.25 INJECTION, SOLUTION SUBCUTANEOUS WEEKLY
Qty: 2 ML | Refills: 0 | Status: SHIPPED | OUTPATIENT
Start: 2024-04-01

## 2024-04-23 PROBLEM — R19.7 DIARRHEA: Status: ACTIVE | Noted: 2022-01-26

## 2024-04-25 ENCOUNTER — LAB ENCOUNTER (OUTPATIENT)
Dept: LAB | Age: 29
End: 2024-04-25
Payer: COMMERCIAL

## 2024-04-25 DIAGNOSIS — F41.9 ANXIETY: ICD-10-CM

## 2024-04-25 LAB — VIT D+METAB SERPL-MCNC: 44.5 NG/ML (ref 30–100)

## 2024-04-25 PROCEDURE — 82306 VITAMIN D 25 HYDROXY: CPT

## 2024-04-25 PROCEDURE — 36415 COLL VENOUS BLD VENIPUNCTURE: CPT

## 2024-04-30 ENCOUNTER — NURSE ONLY (OUTPATIENT)
Dept: OBGYN CLINIC | Facility: CLINIC | Age: 29
End: 2024-04-30
Payer: COMMERCIAL

## 2024-04-30 DIAGNOSIS — Z30.42 ENCOUNTER FOR DEPO-PROVERA CONTRACEPTION: Primary | ICD-10-CM

## 2024-04-30 PROCEDURE — 96372 THER/PROPH/DIAG INJ SC/IM: CPT | Performed by: OBSTETRICS & GYNECOLOGY

## 2024-04-30 RX ORDER — SEMAGLUTIDE 0.25 MG/.5ML
0.25 INJECTION, SOLUTION SUBCUTANEOUS WEEKLY
Qty: 2 ML | Refills: 0 | Status: SHIPPED | OUTPATIENT
Start: 2024-04-30

## 2024-04-30 RX ORDER — MEDROXYPROGESTERONE ACETATE 150 MG/ML
150 INJECTION, SUSPENSION INTRAMUSCULAR ONCE
Status: COMPLETED | OUTPATIENT
Start: 2024-04-30 | End: 2024-04-30

## 2024-04-30 RX ADMIN — MEDROXYPROGESTERONE ACETATE 150 MG: 150 INJECTION, SUSPENSION INTRAMUSCULAR at 12:21:00

## 2024-04-30 NOTE — TELEPHONE ENCOUNTER
Endocrine Refill protocol for oral and injectable diabetic medications    Protocol Criteria:    -Appointment with Endocrinology completed in the last 6 months or scheduled in the next 3 months    -A1c result <8.5% in the past 6 months      Verify the above has been completed or scheduled in the appropriate timeline. If so can send a 90 day supply with 1 refill.     Last completed office visit: 01/25/24  Next scheduled Follow up: 07/25/24  Last A1c result: Last A1c value was 5.9% done 1/5/2024.

## 2024-04-30 NOTE — TELEPHONE ENCOUNTER
Please review; protocol failed.    Requested Prescriptions   Pending Prescriptions Disp Refills    LEVOTHYROXINE 150 MCG Oral Tab [Pharmacy Med Name: LEVOTHYROXINE 0.150MG (150MCG) TAB] 90 tablet 0     Sig: TAKE 1 TABLET(150 MCG) BY MOUTH DAILY       Thyroid Medication Protocol Failed - 1/21/2024  8:04 AM        Failed - Last TSH value is normal     Lab Results   Component Value Date    TSH 8.420 (H) 01/05/2024                 Passed - TSH in past 12 months        Passed - In person appointment or virtual visit in the past 12 mos or appointment in next 3 mos     Recent Outpatient Visits              1 week ago B12 deficiency    [facility] ([place], [place])Elena Wolf DO    Office Visit    2 weeks ago Encounter for wellness examination in adult    [facility] Ralf Nguyen APRN    Office Visit    1 month ago Psoriasis vulgaris    [facility] Lyle Smith MD    Office Visit    2 months ago Family planning    [facility] Candi Maloney MD    Office Visit    2 months ago COVID-19    [facility] Mayela Rudd APRN    Office Visit          Future Appointments         Provider Department Appt Notes    Tomorrow Lyle Smith MD [facility] follow up    In 3 days Hamida Son MD [facility]     In 1 week Ralf Nguyen APRN [facility] Return in about 4 weeks (around 2/2/2024) for Lexapro eval.    In 3 weeks [facility] OB NURSE [facility] - OB[facility]GYN depo                     Subjective:      Patient ID: Britni Braden is a 56 y.o. female.    Chief Complaint: Pain of the Right Knee      HPI: Britni Braden is a 56 y.o. female who presents to clinic for constant right knee pain. Patient denies known MEGAN. The pain started around February and is becoming progressively worse.  Pain is located laterally. She reports that the pain is a 7/10 aching, throbbing, pulling-type pain today. Pain is 8-9/10 at its worst.  The pain is aggravated by walking, going up and down stairs, kneeling, rising after sitting, and standing for long periods of time.  Associated symptoms include stiffness, swelling, giving way, and gelling. Denies numbness and tingling, but states there is an occasional sharp, pinching sensation that radiates down to her great toe. The pain is affecting ADLs and limiting desired level of activity.  Patient reports right knee scope for meniscal tear approximately 7 years ago.     Previous treatments include ice, rest, muscle relaxers, ACE wrap, acetaminophen, NSAIDs, and activity modification which have provided minimal relief.     Occupation:  / .     Ambulating: unassisted  Diabetic:  No  Smoking:  She has never smoked.  History of DVT/PE: Negative    PAST MEDICAL HISTORY:    Past Medical History:   Diagnosis Date    Depression     Hypertension     Migraine headache     Right knee DJD     evaluated by orthopedics    Rotator cuff tear      PAST SURGICAL HISTORY:    Past Surgical History:   Procedure Laterality Date    arm surgery      right    COLONOSCOPY N/A 9/26/2022    Procedure: COLONOSCOPY;  Surgeon: Ginny Shabazz MD;  Location: [address]);  Service: Endoscopy;  Laterality: N/A;  Fully vaccinated. Constipation Prep.EC    HYSTERECTOMY  2000    TVH, ant repair (prolaspse) ovaries remain    KNEE SURGERY      right knee     FAMILY HISTORY:    Family History   Problem Relation Name Age of Onset    Hypertension Mother      Stroke Mother       Heart attack Mother      Thyroid disease Mother      Peripheral vascular disease Mother      Alzheimer's disease Father      Hypertension Sister      Thyroid disease Sister      Cirrhosis Brother      Parkinsonism Sister      Thyroid disease Sister      Hypertension Brother      Breast cancer Neg Hx      Cancer Neg Hx      Colon cancer Neg Hx      Diabetes Neg Hx      Eclampsia Neg Hx      Miscarriages / Stillbirths Neg Hx      Ovarian cancer Neg Hx       labor Neg Hx      Esophageal cancer Neg Hx       SOCIAL HISTORY:    Social History     Occupational History    Not on file   Tobacco Use    Smoking status: Never    Smokeless tobacco: Never   Substance and Sexual Activity    Alcohol use: No    Drug use: No    Sexual activity: Yes     Partners: Male     Birth control/protection: Surgical     Comment: Hysterectomy      MEDICATIONS:   Current Outpatient Medications:     acetaminophen (TYLENOL) 500 MG tablet, Take 500 mg by mouth as needed for Pain., Disp: , Rfl:     albuterol (VENTOLIN HFA) 90 mcg/actuation inhaler, Inhale 2 puffs into the lungs every 6 (six) hours as needed for Wheezing. Rescue, Disp: 18 g, Rfl: 0    atorvastatin (LIPITOR) 20 MG tablet, Take 1 tablet (20 mg total) by mouth once daily., Disp: 90 tablet, Rfl: 3    carvediloL (COREG) 3.125 MG tablet, Take 1 tablet (3.125 mg total) by mouth 2 (two) times daily with meals., Disp: 180 tablet, Rfl: 3    diclofenac (VOLTAREN) 50 MG EC tablet, Take 1 tablet (50 mg total) by mouth 2 (two) times daily., Disp: 15 tablet, Rfl: 1    ergocalciferol (ERGOCALCIFEROL) 50,000 unit Cap, One weekly for 12 weeks then 2,000 IU daily thereafter, Disp: 12 capsule, Rfl: 1    fluticasone propionate (FLONASE) 50 mcg/actuation nasal spray, 1 spray (50 mcg total) by Each Nostril route once daily., Disp: 16 g, Rfl: 0    ibuprofen (ADVIL,MOTRIN) 200 MG tablet, Take 200 mg by mouth as needed for Pain., Disp: , Rfl:     ibuprofen (ADVIL,MOTRIN) 800 MG tablet, Take 800 mg by  mouth every 8 (eight) hours as needed., Disp: , Rfl:     levocetirizine (XYZAL) 5 MG tablet, Take 1 tablet (5 mg total) by mouth every evening., Disp: 30 tablet, Rfl: 11    losartan-hydrochlorothiazide 100-25 mg (HYZAAR) 100-25 mg per tablet, Take 1 tablet by mouth once daily., Disp: 90 tablet, Rfl: 3    Current Facility-Administered Medications:     acetaminophen tablet 650 mg, 650 mg, Oral, Once PRN, Memo Thakur MD    diphenhydrAMINE injection 25 mg, 25 mg, Intravenous, Once PRN, Memo Thakur MD    EPINEPHrine (EPIPEN) 0.3 mg/0.3 mL pen injection 0.3 mg, 0.3 mg, Intramuscular, PRN, Memo Thakur MD    LIDOcaine HCl 2% oral solution 15 mL, 15 mL, Oral, 1 time in Clinic/HOD, Ibeth Schultz MD    methylPREDNISolone sodium succinate injection 40 mg, 40 mg, Intravenous, Once PRN, Memo Thakur MD    ondansetron disintegrating tablet 4 mg, 4 mg, Oral, Once PRN, Memo Thakur MD    ALLERGIES:   Review of patient's allergies indicates:   Allergen Reactions    Sulfa (sulfonamide antibiotics) Itching and Swelling       Review of Systems:  Constitution: Negative for chills, fever and night sweats.   HENT: Negative for congestion and headaches.    Eyes: Negative for blurred vision or vision loss.  Cardiovascular: Negative for chest pain and syncope.   Respiratory: Negative for cough and shortness of breath.    Endocrine: Negative for polydipsia, polyphagia and polyuria.   Hematologic/Lymphatic: Negative for bleeding problem. Does not bruise/bleed easily.   Skin: Negative for dry skin, itching and rash.   Musculoskeletal: See HPI.   Gastrointestinal: Negative for abdominal pain and bowel incontinence.   Genitourinary: Negative for bladder incontinence and nocturia.   Neurological: Negative for disturbances in coordination, loss of balance and seizures.   Psychiatric/Behavioral: Negative for depression. The patient does not have insomnia.    Allergic/Immunologic: Negative for hives and  persistent infections.          Objective:        There were no vitals filed for this visit.    PHYSICAL EXAM:  General: Alert & oriented x3, well-developed and well-nourished, in no acute distress, sitting comfortably in the exam room.  Skin: Warm and dry. Capillary refill less than 2 seconds.   Head: Normocephalic and atraumatic.   Eyes: Sclera appear normal.   Nose: No deformities seen.   Ears: No deformities seen.   Neck: No tracheal deviation present.   Pulmonary/Chest: Breathing unlabored.   Neurological: Alert and oriented to person, place, and time.   Psychiatric: Mood is pleasant and affect appropriate.     RIGHT KNEE        Body habitus is  overweight .         The patient walks with a limp.        Resisted SLR:  positive.         Hip irritability:  negative.         The skin over the knee has healed scars from previous knee scope.         Knee effusion:  mild         Tenderness:  lateral.        Range of Motion:  Flexion 100°, Extension 0°        Ligament exam:   MCL:  trace   Lachman:  intact              Posterior sag:  intact    LCL:  intact        Patellar apprehension:  negative.        Popliteal cyst:  negative.        Patellar crepitation:  absent.        Pulses DP present, PT present.        Motor:  normal 5/5 strength in all tested muscle groups.         Sensory:  normal.      Imaging:   X-Rays: 3 views of right knee dated 03/11/2024 , and independently reviewed, show: Mild degenerative changes including minimal spurring about the bilateral posterior patella.  No definite narrowing of the right or left medial or lateral tibiofemoral or patellofemoral articulations. No acute fractures or dislocations. No evidence of foreign bodies.         Assessment:       1. Primary osteoarthritis of right knee    2. Acute pain of right knee    3. Quadricep tightness        Plan:          I explained the nature of the problem to the patient.     I discussed at length with the patient all the different treatment  options available for her right knee including anti-inflammatories, acetaminophen, rest, ice/heat, physical therapy to include strengthening exercise, and corticosteroid injections. I explained the potential role of surgery in the treatment of this condition. The patient understands that if non-surgical measures do not adequately control symptoms, surgery will be considered in the future.     Medications:    Prescription for Naproxen 500 mg BID provided today for PRN pain management. Patient understands to take with food and/or OTC prilosec to decrease GI side effects. Advised patient to refrain from taking other anti-inflammatory medications while taking this medication, however she may alternate with acetaminophen as needed.  Prescription for Robaxin 500 mg TID provided today. Advised patient to take at night, if drowsiness does not occur, she may take TID.   Physical Therapy:  Ambulatory referral to physical therapy for knee ROM, stretching, strengthening, and conditioning. Please also work on IT band tightness and quad strengthening.   Procedures:  None today. Consider CSI if symptoms worsen.  DME:  Right hinged knee brace provided today for use with ambulation and activities.   Pain Management: Ice compress to the affected area 2-3x a day for 15-20 minutes as needed for pain management.      Follow-Up: 6-8 weeks with Lissa Hannah PA-C for follow-up.     All of the patient's questions were answered and the patient will contact us if they have any questions or concerns in the interim.    Lissa Hannah PA-C  [facility]  Orthopedic Surgery    Medical Dictation software was used during the dictation of portions or the entirety of this medical record.  Phonetic or grammatic errors may exist due to the use of this software. For clarification, refer to the author of the document.

## 2024-04-30 NOTE — PROGRESS NOTES
Pt here for nurse visit for Depo-Provera administration. 2 patient identifiers verified with pt. Depo Injection given in Right Upper Outer Gluteus.   Patient tolerated injection well no reaction at this time.   Depo calender given to pt. Pt aware to RTO 7/16/24-7/30/24 for next Depo Injection.       Pt verbalized understanding understanding and agrees with POC

## 2024-05-02 ENCOUNTER — PATIENT MESSAGE (OUTPATIENT)
Dept: OBGYN CLINIC | Facility: CLINIC | Age: 29
End: 2024-05-02

## 2024-05-02 ENCOUNTER — TELEPHONE (OUTPATIENT)
Dept: OBGYN CLINIC | Facility: CLINIC | Age: 29
End: 2024-05-02

## 2024-05-02 NOTE — TELEPHONE ENCOUNTER
RN spoke with pt and scheduled her for an appointment with Dr. Maloney on 5/14. RN told pt that if her symptoms worsened and should could not wait until her appointment, she should go to urgent care for evaluation. Pt verbalized understanding and agreed with plan of care.

## 2024-05-14 ENCOUNTER — OFFICE VISIT (OUTPATIENT)
Dept: OBGYN CLINIC | Facility: CLINIC | Age: 29
End: 2024-05-14

## 2024-05-14 VITALS
SYSTOLIC BLOOD PRESSURE: 124 MMHG | WEIGHT: 237.81 LBS | DIASTOLIC BLOOD PRESSURE: 74 MMHG | HEIGHT: 65 IN | BODY MASS INDEX: 39.62 KG/M2

## 2024-05-14 DIAGNOSIS — N39.3 STRESS INCONTINENCE OF URINE: ICD-10-CM

## 2024-05-14 DIAGNOSIS — N89.8 VAGINAL ODOR: Primary | ICD-10-CM

## 2024-05-14 LAB
APPEARANCE: CLEAR
BILIRUBIN: NEGATIVE
GLUCOSE (URINE DIPSTICK): NEGATIVE MG/DL
KETONES (URINE DIPSTICK): NEGATIVE MG/DL
LEUKOCYTES: NEGATIVE
MULTISTIX LOT#: ABNORMAL NUMERIC
NITRITE, URINE: NEGATIVE
PH, URINE: 5.5 (ref 4.5–8)
PROTEIN (URINE DIPSTICK): NEGATIVE MG/DL
SPECIFIC GRAVITY: >=1.03 (ref 1–1.03)
URINE-COLOR: YELLOW
UROBILINOGEN,SEMI-QN: 0.2 MG/DL (ref 0–1.9)

## 2024-05-14 PROCEDURE — 3008F BODY MASS INDEX DOCD: CPT | Performed by: OBSTETRICS & GYNECOLOGY

## 2024-05-14 PROCEDURE — 81514 NFCT DS BV&VAGINITIS DNA ALG: CPT | Performed by: OBSTETRICS & GYNECOLOGY

## 2024-05-14 PROCEDURE — 3078F DIAST BP <80 MM HG: CPT | Performed by: OBSTETRICS & GYNECOLOGY

## 2024-05-14 PROCEDURE — 99213 OFFICE O/P EST LOW 20 MIN: CPT | Performed by: OBSTETRICS & GYNECOLOGY

## 2024-05-14 PROCEDURE — 87086 URINE CULTURE/COLONY COUNT: CPT | Performed by: OBSTETRICS & GYNECOLOGY

## 2024-05-14 PROCEDURE — 3074F SYST BP LT 130 MM HG: CPT | Performed by: OBSTETRICS & GYNECOLOGY

## 2024-05-14 PROCEDURE — 81002 URINALYSIS NONAUTO W/O SCOPE: CPT | Performed by: OBSTETRICS & GYNECOLOGY

## 2024-05-14 NOTE — PROGRESS NOTES
RETURN GYN OFFICE VISIT  [facility]    CHIEF COMPLAINT:    Chief Complaint   Patient presents with    Vaginal Problem     C/o vaginal odor and urinary incontinence        HISTORY OF PRESENT ILLNESS:    LARA is a 28 year old female  here for 6 months has had some postvoid dribbling. Denies hematuria, dysuria. Last sexually active 2 months ago. Denies frequency. Has been doing Kegel exercises without improvement. Also occasionally feels incomplete voiding.    Currently on Depo Provera - no menses.    Has some odor vaginally - fishy to musty - for last few months. Denies vaginal.      Denies fever, chills, nausea or vomiting. Denies pelvic pain.    Last Pap Date: 2023 Result:  NILM      REVIEW OF SYSTEMS:   CONSTITUTIONAL:  negative for fevers, chills, sweats and fatigue  GASTROINTESTINAL:  negative for nausea, vomiting, blood in stool, constipation, diarrhea and abdominal pain  GENITOURINARY: negative for discharge     SKIN:  negative for  rash, skin lesion and pruritus  ENDOCRINE:  negative for acne, fatigue, weight gain and weight loss  BEHAVIOR/PSYCH:  negative for depressed mood, anhedonia and anxiety    CURRENT MEDICATIONS:      Current Outpatient Medications:     WEGOVY 0.25 MG/0.5ML Subcutaneous Solution Auto-injector, INJECT 0.5 ML (0.25 MG TOTAL) INTO THE SKIN ONCE A WEEK., Disp: 2 mL, Rfl: 0    hydrOXYzine 25 MG Oral Tab, Take 1 tablet (25 mg total) by mouth every 8 (eight) hours as needed for Anxiety., Disp: 30 tablet, Rfl: 0    levothyroxine 150 MCG Oral Tab, Take 1 tablet (150 mcg total) by mouth daily., Disp: 90 tablet, Rfl: 3    Galcanezumab-gnlm (EMGALITY) 120 MG/ML Subcutaneous Solution Auto-injector, Inject 1 mL into the skin every 30 (thirty) days., Disp: 1 mL, Rfl: 2    SUMAtriptan Succinate 100 MG Oral Tab, TAKE AT ONSET; REPEAT ONCE AFTER 2 HOURS MAXIMUM DAILY DOSE IS 2 TABLETS, Disp: 9 tablet, Rfl: 2    rifAMPin 300 MG Oral Cap, Take 2 capsules (600 mg total) by mouth at bedtime.,  Disp: , Rfl:     Cyanocobalamin (VITAMIN B-12 ER) 2000 MCG Oral Tab CR, Take 1 tablet (2,000 mcg total) by mouth daily., Disp: , Rfl:     Fluocinonide 0.05 % External Solution, Use twice daily Monday-Friday with flares on scalp. Do not use on face., Disp: 60 mL, Rfl: 5    ketoconazole 2 % External Shampoo, Use 2-3 times weekly. Lather into scalp and leave on for 5 minutes before washing off., Disp: 120 mL, Rfl: 11    Tretinoin 0.05 % External Cream, Apply pea sized amount to the full face at night, making sure to avoid the eyes and lips. Wash off in the morning. Start using every other night and then progress to every night as tolerated. Apply moisturizer nightly to avoid excessive drying, Disp: 45 g, Rfl: 0    clindamycin 1 % External Lotion, Apply twice daily with flares of pimples on body, Disp: 60 mL, Rfl: 11    LEVOCETIRIZINE 5 MG Oral Tab, TAKE 1 TABLET(5 MG) BY MOUTH EVERY EVENING, Disp: 90 tablet, Rfl: 1    azelastine 0.1 % Nasal Solution, by Nasal route 2 (two) times daily., Disp: , Rfl:     Vitamin D3, Cholecalciferol, (VITAMIN D3) 125 MCG (5000 UT) Oral Cap, Take 1 capsule (5,000 Units total) by mouth daily., Disp: , Rfl:     MULTIPLE VITAMIN OR, Take 1 tablet by mouth daily., Disp: , Rfl:     Omeprazole 10 MG Oral Capsule Delayed Release, Take 1 capsule (10 mg total) by mouth as needed., Disp: , Rfl:     escitalopram 10 MG Oral Tab, Take 2 tablets (20 mg total) by mouth daily. (Patient not taking: Reported on 4/25/2024), Disp: 30 tablet, Rfl: 1    PAST MEDICAL, SOCIAL AND FAMILY HISTORY:    Past Medical History:    Acne    I was about 16 years old when it started. Got worse in college ~2018    Allergic rhinitis    since childhood    Anxiety    Chronic migraine    Contact allergic reaction    Environmental allergies    Childhood    Esophageal reflux    had Colonoscopy    Hypothyroidism    Irritable bowel syndrome    runs in family    Kidney stone    Latent tuberculosis by blood test    Obesity     Scoliosis    slight scoliosis    TMJ (dislocation of temporomandibular joint)     Past Surgical History:   Procedure Laterality Date    Colonoscopy      Other surgical history  2017    wisdom teeth    Fred teeth removed       Family History   Problem Relation Age of Onset    Diabetes Mother         type 2    Obesity Mother     Heart Disorder Maternal Grandfather         congenital heart condition    Obesity Maternal Grandfather     Dementia Maternal Grandmother     Obesity Maternal Grandmother     Obesity Paternal Grandmother     Heart Disorder Sister          of heart valve disorder as infant     Social History     Socioeconomic History    Marital status: Single   Tobacco Use    Smoking status: Former     Types: Cigarettes     Passive exposure: Never    Smokeless tobacco: Never    Tobacco comments:     a few cigarettes a day, now I use a vape   Vaping Use    Vaping status: Every Day    Substances: Nicotine, Flavoring, 5%    Devices: Disposable, Pre-filled or refillable cartridge   Substance and Sexual Activity    Alcohol use: Not Currently     Comment: only on occasion    Drug use: Never    Sexual activity: Yes     Partners: Male   Other Topics Concern    Blood Transfusions No    Caffeine Concern Yes     Comment: coffee and soda  1 glass daily    Exercise No    Grew up on a farm No    History of tanning No    Outdoor occupation No    Breast feeding No    Reaction to local anesthetic No    Pt has a pacemaker No    Pt has a defibrillator No           PHYSICAL EXAM:   Patient's last menstrual period was 10/26/2023.; Body mass index is 39.57 kg/m².      CONSTITUTIONAL:  Awake, alert, cooperative, no apparent distress  EYES: sclera clear and conjunctiva normal  GASTROINTESTINAL:  normal bowel sounds, soft, non-distended, non-tender, no masses palpated  GENITAL/URINARY:    External Genitalia:  General appearance; normal, Hair distribution; normal, Lesions absent   Urethral Meatus:  Lesions absent, Prolapse  absent  Bladder:  Tenderness absent, Cystocele absent  Vagina:  Discharge slight yellow to white, Lesions absent, Pelvic support normal  Cervix:  Lesions absent, Discharge absent, Tenderness absent  Uterus:  Size normal, Masses absent, Tenderness absent  Adnexa:  Masses absent, Tenderness absent  Anus/Perineum:  Lesions absent  SKIN:  No rashes  PSYCH:  Affect Normal      ASSESSMENT AND PLAN:  1. Vaginal odor    - Urine Culture, Routine; Future  - URINALYSIS NONAUTO W/O SCOPE  - Vaginitis Vaginosis PCR Panel; Future  - Urine Culture, Routine    2. Stress incontinence of urine  Referred to UROGYN for further evaluation    - Urine Culture, Routine; Future  - URINALYSIS NONAUTO W/O SCOPE  - Urine Culture, Routine  - Urogynecology Referral - In Network    Follow up prn    Candi Maloney MD

## 2024-05-15 LAB
BV BACTERIA DNA VAG QL NAA+PROBE: NEGATIVE
C GLABRATA DNA VAG QL NAA+PROBE: NEGATIVE
C KRUSEI DNA VAG QL NAA+PROBE: NEGATIVE
CANDIDA DNA VAG QL NAA+PROBE: NEGATIVE
T VAGINALIS DNA VAG QL NAA+PROBE: NEGATIVE

## 2024-05-21 ENCOUNTER — PATIENT MESSAGE (OUTPATIENT)
Dept: ENDOCRINOLOGY CLINIC | Facility: CLINIC | Age: 29
End: 2024-05-21

## 2024-05-22 NOTE — TELEPHONE ENCOUNTER
Dr. Son,     Called pharmacy and was notified that pt needs to go up to the next dose. Wegovy 0.25 mg/0.5 ml is no longer covered. Would like to increase the dose?

## 2024-05-23 RX ORDER — SEMAGLUTIDE 0.5 MG/.5ML
0.5 INJECTION, SOLUTION SUBCUTANEOUS WEEKLY
Qty: 4 EACH | Refills: 0 | Status: SHIPPED | OUTPATIENT
Start: 2024-05-23 | End: 2024-05-23

## 2024-05-23 RX ORDER — SEMAGLUTIDE 0.5 MG/.5ML
0.5 INJECTION, SOLUTION SUBCUTANEOUS WEEKLY
Qty: 4 EACH | Refills: 0 | Status: SHIPPED | OUTPATIENT
Start: 2024-05-23

## 2024-05-24 NOTE — TELEPHONE ENCOUNTER
Dr. Son -- pt is asking if it's okay she increases her Wegovy still since she hasn't taken it in 4 weeks?

## 2024-06-13 DIAGNOSIS — G43.109 MIGRAINE WITH AURA AND WITHOUT STATUS MIGRAINOSUS, NOT INTRACTABLE: ICD-10-CM

## 2024-06-13 RX ORDER — GALCANEZUMAB 120 MG/ML
1 INJECTION, SOLUTION SUBCUTANEOUS
Qty: 1 ML | Refills: 11 | Status: SHIPPED | OUTPATIENT
Start: 2024-06-13

## 2024-06-13 NOTE — TELEPHONE ENCOUNTER
Medication:  EMGALITY 120 MG/ML Subcutaneous Solution Auto-injector      Date of last refill: 03/17/2024 (#1 mL/2)  [place]     Last office visit: 01/15/2024  Due back to clinic per last office note:  Around 07/15/2024  Date next office visit scheduled:    Future Appointments   Date Time Provider Department Center   7/17/2024  9:30 AM Love Tamayo DO [facility]   7/25/2024  4:45 PM Hamida Son MD [facility]           Last OV note recommendation:    Impression/Plan:       Encounter Diagnoses   Name Primary?    Migraine with aura and without status migrainosus, not intractable      B12 deficiency Yes    Anxiety      Chronic neck pain           Decreased concentration, sluggishness- likely related to B12 deficiency and topamax, improved with stopping topamax and starting B12 po supplement     B12 deficiency- continue B12 1000mcg daily, and recheck B12 level, on chronic Nexium level     Refractory migraines- main triggers of chronic neck and TMJ, anxiety  Continue Emgality, working well  Abortive take sumatriptan 100mg and 2 alleve, immediately at onset  Refractory to Elavil and topamax  Prn flexeril  Re-refer to TAB navigator for anxiety due to insurance change

## 2024-07-15 RX ORDER — SEMAGLUTIDE 0.5 MG/.5ML
0.5 INJECTION, SOLUTION SUBCUTANEOUS WEEKLY
Qty: 6 ML | Refills: 0 | Status: SHIPPED | OUTPATIENT
Start: 2024-07-15

## 2024-07-15 NOTE — TELEPHONE ENCOUNTER
Endocrine Refill protocol for oral medications    Protocol Criteria: PASSED      -Appointment with Endocrinology completed in the last 6 months or scheduled in the next 3 months     Verify the above has been completed or scheduled in the appropriate timeline. If so can send a 90 day supply with 1 refill.     Last completed office visit: 1/25/2024 Hamida Son MD   Next scheduled Follow up:   Future Appointments   Date Time Provider Department Center   7/17/2024  9:30 AM [facility]   7/25/2024  4:45 PM [facility]

## 2024-07-17 ENCOUNTER — OFFICE VISIT (OUTPATIENT)
Dept: UROLOGY | Facility: HOSPITAL | Age: 29
End: 2024-07-17
Attending: OBSTETRICS & GYNECOLOGY
Payer: COMMERCIAL

## 2024-07-17 ENCOUNTER — TELEPHONE (OUTPATIENT)
Dept: ENDOCRINOLOGY CLINIC | Facility: CLINIC | Age: 29
End: 2024-07-17

## 2024-07-17 VITALS — BODY MASS INDEX: 39.49 KG/M2 | HEIGHT: 65 IN | WEIGHT: 237 LBS | RESPIRATION RATE: 18 BRPM

## 2024-07-17 DIAGNOSIS — N81.84 PELVIC MUSCLE WASTING: ICD-10-CM

## 2024-07-17 DIAGNOSIS — N39.41 URGE INCONTINENCE: ICD-10-CM

## 2024-07-17 DIAGNOSIS — E66.9 CLASS 2 OBESITY WITHOUT SERIOUS COMORBIDITY WITH BODY MASS INDEX (BMI) OF 35.0 TO 35.9 IN ADULT, UNSPECIFIED OBESITY TYPE: Primary | ICD-10-CM

## 2024-07-17 DIAGNOSIS — N39.3 FEMALE STRESS INCONTINENCE: ICD-10-CM

## 2024-07-17 DIAGNOSIS — N39.43 POST-VOID DRIBBLING: Primary | ICD-10-CM

## 2024-07-17 LAB
BILIRUB UR QL: NEGATIVE
COLOR UR: YELLOW
CONTROL RUN WITHIN 24 HOURS?: YES
GLUCOSE UR-MCNC: NORMAL MG/DL
HGB UR QL STRIP.AUTO: NEGATIVE
KETONES UR-MCNC: NEGATIVE MG/DL
LEUKOCYTE ESTERASE UR QL STRIP.AUTO: NEGATIVE
LEUKOCYTE ESTERASE URINE: NEGATIVE
NITRITE UR QL STRIP.AUTO: NEGATIVE
NITRITE URINE: NEGATIVE
PH UR: 5.5 [PH] (ref 5–8)
SP GR UR STRIP: >1.03 (ref 1–1.03)
UROBILINOGEN UR STRIP-ACNC: NORMAL

## 2024-07-17 PROCEDURE — 81001 URINALYSIS AUTO W/SCOPE: CPT | Performed by: OBSTETRICS & GYNECOLOGY

## 2024-07-17 PROCEDURE — 99212 OFFICE O/P EST SF 10 MIN: CPT

## 2024-07-17 PROCEDURE — 81002 URINALYSIS NONAUTO W/O SCOPE: CPT | Performed by: OBSTETRICS & GYNECOLOGY

## 2024-07-17 PROCEDURE — 87086 URINE CULTURE/COLONY COUNT: CPT | Performed by: OBSTETRICS & GYNECOLOGY

## 2024-07-17 NOTE — TELEPHONE ENCOUNTER
Wegovkrys PA is approved through 3/4/2025. Dr. Son, ok to increase Wegovy dose? Currently on 0.5mg Wegovy dose.

## 2024-07-17 NOTE — PROGRESS NOTES
Love Tamayo, DO  2024     Referred by Dr. Maloney  Pt here with self    Chief Complaint   Patient presents with    Incontinence     Post void dribbling       HPI:  +CARMEN  Some UUI  Nocturia x0-1  No prolapse  No dyspareunia  Constipated bowels (daliy fiber gummies)    PRIOR TREATMENTS:    Kegels    No  UTIs  No gross hematuria        Vitals:  Resp 18   Ht 65\"   Wt 237 lb (107.5 kg)   LMP 10/26/2023   BMI 39.44 kg/m²      HISTORY:  Past Medical History:    Acne    I was about 16 years old when it started. Got worse in college ~2018    Allergic rhinitis    since childhood    Anxiety    Chronic migraine    Contact allergic reaction    Environmental allergies    Childhood    Esophageal reflux    had Colonoscopy    Hypothyroidism    Irritable bowel syndrome    runs in family    Kidney stone    Latent tuberculosis by blood test    Obesity    Scoliosis    slight scoliosis    TMJ (dislocation of temporomandibular joint)      Past Surgical History:   Procedure Laterality Date    Colonoscopy      Other surgical history      wisdom teeth    [place] teeth removed        Family History   Problem Relation Age of Onset    Diabetes Mother         type 2    Obesity Mother     Heart Disorder Maternal Grandfather         congenital heart condition    Obesity Maternal Grandfather     Dementia Maternal Grandmother     Obesity Maternal Grandmother     Obesity Paternal Grandmother     Heart Disorder Sister          of heart valve disorder as infant      Social History     Socioeconomic History    Marital status: Single   Tobacco Use    Smoking status: Former     Types: Cigarettes     Passive exposure: Never    Smokeless tobacco: Never    Tobacco comments:     a few cigarettes a day, now I use a vape   Vaping Use    Vaping status: Every Day    Substances: Nicotine, Flavoring, 5%    Devices: Disposable, Pre-filled or refillable cartridge   Substance and Sexual Activity    Alcohol use: Not Currently     Comment:  only on occasion    Drug use: Never    Sexual activity: Yes     Partners: Male   Other Topics Concern    Blood Transfusions No    Caffeine Concern Yes     Comment: coffee and soda  1 glass daily    Exercise No    Grew up on a farm No    History of tanning No    Outdoor occupation No    Breast feeding No    Reaction to local anesthetic No    Pt has a pacemaker No    Pt has a defibrillator No     Social Determinants of Health      Received from [facility]        Allergies:  Allergies   Allergen Reactions    Doxycycline NAUSEA AND VOMITING    Hydrocodone NAUSEA AND VOMITING     Narco    Tree Nuts OTHER (SEE COMMENTS)     Abdominal cramps       Medications:  Outpatient Medications Prior to Visit   Medication Sig Dispense Refill    WEGOVY 0.5 MG/0.5ML Subcutaneous Solution Auto-injector Inject 0.5 mL (0.5 mg total) into the skin once a week. 6 mL 0    Galcanezumab-gnlm (EMGALITY) 120 MG/ML Subcutaneous Solution Auto-injector ADMINISTER 1 ML UNDER THE SKIN EVERY 30 DAYS 1 mL 11    levothyroxine 150 MCG Oral Tab Take 1 tablet (150 mcg total) by mouth daily. 90 tablet 3    Cyanocobalamin (VITAMIN B-12 ER) 2000 MCG Oral Tab CR Take 1 tablet (2,000 mcg total) by mouth daily.      LEVOCETIRIZINE 5 MG Oral Tab TAKE 1 TABLET(5 MG) BY MOUTH EVERY EVENING 90 tablet 1    Vitamin D3, Cholecalciferol, (VITAMIN D3) 125 MCG (5000 UT) Oral Cap Take 1 capsule (5,000 Units total) by mouth daily.      MULTIPLE VITAMIN OR Take 1 tablet by mouth daily.      Omeprazole 10 MG Oral Capsule Delayed Release Take 1 capsule (10 mg total) by mouth as needed.      WEGOVY 0.25 MG/0.5ML Subcutaneous Solution Auto-injector INJECT 0.5 ML (0.25 MG TOTAL) INTO THE SKIN ONCE A WEEK. (Patient not taking: Reported on 7/17/2024) 2 mL 0    hydrOXYzine 25 MG Oral Tab Take 1 tablet (25 mg total) by mouth every 8 (eight) hours as needed for Anxiety. (Patient not taking: Reported on 7/17/2024) 30 tablet 0    escitalopram 10 MG Oral Tab Take 2  tablets (20 mg total) by mouth daily. (Patient not taking: Reported on 4/25/2024) 30 tablet 1    SUMAtriptan Succinate 100 MG Oral Tab TAKE AT ONSET; REPEAT ONCE AFTER 2 HOURS MAXIMUM DAILY DOSE IS 2 TABLETS (Patient not taking: Reported on 7/17/2024) 9 tablet 2    rifAMPin 300 MG Oral Cap Take 2 capsules (600 mg total) by mouth at bedtime. (Patient not taking: Reported on 7/17/2024)      Fluocinonide 0.05 % External Solution Use twice daily Monday-Friday with flares on scalp. Do not use on face. 60 mL 5    ketoconazole 2 % External Shampoo Use 2-3 times weekly. Lather into scalp and leave on for 5 minutes before washing off. 120 mL 11    Tretinoin 0.05 % External Cream Apply pea sized amount to the full face at night, making sure to avoid the eyes and lips. Wash off in the morning. Start using every other night and then progress to every night as tolerated. Apply moisturizer nightly to avoid excessive drying (Patient not taking: Reported on 7/17/2024) 45 g 0    clindamycin 1 % External Lotion Apply twice daily with flares of pimples on body (Patient not taking: Reported on 7/17/2024) 60 mL 11    azelastine 0.1 % Nasal Solution by Nasal route 2 (two) times daily. (Patient not taking: Reported on 7/17/2024)       No facility-administered medications prior to visit.       Urogynecology Summary:  Urogynecology Summary  Prolapse: No  CARMEN: Yes (sneezing)  Urge Incontinence: Yes  Nocturia Frequency:  (0-1)  Frequency: 1 - 2 hours  Incomplete emptying: Yes  Constipation: Yes (bowel regimen reviewed and handout given)  Wears pad day?: 0  Wears Pad Night?: 0  Activities are limited by UI/POP?: No  Currently Sexually Active: Yes    Review of Systems:    A comprehensive 12 point review of systems was completed.  Pertinent positives noted in the the HPI.  No CP  No SOB    GENERAL EXAM:  GENERAL:  Alert and Oriented, and NAD  HEENT:  Normal, no lesions  LUNGS:  Normal effort  HEART:  RRR  ABDOMEN: soft, no mass, no  hernia  EXTREM:  Normal, no edema  SKIN:  Normal, no lesions    PELVIC EXAM:  Ext. Gen: no atrophy, no lesions  Urethra: no atrophy, nontender  Bladder:some fullness, nontender  Vagina: no atrophy  Cervix: no bleeding, no lesions, nontender  Uterus: +mobile  Adnexa:no masses, nontender  Perineum: nontender  Anus: wnl  Rectum: defer    PELVIS FLOOR NEUROMUSCULAR FUNCTION:  Strength:  1, Unable to hold greater than 3 sec, Increased tone, and Unable to relax  Perineal Sensation:  Normal      PELVIC SUPPORT:  [place]:  0  Ant:  0  Post:  0  CST:  negative  UVJ: not hypermobile    Pt examined with chaperone, RN    Impression/Plan:    ICD-10-CM    1. Post-void dribbling  N39.43 PHYSICAL THERAPY - External Location      2. Urge incontinence  N39.41 PHYSICAL THERAPY - External Location     POCT urinalysis dipstick[09101]     Urinalysis, Routine     Urine Culture, Routine      3. Female stress incontinence  N39.3 PHYSICAL THERAPY - External Location      4. Pelvic muscle wasting  N81.84 PHYSICAL THERAPY - External Location          Discussion Items:   Urodynamics and cystoscopy for evaluation of LUTS  Behavioral and pharmacologic treatments for OAB  Pelvic muscle rehabilitation including pelvic floor PT  Bowel routine/constipation regimen  Discussed dietary and behavioral modification, discussed pharmacologic and nonpharmacologic mgmt options for urinary symptoms. Discussed dietary & weight management with potential improvements in symptoms with weight loss.    Discussed dietary and behavioral modifications for mgmt of urinary symptoms  Discussed weight management and benefits of weight loss on urinary symptoms  Reviewed AUA/SUFU guidelines on mgmt of non-neurogenic OAB  Discussed pharmacologic and nonpharmacologic mgmt options of urinary symptoms - reviewed risks, benefits, alternatives, and goals of treatment  Discussed specific risks related to OAB meds including, but not limited to dry mouth, constipation, blurry vision,  cognitive changes, and BP elevation.    Bowel management reviewed. Discussed using fiber daily w/ addition of miralax as needed      Diagnostic Items:  Urine testin    Medications Discussed:  Fiber  Miralax  OAB meds    Treatment Plan, Non-surgical:   RN teaching/pt education done  Fiber supplement  Stimulant:  MOM, Miralax  PFPT    Treatment Plan, Surgical:   None    Pt verbalizes understanding of all above discussed information. All questions answered. She agrees to plan    Return in about 3 months (around 10/17/2024) for PT f/u.    Love Tamayo DO, FACOG, FACS      Discussion undertaken in English, info provided      The 21st Century Cures Act makes medical notes like these available to patients in the interest of transparency. However, be advised this is a medical document. It is intended as peer to peer communication. It is written in medical language and may contain abbreviations or verbiage that are unfamiliar. It may appear blunt or direct. Medical documents are intended to carry relevant information, facts as evident, and the clinical opinion of the practitioner.

## 2024-07-17 NOTE — TELEPHONE ENCOUNTER
Current Outpatient Medications   Medication Sig Dispense Refill    WEGOVY 0.5 MG/0.5ML Subcutaneous Solution Auto-injector Inject 0.5 mL (0.5 mg total) into the skin once a week. 6 mL 0            KEY:[id number]

## 2024-07-18 RX ORDER — SEMAGLUTIDE 1 MG/.5ML
1 INJECTION, SOLUTION SUBCUTANEOUS WEEKLY
Qty: 4 EACH | Refills: 0 | Status: SHIPPED | OUTPATIENT
Start: 2024-07-18

## 2024-07-19 ENCOUNTER — TELEPHONE (OUTPATIENT)
Dept: UROLOGY | Facility: HOSPITAL | Age: 29
End: 2024-07-19

## 2024-07-19 NOTE — TELEPHONE ENCOUNTER
Outgoing telephone call to patient  The urine culture obtained on 7/17/24 showed no growth, however, the urinalysis obtained on 7/17/24 showed RBCs 3-5.  Discussed with Renetta Adams PA-C.  [facility]AB office cysto.   CT abd/pelvis from 4/28/23 no hydro, no stones. No need to repeat imaging at this time.  Patient understands and agrees to plan.   Encouraged to call clinic with questions or concerns.   will call pt to schedule cysto.

## 2024-07-25 ENCOUNTER — OFFICE VISIT (OUTPATIENT)
Dept: ENDOCRINOLOGY CLINIC | Facility: CLINIC | Age: 29
End: 2024-07-25
Payer: COMMERCIAL

## 2024-07-25 VITALS
SYSTOLIC BLOOD PRESSURE: 122 MMHG | HEART RATE: 103 BPM | WEIGHT: 232 LBS | BODY MASS INDEX: 38.65 KG/M2 | HEIGHT: 65 IN | DIASTOLIC BLOOD PRESSURE: 86 MMHG

## 2024-07-25 DIAGNOSIS — E06.3 HYPOTHYROIDISM DUE TO HASHIMOTO'S THYROIDITIS: Primary | ICD-10-CM

## 2024-07-25 DIAGNOSIS — N91.1 SECONDARY AMENORRHEA: ICD-10-CM

## 2024-07-25 DIAGNOSIS — E66.9 CLASS 2 OBESITY WITHOUT SERIOUS COMORBIDITY WITH BODY MASS INDEX (BMI) OF 35.0 TO 35.9 IN ADULT, UNSPECIFIED OBESITY TYPE: ICD-10-CM

## 2024-07-25 DIAGNOSIS — E88.810 METABOLIC SYNDROME: ICD-10-CM

## 2024-07-25 PROCEDURE — 3074F SYST BP LT 130 MM HG: CPT | Performed by: INTERNAL MEDICINE

## 2024-07-25 PROCEDURE — 3008F BODY MASS INDEX DOCD: CPT | Performed by: INTERNAL MEDICINE

## 2024-07-25 PROCEDURE — 3079F DIAST BP 80-89 MM HG: CPT | Performed by: INTERNAL MEDICINE

## 2024-07-25 PROCEDURE — 99214 OFFICE O/P EST MOD 30 MIN: CPT | Performed by: INTERNAL MEDICINE

## 2024-07-25 RX ORDER — SEMAGLUTIDE 1.7 MG/.75ML
1.7 INJECTION, SOLUTION SUBCUTANEOUS WEEKLY
Qty: 4 EACH | Refills: 0 | Status: SHIPPED | OUTPATIENT
Start: 2024-08-22

## 2024-07-25 RX ORDER — SEMAGLUTIDE 2.4 MG/.75ML
2.4 INJECTION, SOLUTION SUBCUTANEOUS WEEKLY
Qty: 4 EACH | Refills: 0 | Status: SHIPPED | OUTPATIENT
Start: 2024-09-19

## 2024-07-25 RX ORDER — SEMAGLUTIDE 1 MG/.5ML
1 INJECTION, SOLUTION SUBCUTANEOUS WEEKLY
Qty: 4 EACH | Refills: 0 | Status: SHIPPED | OUTPATIENT
Start: 2024-07-25

## 2024-07-25 NOTE — PROGRESS NOTES
Name: Essence Borja  Date: 7/25/2024    Referring Physician: Ralf Nguyen    Chief Complaint   Patient presents with    Hypothyroidism     Pt is in for a Thyroid follow up       HISTORY OF PRESENT ILLNESS   Essence Borja is a 28 year old female who presents for   Chief Complaint   Patient presents with    Hypothyroidism     Pt is in for a Thyroid follow up     29 y/o F presents for follow up evaluation of hypothyroidism.  She was diagnosed at approximately 17-18 years old followed by primary care.  She was evaluated due to weight gain prior to finding thyroid diagnosis.  She was started on Levothyroxine therapy since that time.        She was then evaluated by \"wellness\" Physician a year ago who gave option of possible Hashimotos Thyroiditis.      She is currently maintained on Levothyroxine 137mcg PO daily, taking medication in AM and waiting 30-60 min before eating.     +significant weight gain approx 80lbs in the past few years.  +Cold intolerance. +fatigue, +lack of motivation. +night sweats. +somnolence. Hair loss stable.  No menstrual cycles secondary to Depo Injection.  No significant hirsutism.     Compression Symptoms:   Dysphagia: Occ  Dyspnea: No  Voice Change: No  Anterior Neck Pain: No     No family h/o thyroid disease     2/2023  Since last visit increase the dose of phentermine but weight is stable.  Normal energy level.  She is taking Levothyroxine 137mcg PO daily, taking medication in AM and waiting 30-60 min before eating.  Of note during visit she mentions that she has not had cycle in the past year since stopping Depo-Provera.      8/2023  Since last visit she did have a regular cycle x2.  Initially she had IUD placed but then removed 3 weeks ago. She is maintained on phentermine therapy and weight stable.  Wegovy was not covered by her insurance.     Last cycle 7/24/23; June 6/22-29; IUD removed 8/10.     7/2024  Since last visits she has been started on Wegovy 0.5mg subcutaneous weekly.  She  is tolerating well and lost approximately 5lbs.  She notes improvement in diet.  No cycles secondary to Depo Provera.     REVIEW OF SYSTEMS  Eyes: no change in vision  Neurologic: no headache, generalized or focal weakness or numbness.  Head: normal  ENT: normal  Lungs: no shortness of breath, wheezing or MARTINEZ  Cardiovascular:  no chest pain or palpitations  Gastrointestinal:  no abdominal pain, bowel movement problems  Musculoskeletal: no muscle pain or arthralgia  /Gyne: no frequency or discomfort while urinating  Psychiatric:  no acute distress, anxiety  or depression  Skin: normal moisturized skin    Medications:     Current Outpatient Medications:     semaglutide-weight management (WEGOVY) 1 MG/0.5ML Subcutaneous Solution Auto-injector, Inject 0.5 mL (1 mg total) into the skin once a week., Disp: 4 each, Rfl: 0    levothyroxine 150 MCG Oral Tab, Take 1 tablet (150 mcg total) by mouth daily., Disp: 90 tablet, Rfl: 3    WEGOVY 0.5 MG/0.5ML Subcutaneous Solution Auto-injector, Inject 0.5 mL (0.5 mg total) into the skin once a week., Disp: 6 mL, Rfl: 0    Galcanezumab-gnlm (EMGALITY) 120 MG/ML Subcutaneous Solution Auto-injector, ADMINISTER 1 ML UNDER THE SKIN EVERY 30 DAYS, Disp: 1 mL, Rfl: 11    WEGOVY 0.25 MG/0.5ML Subcutaneous Solution Auto-injector, INJECT 0.5 ML (0.25 MG TOTAL) INTO THE SKIN ONCE A WEEK. (Patient not taking: Reported on 7/17/2024), Disp: 2 mL, Rfl: 0    hydrOXYzine 25 MG Oral Tab, Take 1 tablet (25 mg total) by mouth every 8 (eight) hours as needed for Anxiety. (Patient not taking: Reported on 7/17/2024), Disp: 30 tablet, Rfl: 0    escitalopram 10 MG Oral Tab, Take 2 tablets (20 mg total) by mouth daily. (Patient not taking: Reported on 4/25/2024), Disp: 30 tablet, Rfl: 1    SUMAtriptan Succinate 100 MG Oral Tab, TAKE AT ONSET; REPEAT ONCE AFTER 2 HOURS MAXIMUM DAILY DOSE IS 2 TABLETS (Patient not taking: Reported on 7/17/2024), Disp: 9 tablet, Rfl: 2    rifAMPin 300 MG Oral Cap, Take 2  capsules (600 mg total) by mouth at bedtime. (Patient not taking: Reported on 7/17/2024), Disp: , Rfl:     Cyanocobalamin (VITAMIN B-12 ER) 2000 MCG Oral Tab CR, Take 1 tablet (2,000 mcg total) by mouth daily., Disp: , Rfl:     Fluocinonide 0.05 % External Solution, Use twice daily Monday-Friday with flares on scalp. Do not use on face., Disp: 60 mL, Rfl: 5    ketoconazole 2 % External Shampoo, Use 2-3 times weekly. Lather into scalp and leave on for 5 minutes before washing off., Disp: 120 mL, Rfl: 11    Tretinoin 0.05 % External Cream, Apply pea sized amount to the full face at night, making sure to avoid the eyes and lips. Wash off in the morning. Start using every other night and then progress to every night as tolerated. Apply moisturizer nightly to avoid excessive drying (Patient not taking: Reported on 7/17/2024), Disp: 45 g, Rfl: 0    clindamycin 1 % External Lotion, Apply twice daily with flares of pimples on body (Patient not taking: Reported on 7/17/2024), Disp: 60 mL, Rfl: 11    LEVOCETIRIZINE 5 MG Oral Tab, TAKE 1 TABLET(5 MG) BY MOUTH EVERY EVENING, Disp: 90 tablet, Rfl: 1    azelastine 0.1 % Nasal Solution, by Nasal route 2 (two) times daily. (Patient not taking: Reported on 7/17/2024), Disp: , Rfl:     Vitamin D3, Cholecalciferol, (VITAMIN D3) 125 MCG (5000 UT) Oral Cap, Take 1 capsule (5,000 Units total) by mouth daily., Disp: , Rfl:     MULTIPLE VITAMIN OR, Take 1 tablet by mouth daily., Disp: , Rfl:     Omeprazole 10 MG Oral Capsule Delayed Release, Take 1 capsule (10 mg total) by mouth as needed., Disp: , Rfl:      Allergies:   Allergies   Allergen Reactions    Doxycycline NAUSEA AND VOMITING    Hydrocodone NAUSEA AND VOMITING     Narco    Tree Nuts OTHER (SEE COMMENTS)     Abdominal cramps       Social History:   Social History     Socioeconomic History    Marital status: Single   Tobacco Use    Smoking status: Former     Types: Cigarettes     Passive exposure: Never    Smokeless tobacco: Never     Tobacco comments:     a few cigarettes a day, now I use a vape   Vaping Use    Vaping status: Every Day    Substances: Nicotine, Flavoring, 5%    Devices: Disposable, Pre-filled or refillable cartridge   Substance and Sexual Activity    Alcohol use: Not Currently     Comment: only on occasion    Drug use: Never    Sexual activity: Yes     Partners: Male   Other Topics Concern    Blood Transfusions No    Caffeine Concern Yes     Comment: coffee and soda  1 glass daily    Exercise No    Grew up on a farm No    History of tanning No    Outdoor occupation No    Breast feeding No    Reaction to local anesthetic No    Pt has a pacemaker No    Pt has a defibrillator No       Medical History:   Past Medical History:    Acne    I was about 16 years old when it started. Got worse in college ~2018    Allergic rhinitis    since childhood    Anxiety    Chronic migraine    Contact allergic reaction    Environmental allergies    Childhood    Esophageal reflux    had Colonoscopy    Hypothyroidism    Irritable bowel syndrome    runs in family    Kidney stone    Latent tuberculosis by blood test    Obesity    Scoliosis    slight scoliosis    TMJ (dislocation of temporomandibular joint)       Surgical history:   Past Surgical History:   Procedure Laterality Date    Colonoscopy  2021    Other surgical history  2017    wisdom teeth    [place] teeth removed         PHYSICAL EXAMINATION:  /86   Pulse 103   Ht 5' 5\" (1.651 m)   Wt 232 lb (105.2 kg)   LMP 10/26/2023   BMI 38.61 kg/m²     General Appearance:  Alert, in no acute distress, well developed  Eyes: normal conjunctivae, sclera.  Ears/Nose/Mouth/Throat/Neck:  normal hearing, normal speech and diffusely enlarged thyroid gland  Musculoskeletal:  normal muscle strength and tone  PV: normal pulses of carotids, pedals  Skin:  normal moisture and skin texture  Hair & Nails:  normal scalp hair     Neuro:  sensory grossly intact and motor grossly intact  Psychiatric:  oriented  to time, self, and place  Nutritional:  no abnormal weight gain or loss    ASSESSMENT/PLAN:    1. Hypothyroidism   - Discussed diagnosis with patient  - Discussed nonspecific symptoms  - Continue Levothyroxine 150mcg PO daily   - Normal TFTs     2. Metabolic Syndrome  - Discussed diagnosis with patient  - No evidence of PCOS or Cushings Syndrome  -LIver US consistent with LAGUNAS  -Recheck CMP  -Increase Wegovy to 1mg subcutaneous weekly for a month then increase to 1.7mg subcutaneous weekly for one month then increase to 2.4mg subcutaneous weekly     3. LAGUNAS  - stable     4. Secondary Amenorrhea  - Restarted Depo Injection     5. Insulin Resistance  - Strong family h/o DM2  - Signs of insulin resistance and glucose abnormality  - Will try to start GLP-1 as noted above     RTC  6 months     7/25/2024  Hamida Son MD

## 2024-08-12 ENCOUNTER — HOSPITAL ENCOUNTER (OUTPATIENT)
Age: 29
Discharge: HOME | End: 2024-08-12
Attending: EMERGENCY MEDICINE
Payer: COMMERCIAL

## 2024-08-12 ENCOUNTER — TELEPHONE (OUTPATIENT)
Dept: NEUROLOGY | Facility: CLINIC | Age: 29
End: 2024-08-12

## 2024-08-12 VITALS
SYSTOLIC BLOOD PRESSURE: 135 MMHG | TEMPERATURE: 98 F | HEART RATE: 122 BPM | DIASTOLIC BLOOD PRESSURE: 65 MMHG | RESPIRATION RATE: 20 BRPM | OXYGEN SATURATION: 98 %

## 2024-08-12 DIAGNOSIS — U07.1 COVID-19: Primary | ICD-10-CM

## 2024-08-12 LAB — SARS-COV-2 RNA RESP QL NAA+PROBE: DETECTED

## 2024-08-12 PROCEDURE — 99212 OFFICE O/P EST SF 10 MIN: CPT

## 2024-08-12 PROCEDURE — 99213 OFFICE O/P EST LOW 20 MIN: CPT

## 2024-08-12 NOTE — TELEPHONE ENCOUNTER
Received PA request from [employer] for Emgality 120mg/ml. PA initiated through Cover My Meds Key [id number].

## 2024-08-12 NOTE — ED INITIAL ASSESSMENT (HPI)
Bilateral ear pain, throat pain for 5 days, cough and sinus congestion since yesterday, + body aches and chills, no documented fever

## 2024-08-12 NOTE — ED PROVIDER NOTES
Patient Seen in: [facility]      History     Chief Complaint   Patient presents with    Cough/URI     Stated Complaint: Cold - Pain in throat/ears, minor stuffy nose, tired, chills/overheated,    Subjective:     28-year-old female presents today for evaluation of sore throat congestion cough is been going on for the past few days.  Body aches and chills.  Bilateral ear pain.  No fever.  No chest pain or shortness of breath.    Objective:   Past Medical History:    Acne    I was about 16 years old when it started. Got worse in college ~2018    Allergic rhinitis    since childhood    Anxiety    Chronic migraine    Contact allergic reaction    Environmental allergies    Childhood    Esophageal reflux    had Colonoscopy    Hypothyroidism    Irritable bowel syndrome    runs in family    Kidney stone    Latent tuberculosis by blood test    Obesity    Scoliosis    slight scoliosis    TMJ (dislocation of temporomandibular joint)              Past Surgical History:   Procedure Laterality Date    Colonoscopy  2021    Other surgical history  2017    wisdom teeth    [place] teeth removed                  Social History     Socioeconomic History    Marital status: Single   Tobacco Use    Smoking status: Former     Types: Cigarettes     Passive exposure: Never    Smokeless tobacco: Never    Tobacco comments:     a few cigarettes a day, now I use a vape   Vaping Use    Vaping status: Every Day    Substances: Nicotine, Flavoring, 5%    Devices: Disposable, Pre-filled or refillable cartridge   Substance and Sexual Activity    Alcohol use: Not Currently     Comment: only on occasion    Drug use: Never    Sexual activity: Yes     Partners: Male   Other Topics Concern    Blood Transfusions No    Caffeine Concern Yes     Comment: coffee and soda  1 glass daily    Exercise No    Grew up on a farm No    History of tanning No    Outdoor occupation No    Breast feeding No    Reaction to local anesthetic No    Pt has a pacemaker  No    Pt has a defibrillator No     Social Determinants of Health      Received from [facility]                Physical Exam     ED Triage Vitals [08/12/24 1608]   /65   Pulse (!) 122   Resp 20   Temp 98 °F (36.7 °C)   Temp src Oral   SpO2 98 %   O2 Device None (Room air)       Current:/65   Pulse (!) 122   Temp 98 °F (36.7 °C) (Oral)   Resp 20   LMP 10/26/2023   SpO2 98%         Physical Exam  Vitals reviewed.   Constitutional:       General: She is not in acute distress.     Appearance: Normal appearance. She is not ill-appearing or toxic-appearing.   HENT:      Head: Normocephalic and atraumatic.      Right Ear: Tympanic membrane normal.      Left Ear: Tympanic membrane normal.      Mouth/Throat:      Mouth: Mucous membranes are moist.      Pharynx: Oropharynx is clear. No pharyngeal swelling, oropharyngeal exudate or posterior oropharyngeal erythema.   Eyes:      Extraocular Movements: Extraocular movements intact.      Conjunctiva/sclera: Conjunctivae normal.   Cardiovascular:      Rate and Rhythm: Regular rhythm. Tachycardia present.   Pulmonary:      Effort: Pulmonary effort is normal.      Breath sounds: Normal breath sounds.   Musculoskeletal:      Cervical back: Neck supple.   Skin:     General: Skin is warm and dry.   Neurological:      Mental Status: She is alert and oriented to person, place, and time.   Psychiatric:         Mood and Affect: Mood normal.         ED Course     Labs Reviewed   RAPID SARS-COV-2 BY PCR - Abnormal; Notable for the following components:       Result Value    Rapid SARS-CoV-2 by PCR Detected (*)     All other components within normal limits     Imaging:  No results found.              MDM        28 year old female with viral symptoms.  Will check swabs.    Differential diagnosis (including but not limited to):  Flu, COVID, other viral syndrome    ED course:  Pulse Ox: 98% on room air which is normal      Comment: Please note this report has  been produced using speech recognition software and may contain errors related to that system including errors in grammar, punctuation, and spelling, as well as words and phrases that may be inappropriate. If there are any questions or concerns please feel free to contact the dictating provider for clarification.                                     Medical Decision Making      Disposition and Plan     Clinical Impression:  1. COVID-19         Disposition:  Discharge  8/12/2024  4:30 pm    Follow-up:  Ruby Pedro MD  [address] [place] [id number]  [phone]    Schedule an appointment as soon as possible for a visit             Medications Prescribed:  Current Discharge Medication List                                 Marcellus Szymanski MD  8/12/2024  4:20 PM

## 2024-08-14 NOTE — TELEPHONE ENCOUNTER
Received Emgality denial stating patient cannot be on another CGRP while on Emgality.   Per review of prior authorization answers on cover my meds, question was answered that patient is on another which is incorrect.     Spoke with [facility] prior authorization representative (Jose) who states the first page of denial letter along with correctly answered question and office visit notes to be faxed to [phone] for reconsideration.  Above faxed for reconsideration with fax confirmation received.

## 2024-08-15 NOTE — TELEPHONE ENCOUNTER
Received prior authorization approval for Emgality 120mg/ml from [employer] 7/16/24-8/15/25. Faxed to [facility] with confirmation. MCM sent to patient.

## 2024-08-26 ENCOUNTER — TELEPHONE (OUTPATIENT)
Dept: OBGYN CLINIC | Facility: CLINIC | Age: 29
End: 2024-08-26

## 2024-08-26 NOTE — TELEPHONE ENCOUNTER
This patient is requesting a nurse visit at [facility] for a Depo Shot. She would like a callback to schedule appt. I tried reaching out with no success to open up schedule.

## 2024-08-28 ENCOUNTER — NURSE ONLY (OUTPATIENT)
Dept: OBGYN CLINIC | Facility: CLINIC | Age: 29
End: 2024-08-28
Payer: COMMERCIAL

## 2024-08-28 DIAGNOSIS — Z30.42 ENCOUNTER FOR DEPO-PROVERA CONTRACEPTION: Primary | ICD-10-CM

## 2024-08-28 PROCEDURE — 96372 THER/PROPH/DIAG INJ SC/IM: CPT | Performed by: OBSTETRICS & GYNECOLOGY

## 2024-08-28 RX ORDER — MEDROXYPROGESTERONE ACETATE 150 MG/ML
150 INJECTION, SUSPENSION INTRAMUSCULAR ONCE
Status: COMPLETED | OUTPATIENT
Start: 2024-08-28 | End: 2024-08-28

## 2024-08-28 RX ADMIN — MEDROXYPROGESTERONE ACETATE 150 MG: 150 INJECTION, SUSPENSION INTRAMUSCULAR at 11:50:00

## 2024-08-28 NOTE — PROGRESS NOTES
Patient here for nurse visit for Depo-Provera administration. Two patient identifiers verified with patient.     Last Depo:  04/30  Due for next Depo 11/13-11/27  Last Pap 4/11/023 due 4/11/2026      Depo Injection given in right upper outer gluteus   Patient tolerated injection well no reaction at this time.   Depo calender given to pt. Pt aware to return to office 11/13-11/27 for next Depo Injection.       Patient verbalized understanding and agrees with plan of care

## 2024-09-05 ENCOUNTER — ORDER TRANSCRIPTION (OUTPATIENT)
Dept: PHYSICAL THERAPY | Facility: HOSPITAL | Age: 29
End: 2024-09-05

## 2024-09-05 DIAGNOSIS — N81.84 PELVIC MUSCLE WASTING: ICD-10-CM

## 2024-09-05 DIAGNOSIS — N39.43 POST-VOID DRIBBLING: Primary | ICD-10-CM

## 2024-09-05 DIAGNOSIS — N39.3 FEMALE STRESS INCONTINENCE: ICD-10-CM

## 2024-09-05 DIAGNOSIS — N39.41 URGE INCONTINENCE: ICD-10-CM

## 2024-09-06 ENCOUNTER — OFFICE VISIT (OUTPATIENT)
Dept: FAMILY MEDICINE CLINIC | Facility: CLINIC | Age: 29
End: 2024-09-06
Payer: COMMERCIAL

## 2024-09-06 ENCOUNTER — OFFICE VISIT (OUTPATIENT)
Dept: UROLOGY | Facility: HOSPITAL | Age: 29
End: 2024-09-06
Attending: OBSTETRICS & GYNECOLOGY
Payer: COMMERCIAL

## 2024-09-06 VITALS
HEIGHT: 65 IN | WEIGHT: 226.63 LBS | SYSTOLIC BLOOD PRESSURE: 115 MMHG | BODY MASS INDEX: 37.76 KG/M2 | HEART RATE: 105 BPM | OXYGEN SATURATION: 98 % | DIASTOLIC BLOOD PRESSURE: 77 MMHG

## 2024-09-06 VITALS — RESPIRATION RATE: 18 BRPM | WEIGHT: 232 LBS | HEIGHT: 65 IN | BODY MASS INDEX: 38.65 KG/M2

## 2024-09-06 DIAGNOSIS — K58.2 IRRITABLE BOWEL SYNDROME WITH BOTH CONSTIPATION AND DIARRHEA: ICD-10-CM

## 2024-09-06 DIAGNOSIS — R12 HEARTBURN: Primary | ICD-10-CM

## 2024-09-06 DIAGNOSIS — R31.29 MICROSCOPIC HEMATURIA: Primary | ICD-10-CM

## 2024-09-06 LAB
CONTROL RUN WITHIN 24 HOURS?: YES
LEUKOCYTE ESTERASE URINE: NEGATIVE
NITRITE URINE: NEGATIVE

## 2024-09-06 PROCEDURE — 3074F SYST BP LT 130 MM HG: CPT

## 2024-09-06 PROCEDURE — 3078F DIAST BP <80 MM HG: CPT

## 2024-09-06 PROCEDURE — 88108 CYTOPATH CONCENTRATE TECH: CPT | Performed by: OBSTETRICS & GYNECOLOGY

## 2024-09-06 PROCEDURE — 52000 CYSTOURETHROSCOPY: CPT

## 2024-09-06 PROCEDURE — 3008F BODY MASS INDEX DOCD: CPT

## 2024-09-06 PROCEDURE — 81002 URINALYSIS NONAUTO W/O SCOPE: CPT | Performed by: OBSTETRICS & GYNECOLOGY

## 2024-09-06 PROCEDURE — 99213 OFFICE O/P EST LOW 20 MIN: CPT

## 2024-09-06 PROCEDURE — 99212 OFFICE O/P EST SF 10 MIN: CPT

## 2024-09-06 RX ORDER — FAMOTIDINE 20 MG/1
20 TABLET, FILM COATED ORAL 2 TIMES DAILY PRN
Qty: 40 TABLET | Refills: 1 | Status: SHIPPED | OUTPATIENT
Start: 2024-09-06

## 2024-09-06 RX ORDER — LIDOCAINE HYDROCHLORIDE 20 MG/ML
10 JELLY TOPICAL ONCE
Status: COMPLETED | OUTPATIENT
Start: 2024-09-06 | End: 2024-09-06

## 2024-09-06 RX ADMIN — LIDOCAINE HYDROCHLORIDE 10 ML: 20 JELLY TOPICAL at 12:15:00

## 2024-09-06 NOTE — PROGRESS NOTES
Subjective:   Essence Borja is a 28 year old female who presents for Abdominal Pain and Stomach Pain (Bowel issues, started gagging recently, acid reflux, taken Nexium, Pepto & Tums helps here n there  )   Patient is a pleasant 28-year-old female with past medical history significant for acne, IR, anxiety, migraines, hypothyroidism, IBS, obesity, scoliosis, and TMJ. Patient presents to office today for evaluation of stomach issues and frequent gagging. Patient states horrible stomach issues her whole life. Saw GI in the past but did not explain or help her. She is losing weight on wegovy, has lost 12 lbs in 5-6 months. She is aware the reflux and heartburn is likely from the medication but she would like to continue to take med. She is on omeprazole 10 daily. Changed to pepcid 20 mg bid. Would like to follow up with new GI to discuss chronic GI issues. Referral placed to [facility]. Educated on wegovy holiday as well. Educated on IBS and reflux. Educated on diverticulosis. Has been on SSRI in past, educated these can help with IBS as well.     Recent increase wegovy to 1.7 on 8/22/24        Past Medical History:    Acne    I was about 16 years old when it started. Got worse in college ~2018    Allergic rhinitis    since childhood    Anxiety    Chronic migraine    Contact allergic reaction    Environmental allergies    Childhood    Esophageal reflux    had Colonoscopy    Hypothyroidism    Irritable bowel syndrome    runs in family    Kidney stone    Latent tuberculosis by blood test    Obesity    Scoliosis    slight scoliosis    TMJ (dislocation of temporomandibular joint)      Past Surgical History:   Procedure Laterality Date    Colonoscopy  2021    Other surgical history  2017    wisdom teeth    [place] teeth removed          History/Other:    Chief Complaint Reviewed and Verified  Nursing Notes Reviewed and   Verified  Tobacco Reviewed  Allergies Reviewed  Medications Reviewed    Medical History Reviewed   Surgical History Reviewed  Family History   Reviewed  Social History Reviewed         Tobacco:  Social History     Tobacco Use   Smoking Status Former    Types: Cigarettes    Passive exposure: Never   Smokeless Tobacco Never   Tobacco Comments    a few cigarettes a day, now I use a vape     E-Cigarettes/Vaping       Questions Responses    E-Cigarette Use Current Every Day User    Cartridges/Day vaping          E-Cigarette/Vaping Substances       Questions Responses    Nicotine Yes    THC No    CBD No    Flavoring Yes    Other 5%          E-Cigarette/Vaping Devices       Questions Responses    Disposable Yes    Pre-filled or Refillable Cartridge Yes    Refillable Tank No    Pre-filled Pod No           Tobacco cessation counseling for <3 minutes.  She smoked tobacco in the past but quit greater than 12 months ago.  Social History     Tobacco Use   Smoking Status Former    Types: Cigarettes    Passive exposure: Never   Smokeless Tobacco Never   Tobacco Comments    a few cigarettes a day, now I use a vape          Current Outpatient Medications   Medication Sig Dispense Refill    famotidine 20 MG Oral Tab Take 1 tablet (20 mg total) by mouth 2 (two) times daily as needed for Heartburn. 40 tablet 1    semaglutide-weight management (WEGOVY) 1 MG/0.5ML Subcutaneous Solution Auto-injector Inject 0.5 mL (1 mg total) into the skin once a week. 4 each 0    semaglutide-weight management (WEGOVY) 1.7 MG/0.75ML Subcutaneous Solution Auto-injector Inject 0.75 mL (1.7 mg total) into the skin once a week. 4 each 0    [START ON 9/19/2024] semaglutide-weight management (WEGOVY) 2.4 MG/0.75ML Subcutaneous Solution Auto-injector Inject 0.75 mL (2.4 mg total) into the skin once a week. 4 each 0    Galcanezumab-gnlm (EMGALITY) 120 MG/ML Subcutaneous Solution Auto-injector ADMINISTER 1 ML UNDER THE SKIN EVERY 30 DAYS 1 mL 11    levothyroxine 150 MCG Oral Tab Take 1 tablet (150 mcg total) by mouth daily. 90 tablet 3    Cyanocobalamin  (VITAMIN B-12 ER) 2000 MCG Oral Tab CR Take 1 tablet (2,000 mcg total) by mouth daily.      Fluocinonide 0.05 % External Solution Use twice daily Monday-Friday with flares on scalp. Do not use on face. 60 mL 5    ketoconazole 2 % External Shampoo Use 2-3 times weekly. Lather into scalp and leave on for 5 minutes before washing off. 120 mL 11    LEVOCETIRIZINE 5 MG Oral Tab TAKE 1 TABLET(5 MG) BY MOUTH EVERY EVENING 90 tablet 1    Vitamin D3, Cholecalciferol, (VITAMIN D3) 125 MCG (5000 UT) Oral Cap Take 1 capsule (5,000 Units total) by mouth daily.      MULTIPLE VITAMIN OR Take 1 tablet by mouth daily.      hydrOXYzine 25 MG Oral Tab Take 1 tablet (25 mg total) by mouth every 8 (eight) hours as needed for Anxiety. (Patient not taking: Reported on 7/17/2024) 30 tablet 0    escitalopram 10 MG Oral Tab Take 2 tablets (20 mg total) by mouth daily. (Patient not taking: Reported on 4/25/2024) 30 tablet 1    SUMAtriptan Succinate 100 MG Oral Tab TAKE AT ONSET; REPEAT ONCE AFTER 2 HOURS MAXIMUM DAILY DOSE IS 2 TABLETS (Patient not taking: Reported on 7/17/2024) 9 tablet 2    rifAMPin 300 MG Oral Cap Take 2 capsules (600 mg total) by mouth at bedtime. (Patient not taking: Reported on 7/17/2024)      Tretinoin 0.05 % External Cream Apply pea sized amount to the full face at night, making sure to avoid the eyes and lips. Wash off in the morning. Start using every other night and then progress to every night as tolerated. Apply moisturizer nightly to avoid excessive drying (Patient not taking: Reported on 7/17/2024) 45 g 0    clindamycin 1 % External Lotion Apply twice daily with flares of pimples on body (Patient not taking: Reported on 7/17/2024) 60 mL 11    azelastine 0.1 % Nasal Solution by Nasal route 2 (two) times daily. (Patient not taking: Reported on 7/17/2024)           Review of Systems:  Review of Systems   Constitutional: Negative.  Negative for activity change and appetite change.   HENT: Negative.  Negative for  congestion, postnasal drip, rhinorrhea, sore throat, tinnitus and voice change.    Eyes: Negative.    Respiratory: Negative.  Negative for apnea, cough, chest tightness and shortness of breath.    Cardiovascular:  Negative for chest pain and leg swelling.   Gastrointestinal:  Positive for abdominal distention, constipation, diarrhea and nausea. Negative for abdominal pain, anal bleeding and blood in stool.   Genitourinary: Negative.  Negative for difficulty urinating, flank pain and menstrual problem.   Musculoskeletal: Negative.  Negative for joint swelling.   Skin: Negative.    Neurological: Negative.  Negative for dizziness and headaches.   Psychiatric/Behavioral: Negative.  Negative for agitation.          Objective:   /77 (BP Location: Left arm, Patient Position: Sitting, Cuff Size: large)   Pulse 105   Ht 5' 5\" (1.651 m)   Wt 226 lb 9.6 oz (102.8 kg)   LMP 10/26/2023   SpO2 98%   BMI 37.71 kg/m²  Estimated body mass index is 37.71 kg/m² as calculated from the following:    Height as of this encounter: 5' 5\" (1.651 m).    Weight as of this encounter: 226 lb 9.6 oz (102.8 kg).  Physical Exam  Vitals and nursing note reviewed.   Constitutional:       General: She is not in acute distress.     Appearance: Normal appearance. She is well-developed. She is obese.   HENT:      Head: Normocephalic and atraumatic.      Right Ear: External ear normal.      Left Ear: External ear normal.      Nose: Nose normal.   Neck:      Thyroid: No thyromegaly.   Cardiovascular:      Rate and Rhythm: Normal rate and regular rhythm.      Pulses: Normal pulses.      Heart sounds: Normal heart sounds.   Pulmonary:      Effort: Pulmonary effort is normal. No respiratory distress.      Breath sounds: Normal breath sounds.   Abdominal:      General: Bowel sounds are normal. There is no distension.      Palpations: Abdomen is soft. There is no mass.      Tenderness: There is no abdominal tenderness. There is no right CVA  tenderness, left CVA tenderness, guarding or rebound.      Hernia: No hernia is present.   Musculoskeletal:      Cervical back: Normal range of motion and neck supple.   Lymphadenopathy:      Cervical: No cervical adenopathy.   Skin:     General: Skin is warm and dry.      Capillary Refill: Capillary refill takes less than 2 seconds.   Neurological:      Mental Status: She is alert and oriented to person, place, and time.      Coordination: Coordination normal.   Psychiatric:         Behavior: Behavior normal.         Thought Content: Thought content normal.         Judgment: Judgment normal.           Assessment & Plan:   1. Heartburn (Primary)  -     Cancel: Gastro Referral - In Network  -     Famotidine; Take 1 tablet (20 mg total) by mouth 2 (two) times daily as needed for Heartburn.  Dispense: 40 tablet; Refill: 1  -     Gastro Referral - In Network  2. Irritable bowel syndrome with both constipation and diarrhea  -     Cancel: Gastro Referral - In Network  -     Famotidine; Take 1 tablet (20 mg total) by mouth 2 (two) times daily as needed for Heartburn.  Dispense: 40 tablet; Refill: 1  -     Gastro Referral - In Network      Constipation and diarrhea on wegovy with heartburn  DC omeprazole  Start pepcid 20 Mg bid prn  GI referral as requested  Red flags reviewed  Patient aware of plan of care. All questions answered to satisfaction of the patient. Patient instructed to call office or reach out via [employer] if any issues arise. For urgent issues and/or reviewed red flags please proceed to the urgent care or ER.  Also, inform the nurse practitioner with any new symptoms or medication side effects.        Return if symptoms worsen or fail to improve.    Ralf Nguyen, ISAIAH, 9/6/2024, 12:39 PM

## 2024-09-06 NOTE — PROGRESS NOTES
[facility] at  [facility]OLOGY  [address]  [facility] [address]  PH: [phone]  FAX: [phone]     Date Patient MRN   2024 Essence Borja   [address]  [facility] [address]  [id number]      Dr. Love Tamayo, DO     Patient Name:  Essence Borja   Patient : 1995 Patient Age: 28 year old   Referring Physician:  No ref. provider found    Vitals   Resp 18   Ht 65\"   Wt 232 lb (105.2 kg)   LMP 10/26/2023   BMI 38.61 kg/m²       Medications   Outpatient Encounter Medications as of 2024   Medication Sig Dispense Refill    semaglutide-weight management (WEGOVY) 1 MG/0.5ML Subcutaneous Solution Auto-injector Inject 0.5 mL (1 mg total) into the skin once a week. 4 each 0    semaglutide-weight management (WEGOVY) 1.7 MG/0.75ML Subcutaneous Solution Auto-injector Inject 0.75 mL (1.7 mg total) into the skin once a week. 4 each 0    [START ON 2024] semaglutide-weight management (WEGOVY) 2.4 MG/0.75ML Subcutaneous Solution Auto-injector Inject 0.75 mL (2.4 mg total) into the skin once a week. 4 each 0    Galcanezumab-gnlm (EMGALITY) 120 MG/ML Subcutaneous Solution Auto-injector ADMINISTER 1 ML UNDER THE SKIN EVERY 30 DAYS 1 mL 11    hydrOXYzine 25 MG Oral Tab Take 1 tablet (25 mg total) by mouth every 8 (eight) hours as needed for Anxiety. (Patient not taking: Reported on 2024) 30 tablet 0    levothyroxine 150 MCG Oral Tab Take 1 tablet (150 mcg total) by mouth daily. 90 tablet 3    escitalopram 10 MG Oral Tab Take 2 tablets (20 mg total) by mouth daily. (Patient not taking: Reported on 2024) 30 tablet 1    SUMAtriptan Succinate 100 MG Oral Tab TAKE AT ONSET; REPEAT ONCE AFTER 2 HOURS MAXIMUM DAILY DOSE IS 2 TABLETS (Patient not taking: Reported on 2024) 9 tablet 2    rifAMPin 300 MG Oral Cap Take 2 capsules (600 mg total) by mouth at bedtime. (Patient not taking: Reported on 2024)       Cyanocobalamin (VITAMIN B-12 ER) 2000 MCG Oral Tab CR Take 1 tablet (2,000 mcg total) by mouth daily.      Fluocinonide 0.05 % External Solution Use twice daily Monday-Friday with flares on scalp. Do not use on face. 60 mL 5    ketoconazole 2 % External Shampoo Use 2-3 times weekly. Lather into scalp and leave on for 5 minutes before washing off. 120 mL 11    Tretinoin 0.05 % External Cream Apply pea sized amount to the full face at night, making sure to avoid the eyes and lips. Wash off in the morning. Start using every other night and then progress to every night as tolerated. Apply moisturizer nightly to avoid excessive drying (Patient not taking: Reported on 2024) 45 g 0    clindamycin 1 % External Lotion Apply twice daily with flares of pimples on body (Patient not taking: Reported on 2024) 60 mL 11    LEVOCETIRIZINE 5 MG Oral Tab TAKE 1 TABLET(5 MG) BY MOUTH EVERY EVENING 90 tablet 1    azelastine 0.1 % Nasal Solution by Nasal route 2 (two) times daily. (Patient not taking: Reported on 2024)      Vitamin D3, Cholecalciferol, (VITAMIN D3) 125 MCG (5000 UT) Oral Cap Take 1 capsule (5,000 Units total) by mouth daily.      MULTIPLE VITAMIN OR Take 1 tablet by mouth daily.      Omeprazole 10 MG Oral Capsule Delayed Release Take 1 capsule (10 mg total) by mouth as needed.      [] lidocaine (Urojet) 2 % urethral jelly 10 mL        No facility-administered encounter medications on file as of 2024.         Allergies   Allergies were reviewed with positive findings listed below:  Doxycycline, Hydrocodone, and Tree nuts      Chief Complaint   Chief Complaint   Patient presents with    Cystourethroscopy Procedure        URINALYSIS:  Urine Color   Date Value Ref Range Status   2023 Yellow Yellow Final     Urine Clarity   Date Value Ref Range Status   2023 Clear Clear Final     Leukocyte esterase urine   Date Value Ref Range Status   2024 Negative Negative Final     Nitrite Urine    Date Value Ref Range Status   09/06/2024 Negative Negative Final     Blood Urine   Date Value Ref Range Status   09/06/2024 Small (A) Negative Final         Procedure        PRE-OP DIAGNOSIS: micro hematuria    POST-OP DIAGNOSIS: same    ANESTHESIA: 2% Lidocaine gel    PROCEDURES:  After sterile prep with Betadine the urethral lumen was prepped via catheter injection of approx. 3 mL 2% Lidocaine gel.  The urethra was gently dilated to accommodate the cystoscope.  Infusion of sterile water via the cystoscope was performed to achieve a comfortable bladder distension to allow a full view.  A careful, systemic assessment of the complete lumen of the bladder and urethra was performed.    SPECIMENS: urine cytology    DESCRIPTION Bladder Trigone UV  Junction URETHRA       Proximal Mild Distal   Negative/NL x x x x x x   Squamous Metaplasia         Polyps         Diverticulum         Other (exudate, cysts)           Bladder Trabeculation: none      Micro hematuria  CT scan abd/pelvic, no stone, no hydro, no mass  Cysto, d/w pt  Cytology pending, follow results    PT as scheduled, f/u after PT      Electronically signed by Love Tamayo DO

## 2024-09-09 ENCOUNTER — TELEPHONE (OUTPATIENT)
Dept: UROLOGY | Facility: HOSPITAL | Age: 29
End: 2024-09-09

## 2024-09-09 LAB — NON GYNE INTERPRETATION: NEGATIVE

## 2024-09-09 NOTE — TELEPHONE ENCOUNTER
Phoned patient informing them of normal urine cytology test result as reviewed by DAYSI Temple. Urine cytology is negative for high-grade urothelial carcinoma. Patient verbalizes understanding. All questions answered. Encouraged to call office with questions or concerns.

## 2024-09-22 ENCOUNTER — PATIENT MESSAGE (OUTPATIENT)
Dept: FAMILY MEDICINE CLINIC | Facility: CLINIC | Age: 29
End: 2024-09-22

## 2024-09-22 DIAGNOSIS — L60.0 INGROWN TOENAIL: Primary | ICD-10-CM

## 2024-09-23 NOTE — TELEPHONE ENCOUNTER
From: Essence Borja  To: Ralf Nguyen  Sent: 9/22/2024 4:35 PM CDT  Subject: Possible referral     Hi,   I have had a past of ingrown toenails. I had a podiatrist before changing insurance.. but now I don’t think I’m able to go to him. He’s [facility] affiliated.   I currently have an ingrown toenail - it’s swollen and red. Do I need a referral for a podiatrist?   Thanks!    No

## 2024-09-24 NOTE — TELEPHONE ENCOUNTER
1. Ingrown toenail  Mychart message   \"I have had a past of ingrown toenails. I had a podiatrist before changing insurance.. but now I don’t think I’m able to go to him. He’s [facility] affiliated.   I currently have an ingrown toenail - it’s swollen and red. Do I need a referral for a podiatrist?\"  Signed referral to podiatrist  - Podiatry Referral - In Network    ISAIAH Rascon

## 2024-09-24 NOTE — TELEPHONE ENCOUNTER
Ralf, please see podiatry referral request. Pended.     Please respond directly to the patient if no additional staff support is required.

## 2024-10-02 ENCOUNTER — TELEPHONE (OUTPATIENT)
Dept: PHYSICAL THERAPY | Facility: HOSPITAL | Age: 29
End: 2024-10-02

## 2024-10-07 DIAGNOSIS — E66.812 CLASS 2 OBESITY WITHOUT SERIOUS COMORBIDITY WITH BODY MASS INDEX (BMI) OF 35.0 TO 35.9 IN ADULT, UNSPECIFIED OBESITY TYPE: ICD-10-CM

## 2024-10-07 RX ORDER — SEMAGLUTIDE 2.4 MG/.75ML
2.4 INJECTION, SOLUTION SUBCUTANEOUS WEEKLY
Qty: 4 EACH | Refills: 0 | Status: SHIPPED | OUTPATIENT
Start: 2024-10-07

## 2024-10-07 NOTE — TELEPHONE ENCOUNTER
Endocrine Refill protocol for oral medications / injectable    Protocol Criteria:  PASSED      If below requirement is met, send a 90-day supply with 1 refill per provider protocol.    Verify appointment with Endocrinology completed in the last 6 months or scheduled in the next 3 months.    Last completed office visit: 7/25/2024 Hamida Son MD   Next scheduled Follow up:   Future Appointments   Date Time Provider Department Center   1/23/2025  4:45 PM Hamida Son MD [facility]

## 2024-10-10 ENCOUNTER — TELEPHONE (OUTPATIENT)
Dept: PHYSICAL THERAPY | Facility: HOSPITAL | Age: 29
End: 2024-10-10

## 2024-10-14 ENCOUNTER — OFFICE VISIT (OUTPATIENT)
Dept: PHYSICAL THERAPY | Age: 29
End: 2024-10-14
Attending: OBSTETRICS & GYNECOLOGY
Payer: COMMERCIAL

## 2024-10-14 DIAGNOSIS — N39.41 URGE INCONTINENCE: ICD-10-CM

## 2024-10-14 DIAGNOSIS — N39.43 POST-VOID DRIBBLING: Primary | ICD-10-CM

## 2024-10-14 DIAGNOSIS — N39.3 FEMALE STRESS INCONTINENCE: ICD-10-CM

## 2024-10-14 DIAGNOSIS — N81.84 PELVIC MUSCLE WASTING: ICD-10-CM

## 2024-10-14 PROCEDURE — 97530 THERAPEUTIC ACTIVITIES: CPT

## 2024-10-14 PROCEDURE — 97162 PT EVAL MOD COMPLEX 30 MIN: CPT

## 2024-10-14 NOTE — PROGRESS NOTES
PELVIC FLOOR EVALUATION:   Referring Provider: Roni  Diagnosis: Post-void dribbling (N39.43)  Urge incontinence (N39.41)  Female stress incontinence (N39.3)  Pelvic muscle wasting (N81.84)        Date of onset: past 2 years    Precautions:  None Date of Service: 10/14/2024      PATIENT SUMMARY   Essence Borja is a 28 year old female who presents to therapy today with complaints of urinary leakage progressively getting worse over the past 2 years.   Current symptoms include: back pain, abdominal pain, vaginal pain, rectal pain, urgency/frequency, pressure, incomplete emptying, constipation, hesitancy, post void dribble, leakage, and leaking at night  History of condition: Pt has been having symptoms over the past 2 years, has progressively gotten worse, diagnosed with PCOS a little over a year ago. Pt is seeing GI, has hx of IBS-C/D. Has had imaging this past spring, nothing of note. Pt has been on/off birthcontrol since 17, currently depo shot. Pt does not get a period. Pt has been having constipation issues. Pt is on wegovy, per chart recent increase on wegovy 1.7 on 8/22/24.   Pt describes pain level: pain not quantified at this session    PMH: Past medical history was reviewed with Essence. Significant findings include  has a past medical history of Acne (2012), Allergic rhinitis, Anxiety, Chronic migraine, Contact allergic reaction, Environmental allergies, Esophageal reflux (2020), Hypothyroidism, Irritable bowel syndrome (2013), Kidney stone, Latent tuberculosis by blood test, Obesity (2020), Scoliosis, and TMJ (dislocation of temporomandibular joint).   PSH: Past surgical history was reviewed with Essence. Significant findings include   Past Surgical History:   Procedure Laterality Date    Colonoscopy  2021    Other surgical history  2017    wisdom teeth    [place] teeth removed          PLOF: Essence describes prior level of function as symptoms were not to the level of interfering with daily life prior to  about 2 years ago, the way they are now.   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    Obstetrical/Gynecological history:  Pregnant Now: No   0/Para 0  Complications during pregnancy/delivery: n/a  Last menstrual period: unsure, pt reports she has not had a period for a while  Birth Control Method: depo shot     URINARY HABITS  Types of symptoms: stress incontinence, incomplete emptying, and nocturia  Events associated with the onset of urinary complaints: after constipation and bowel issues started  Urodynamic Testing completed: no  Abdominal/Vaginal Pressure complaints: yes, quite a bit  Urinary Frequency: 10x/day  Urine Stream: varies  Amount: varies  Leaking occurs: sneeze, cough, laugh  Episodes of Leakage: 3 times per day, and after sleeping wakes up with some leakage  Amount of leakage: small amount  Pad use: no  Pad Change frequency: n/a  Nocturia: 1x  Fluid Intake: maybe 24-32 ounces a day  Bladder irritants: coffee with cream/sugar in the AM  Urine Stop test: yes  Post void dribble: yes  Hovering: no  Empty bladder just in case: sometimes  Do you ever leak urine without knowing it? Only when waking up with some leaking from during the night     BOWEL HABITS  Types of symptoms: Constipation, Pain, and Incomplete emptying   Frequency of bowel movements: 3x/day  Stool consistency: [place] Stool Scale: varies, 1 when traveling, 6 most often, can be a 3/4  Do you strain with defecation: Yes   Laxative/Stool softener use: miralax in the past  Taking fiber supplement: Yes, Dr. Tamayo recommended to start     SEXUAL HEALTH STATUS  Sexual [place] Status: yes  Pain with initial and/or deep penetration: no  Pain with pelvic exam/tampon use: no but uncomfortable     ASSESSMENT  Essence presents to physical therapy evaluation with primary c/o urinary leakage, pelvic pain, incomplete emptying. The results of the  objective tests and measures show symptoms with severe distress per PFDI20 score.  Functional deficits include but are not limited to inability to fully empty bladder with voids, pelvic pain, constipation with incomplete evacuation of bowels during BM.  Signs and symptoms are consistent with diagnosis of Post-void dribbling (N39.43), Urge incontinence (N39.41), Female stress incontinence (N39.3), Pelvic muscle wasting (N81.84) . Pt and PT discussed evaluation findings, pathology, POC and HEP.  Pt voiced understanding and performs HEP correctly without reported pain. Skilled Pelvic Physical Therapy is medically necessary to address the above impairments and reach functional goals.     OBJECTIVE:     PFDI 20    Scores   POPDI 6:    33.33   CRAD 8:    46.88   MALENA 6:    66.67   Summary:    146.88      Posture: slight rounded shoulder and PPT    Gait: WNL    Ortho screen and internal PFM assessment deferred to future session due to increased time required for history intake.     Today's Treatment and Response:   Patient education was provided on objective findings of external and internal evaluation and expectations with treatment outcomes.   Theract: (10 min) Educated on pelvic anatomy and function with diagrams and pelvic model, bladder normatives, adequate hydration levels, proper toileting posture, and instructed in bladder, bowel, and diet diary log and issued handout      HEP: Patient was instructed in and issued a HEP for:   Access Code: [email or web address]  URL: [email or web address]  Exercises  - Supine Diaphragmatic Breathing  - 1 x daily - 7 x weekly - 1 sets - 10 reps  - Seated Diaphragmatic Breathing  - 1 x daily - 7 x weekly - 1 sets - 10 reps  Patient Education  - Get To Know Your Pelvic Floor- Female  - What is Urinary Incontinence?  - Lifestyle Changes that Support Urinary Health  - Bowel Emptying Techniques  - Abdominal Massage for Constipation  - High-Fiber Diet to Support Pelvic Health  -  Bladder Log     Charges: PT Eval Moderate Complexity, 1 TA      Total Timed Treatment: 10 min     Total Treatment Time: 45 min      Based on clinical rationale and outcome measures, this evaluation involved Moderate Complexity decision making due to 3+ personal factors/comorbidities, 3 body structures involved/activity limitations, and unstable symptoms including stress urinary incontinence, urge urinary incontinence, pelvic pain, and constipation  PLAN OF CARE:    Goals: (to be met in 10-12 visits)  Patient instructed on bladder irritants, increased water intake to 64 ounces /day     Patient to report urinary frequency to every 2-4 hours to allow for independent ADLs    Patient reports a reduction in CARMEN from 3x/ day to 0-1x/ day resulting in no need for pad use.    Patient is able to perform Levator Ani contraction inverse to diaphragmatic breathing to allow for pelvic brace with ADLs without valsalva.     PF strength goal TBD after internal PFM assessment.       Patient reports change in [place] stool to #4 with daily bowel moment without straining as preventative measure for hemorrhoids and or pelvic organ prolapse.        Patient understands importance of performing HEP to prevent reoccurrence of symptoms.    Pt reports pelvic region pain decreased by 50% with ADLs.      Frequency / Duration: Patient will be seen for 1x/week or a total of 10-12 visits over a 90 day period.  Treatment will include: Manual Therapy, Neuromuscular Re-education, Self-Care Home Management, Therapeutic Activities, Therapeutic Exercise, and Home Exercise Program instruction      Education or treatment limitation: None  Rehab Potential:good        Patient/Family/Caregiver was advised of these findings, precautions, and treatment options and has agreed to actively participate in planning and for this course of care.     Thank you for your referral. Please co-sign or sign and return this letter via fax as soon as possible to [phone].  If you have any questions, please contact me at Dept: [phone]     Sincerely,  Electronically signed by therapist: Janay Urbano PT  Physician's certification required: Yes  I certify the need for these services furnished under this plan of treatment and while under my care.     X___________________________________________________ Date____________________     Certification From: 10/14/2024  To:1/12/2025

## 2024-10-21 ENCOUNTER — OFFICE VISIT (OUTPATIENT)
Dept: PHYSICAL THERAPY | Age: 29
End: 2024-10-21
Attending: OBSTETRICS & GYNECOLOGY
Payer: COMMERCIAL

## 2024-10-21 DIAGNOSIS — N39.3 FEMALE STRESS INCONTINENCE: ICD-10-CM

## 2024-10-21 DIAGNOSIS — N81.84 PELVIC MUSCLE WASTING: ICD-10-CM

## 2024-10-21 DIAGNOSIS — N39.43 POST-VOID DRIBBLING: Primary | ICD-10-CM

## 2024-10-21 DIAGNOSIS — N39.41 URGE INCONTINENCE: ICD-10-CM

## 2024-10-21 PROCEDURE — 97530 THERAPEUTIC ACTIVITIES: CPT

## 2024-10-21 PROCEDURE — 97110 THERAPEUTIC EXERCISES: CPT

## 2024-10-21 PROCEDURE — 97112 NEUROMUSCULAR REEDUCATION: CPT

## 2024-10-21 NOTE — PROGRESS NOTES
Diagnosis:   Post-void dribbling (N39.43)  Urge incontinence (N39.41)  Female stress incontinence (N39.3)  Pelvic muscle wasting (N81.84)       Referring Provider: Love Tamayo  Date of Evaluation:    10/14/24     Precautions:  None Next MD visit:   none scheduled  Date of Surgery: n/a   Insurance Primary/Secondary: [employer] [place] HMO / N/A     # Auth Visits: 8v thru 12/31            Subjective: Pt reports she did get email with link for HEP and videos. Completed bladder diary on a weekend day, feels it reflects a good day for her. Today had a rough workday, Mondays are most challenging.     Pain: did not quantify pain this session      Objective: see chart below  Bladder diary review: 9x, 3-7 seconds, pt is waking up with small amount of leakage    Informed consent for internal pelvic evaluation given: Yes     Internal Examination   Mons pubis: WNL  Labia majora: WNL  Labia minora: WNL  Urethral meatus: WNL  Introitus: WNL  Perineal body: WNL     Internal Palpation Left Right Comments   Superficial Transverse perineal minimal restriction minimal restriction     Bulbocavernosus minimal restriction minimal restriction     Ischiocavernosus minimal restriction minimal restriction     Deep Transverse Perineal moderate restriction moderate restriction     Compress Urethrae/Spinc. Urethrovaginalis    minimal restriction and pain minimal restriction     Pubococcygeus/Pubovaginalis minimal restriction minimal restriction     Iliococcygeus minimal restriction minimal restriction     Coccygeus not tested not tested     Piriformis not tested not tested     Obturator internus minimal restriction and tenderness minimal restriction and tenderness        Pelvic Floor Muscle strength: (PERF= Power/Endurance/Reps/Fast) MMT: 2/5/-/3  Unable to come back to baseline during quick contractions  External Anal Sphincter: not tested   Accessory Muscle Use: none      Tissue Laxity Test:  Anterior Wall: Min  Posterior Wall: WNL  Apical: Min      Diastasis Recti: none    Rib flare/gripping present: No        Assessment: Pt with minimal to moderate tension throughout PFMs, decreased B hip ext strength, decreased PFM ROM and decreased strength. These may be contributing factors to pt's symptoms of newer onset urinary leakage, in addition to constipation. Pt will benefit from B hip mobility and hip ext strengthening to decrease the demand of PFMs. Pt will benefit from dect and indirect PFM lengthening, and may be a candidate for dilators or pelvic wand for home use. HEP modified to include B hip stretches.     Goals: (to be met in 10-12 visits)  Patient instructed on bladder irritants, increased water intake to 64 ounces /day     Patient to report urinary frequency to every 2-4 hours to allow for independent ADLs    Patient reports a reduction in CARMEN from 3x/ day to 0-1x/ day resulting in no need for pad use.    Patient is able to perform Levator Ani contraction inverse to diaphragmatic breathing to allow for pelvic brace with ADLs without valsalva.      Patient exhibits an increase in pelvic floor strength from 2/5 with 3 count hold to 4/5  with 10 count hold to allow for pelvic brace with ADLs.     Patient reports change in [place] stool to #4 with daily bowel moment without straining as preventative measure for hemorrhoids and or pelvic organ prolapse.        Patient understands importance of performing HEP to prevent reoccurrence of symptoms.    Pt reports pelvic region pain decreased by 50% with ADLs.    Plan: review work day bladder diary, direct and indirect PFM lengthening, bowel massage, B hip ext strengthening: jennifer, sit>stand with exhale  Date: 10/21/2024  TX#: 2 Date:                 TX#: 3/ Date:                 TX#: 4/ Date:                 TX#: 5/ Date:   Tx#: 6/   Theract: 20 min  Pt ed on findings of internal PFM assessment and how they may be impacting symptoms  Reviewed bladder diary  Pt ed on bowel massage       Therex:10 min  Seated  piriformis stretch R/L x1 min ea  Seated hamstring stretch R/L x1 min ea  Supine PFM contraction x5       NMR: 10 min  Worked on diaphragmatic breathing and coordination of PFM contraction inverse to diaphragmatic breathing, direct PFM lengthening              HEP: Access Code: [email or web address]  URL: [email or web address]  Exercises  - Supine Diaphragmatic Breathing  - 1 x daily - 7 x weekly - 1 sets - 10 reps  - Seated Diaphragmatic Breathing  - 1 x daily - 7 x weekly - 1 sets - 10 reps  - Seated Hamstring Stretch  - 2-3 x daily - 7 x weekly - 1 sets - 1 reps - 1 min hold  - Seated Piriformis Stretch with Trunk Bend  - 2-3 x daily - 7 x weekly - 1 sets - 1 reps - 1 min hold  - Supine Abdominal Wall Massage  - 1 x daily - 7 x weekly - 1 sets - 1 reps - 3-5 min before bed time  Patient Education  - Get To Know Your Pelvic Floor- Female  - What is Urinary Incontinence?  - Lifestyle Changes that Support Urinary Health  - Bowel Emptying Techniques  - Abdominal Massage for Constipation  - High-Fiber Diet to Support Pelvic Health  - Bladder Log    Charges: 1 TA, 1 TE, 1 NMR       Total Timed Treatment: 40 min  Total Treatment Time: 45 min

## 2024-10-28 ENCOUNTER — APPOINTMENT (OUTPATIENT)
Dept: PHYSICAL THERAPY | Age: 29
End: 2024-10-28
Attending: OBSTETRICS & GYNECOLOGY
Payer: COMMERCIAL

## 2024-10-30 ENCOUNTER — OFFICE VISIT (OUTPATIENT)
Dept: PHYSICAL THERAPY | Age: 29
End: 2024-10-30
Attending: OBSTETRICS & GYNECOLOGY
Payer: COMMERCIAL

## 2024-10-30 DIAGNOSIS — N39.43 POST-VOID DRIBBLING: Primary | ICD-10-CM

## 2024-10-30 DIAGNOSIS — N39.3 FEMALE STRESS INCONTINENCE: ICD-10-CM

## 2024-10-30 DIAGNOSIS — N81.84 PELVIC MUSCLE WASTING: ICD-10-CM

## 2024-10-30 DIAGNOSIS — N39.41 URGE INCONTINENCE: ICD-10-CM

## 2024-10-30 PROCEDURE — 97110 THERAPEUTIC EXERCISES: CPT

## 2024-10-30 PROCEDURE — 97140 MANUAL THERAPY 1/> REGIONS: CPT

## 2024-10-30 NOTE — PROGRESS NOTES
Diagnosis:   Post-void dribbling (N39.43)  Urge incontinence (N39.41)  Female stress incontinence (N39.3)  Pelvic muscle wasting (N81.84)       Referring Provider: Love Tamayo  Date of Evaluation:    10/14/24     Precautions:  None Next MD visit:   none scheduled  Date of Surgery: n/a   Insurance Primary/Secondary: [employer] [place] HMO / N/A     # Auth Visits: 8v thru 12/31            Subjective: Pt reports after internal assessment last session she was leaking more, but then she went back to regular amount. Pt is paying more attention to just in case voids, and not emptying just to empty and she feels like that may be helping. Doing stretches. Tried bowel massage, but didn't like how it felt. Doing benefiber every day, but bowel symptoms change day by day. Pain after sex, for a few days, felt almost like period cramping.     Pain: not quantified this session      Objective: see chart below    Informed consent for internal pelvic assessment and treatment given: Yes       Assessment: Pt with minimal to moderate tension throughout PFMs, with improved PFM contraction after manual release work. Pt tolerated bowel massage without increased discomfort.        Goals: (to be met in 10-12 visits)  Patient instructed on bladder irritants, increased water intake to 64 ounces /day     Patient to report urinary frequency to every 2-4 hours to allow for independent ADLs    Patient reports a reduction in CARMEN from 3x/ day to 0-1x/ day resulting in no need for pad use.    Patient is able to perform Levator Ani contraction inverse to diaphragmatic breathing to allow for pelvic brace with ADLs without valsalva.      Patient exhibits an increase in pelvic floor strength from 2/5 with 3 count hold to 4/5  with 10 count hold to allow for pelvic brace with ADLs.     Patient reports change in [place] stool to #4 with daily bowel moment without straining as preventative measure for hemorrhoids and or pelvic organ prolapse.        Patient  understands importance of performing HEP to prevent reoccurrence of symptoms.    Pt reports pelvic region pain decreased by 50% with ADLs.    Plan: review work day bladder diary, direct and indirect PFM lengthening, bowel massage, B hip ext strengthening: bridges, sit>stand with exhale  Date: 10/21/2024  TX#: 2 Date: 10/30/24                TX#: 3 Date:                 TX#: 4/ Date:                 TX#: 5/ Date:   Tx#: 6/   Theract: 20 min  Pt ed on findings of internal PFM assessment and how they may be impacting symptoms  Reviewed bladder diary  Pt ed on bowel massage Manual: 26 min  STM/MFR PFMs layers 1-3  Bowel massage      Therex:10 min  Seated piriformis stretch R/L x1 min ea  Seated hamstring stretch R/L x1 min ea  Supine PFM contraction x5 Therex: 12 min  Hooklying trunk rotation x10  Bridge with hip add + exhale x10  Prone breathing x10  Butterfly stretch + diaphragmatic breathing x10      NMR: 10 min  Worked on diaphragmatic breathing and coordination of PFM contraction inverse to diaphragmatic breathing, direct PFM lengthening              HEP: Access Code: [email or web address]  URL: [email or web address]  Date: 10/30/2024  Prepared by: Janay Porod  Exercises  - Seated Hamstring Stretch  - 2-3 x daily - 7 x weekly - 1 sets - 1 reps - 1 min hold  - Seated Piriformis Stretch with Trunk Bend  - 2-3 x daily - 7 x weekly - 1 sets - 1 reps - 1 min hold  - Supine Lower Trunk Rotation  - 1 x daily - 7 x weekly - 1 sets - 10 reps  - Supine Butterfly Groin Stretch  - 1 x daily - 7 x weekly - 1 sets - 10 reps  - Supine Bridge with Mini Swiss Ball Between Knees  - 1 x daily - 7 x weekly - 1 sets - 10 reps    Charges: 2 Man, 1 TE       Total Timed Treatment: 40 min  Total Treatment Time: 45 min

## 2024-11-04 ENCOUNTER — OFFICE VISIT (OUTPATIENT)
Dept: PHYSICAL THERAPY | Age: 29
End: 2024-11-04
Attending: OBSTETRICS & GYNECOLOGY
Payer: COMMERCIAL

## 2024-11-04 DIAGNOSIS — N39.43 POST-VOID DRIBBLING: Primary | ICD-10-CM

## 2024-11-04 DIAGNOSIS — N81.84 PELVIC MUSCLE WASTING: ICD-10-CM

## 2024-11-04 DIAGNOSIS — N39.3 FEMALE STRESS INCONTINENCE: ICD-10-CM

## 2024-11-04 DIAGNOSIS — N39.41 URGE INCONTINENCE: ICD-10-CM

## 2024-11-04 PROCEDURE — 97110 THERAPEUTIC EXERCISES: CPT

## 2024-11-04 PROCEDURE — 97140 MANUAL THERAPY 1/> REGIONS: CPT

## 2024-11-05 NOTE — PROGRESS NOTES
Diagnosis:   Post-void dribbling (N39.43)  Urge incontinence (N39.41)  Female stress incontinence (N39.3)  Pelvic muscle wasting (N81.84)       Referring Provider: Love Tamayo  Date of Evaluation:    10/14/24     Precautions:  None Next MD visit:   none scheduled  Date of Surgery: n/a   Insurance Primary/Secondary: [employer] [place] HMO / N/A     # Auth Visits: 8v thru 12/31            Subjective: Pt reports she leaks the most in the morning, but will have some random leaks too. Felt ok after last internal treatment, did not feel any increased leakage after. Always has abdominal pain, and sometimes hip pain. Pt reports she had a BM earlier today, but bowels are not as regular over the weekend.     Pain: 6/10 stomach/abdominal pain      Objective: see chart below    Informed consent for internal pelvic assessment and treatment given: Yes       Assessment: Pt with significantly improved tension throughout PFMs as compared to last session, with improved PFM contraction after manual release work today and working on coordination of PFM contraction inverse to diaphragmatic breathing. Pt with some pain with deep palpation to R lower quadrant, and B piriformis today, with B piriformis pain decreased with superior tissue approximation over sacrum. Pt will continue to benefit from skilled PT intervention.       Goals: (to be met in 10-12 visits)  Patient instructed on bladder irritants, increased water intake to 64 ounces /day     Patient to report urinary frequency to every 2-4 hours to allow for independent ADLs    Patient reports a reduction in CARMEN from 3x/ day to 0-1x/ day resulting in no need for pad use.    Patient is able to perform Levator Ani contraction inverse to diaphragmatic breathing to allow for pelvic brace with ADLs without valsalva.      Patient exhibits an increase in pelvic floor strength from 2/5 with 3 count hold to 4/5  with 10 count hold to allow for pelvic brace with ADLs.     Patient reports change in  [place] stool to #4 with daily bowel moment without straining as preventative measure for hemorrhoids and or pelvic organ prolapse.        Patient understands importance of performing HEP to prevent reoccurrence of symptoms.    Pt reports pelvic region pain decreased by 50% with ADLs.    Plan: review work day bladder diary, direct and indirect PFM lengthening, bowel massage, B hip ext strengthening: bridges, sit>stand with exhale  Date: 10/21/2024  TX#: 2 Date: 10/30/24                TX#: 3 Date: 11/4/24               TX#: 4 Date:                 TX#: 5/ Date:   Tx#: 6/   Theract: 20 min  Pt ed on findings of internal PFM assessment and how they may be impacting symptoms  Reviewed bladder diary  Pt ed on bowel massage Manual: 26 min  STM/MFR PFMs layers 1-3  Bowel massage Manual: 30 min  STM/MFR PFMs layers 1-3  Bowel massage  STM/MFR to B post hip and lumbopelvic region     Therex:10 min  Seated piriformis stretch R/L x1 min ea  Seated hamstring stretch R/L x1 min ea  Supine PFM contraction x5 Therex: 12 min  Hooklying trunk rotation x10  Bridge with hip add + exhale x10  Prone breathing x10  Butterfly stretch + diaphragmatic breathing x10 Therex: 10 min  Cat cow x10  Clams R/L x10  Sidelying hip abd R/L x10     NMR: 10 min  Worked on diaphragmatic breathing and coordination of PFM contraction inverse to diaphragmatic breathing, direct PFM lengthening              HEP: Access Code: [email or web address]  URL: [email or web address]  Date: 11/04/2024  Prepared by: Janay Porod  Exercises  - Seated Hamstring Stretch  - 2-3 x daily - 7 x weekly - 1 sets - 1 reps - 1 min hold  - Seated Piriformis Stretch with Trunk Bend  - 2-3 x daily - 7 x weekly - 1 sets - 1 reps - 1 min hold  - Supine Lower Trunk Rotation  - 1 x daily - 7 x weekly - 1 sets - 10 reps  - Supine Butterfly Groin Stretch  - 1 x daily - 7 x weekly - 1 sets - 10 reps  - Supine Bridge with Mini Swiss Ball Between Knees  - 1 x daily - 7 x weekly - 1 sets  - 10 reps  - Cat Cow  - 1 x daily - 7 x weekly - 1 sets - 10 reps    Charges: 2 Man, 1 TE       Total Timed Treatment: 40 min  Total Treatment Time: 42 min

## 2024-11-13 ENCOUNTER — OFFICE VISIT (OUTPATIENT)
Dept: PHYSICAL THERAPY | Age: 29
End: 2024-11-13
Attending: OBSTETRICS & GYNECOLOGY
Payer: COMMERCIAL

## 2024-11-13 DIAGNOSIS — N39.43 POST-VOID DRIBBLING: Primary | ICD-10-CM

## 2024-11-13 DIAGNOSIS — N81.84 PELVIC MUSCLE WASTING: ICD-10-CM

## 2024-11-13 DIAGNOSIS — N39.41 URGE INCONTINENCE: ICD-10-CM

## 2024-11-13 DIAGNOSIS — N39.3 FEMALE STRESS INCONTINENCE: ICD-10-CM

## 2024-11-13 PROCEDURE — 97110 THERAPEUTIC EXERCISES: CPT

## 2024-11-13 PROCEDURE — 97530 THERAPEUTIC ACTIVITIES: CPT

## 2024-11-13 PROCEDURE — 97140 MANUAL THERAPY 1/> REGIONS: CPT

## 2024-11-13 NOTE — PROGRESS NOTES
Diagnosis:   Post-void dribbling (N39.43)  Urge incontinence (N39.41)  Female stress incontinence (N39.3)  Pelvic muscle wasting (N81.84)       Referring Provider: Love Tamayo  Date of Evaluation:    10/14/24     Precautions:  None Next MD visit:   none scheduled  Date of Surgery: n/a   Insurance Primary/Secondary: [employer] / N/A     # Auth Visits: 8v thru 12/31            Subjective: Pt has been leaking a lot this week, still having fluctuations with constipation/diarrhea. Pt had bad hip pain on Monday. Stomach pain currently. Pt feels like she needs to work on consistency.     Pain: 5/10 stomach/abdominal pain      Objective: see chart below       Assessment: Pt is having difficulty with increasing water intake, pt ed on importance of increasing fluid intake to address constipation, asnjana as she is on Wegovy. Pt still with abdominal pain, unchanged since starting PT. Pt encouraged to discuss potential side effects of Wegovy with prescribing physician, with regards to current symptoms she is having including constipation, occasional diarrhea, abdominal pain, fatigue, bloating. Performed dynamic cupping trial to abdominal region this session. Pt will benefit from increased consistency with water intake and HEP, discussed strategies to increase frequency and improve adherence of both.      Goals: (to be met in 10-12 visits)  Patient instructed on bladder irritants, increased water intake to 64 ounces /day (in progress)     Patient to report urinary frequency to every 2-4 hours to allow for independent ADLs (in progress)  Patient reports a reduction in CARMEN from 3x/ day to 0-1x/ day resulting in no need for pad use. (in progress)    Patient is able to perform Levator Ani contraction inverse to diaphragmatic breathing to allow for pelvic brace with ADLs without valsalva. (in progress)     Patient exhibits an increase in pelvic floor strength from 2/5 with 3 count hold to 4/5  with 10 count hold to allow for pelvic  brace with ADLs. (in progress)     Patient reports change in [place] stool to #4 with daily bowel moment without straining as preventative measure for hemorrhoids and or pelvic organ prolapse.  (in progress)     Patient understands importance of performing HEP to prevent reoccurrence of symptoms. (in progress)    Pt reports pelvic region pain decreased by 50% with ADLs. (in progress)    Plan: direct and indirect PFM lengthening, bowel massage, B hip ext strengthening: bridges, sit>stand with exhale  Date: 10/21/2024  TX#: 2 Date: 10/30/24                TX#: 3 Date: 11/4/24               TX#: 4 Date:   11/13/24              TX#: 5 Date:   Tx#: 6/   Theract: 20 min  Pt ed on findings of internal PFM assessment and how they may be impacting symptoms  Reviewed bladder diary  Pt ed on bowel massage Manual: 26 min  STM/MFR PFMs layers 1-3  Bowel massage Manual: 30 min  STM/MFR PFMs layers 1-3  Bowel massage  STM/MFR to B post hip and lumbopelvic region Manual: 20 min  Bowel massage, dynamic cupping to abdominals    Therex:10 min  Seated piriformis stretch R/L x1 min ea  Seated hamstring stretch R/L x1 min ea  Supine PFM contraction x5 Therex: 12 min  Hooklying trunk rotation x10  Bridge with hip add + exhale x10  Prone breathing x10  Butterfly stretch + diaphragmatic breathing x10 Therex: 10 min  Cat cow x10  Clams R/L x10  Sidelying hip abd R/L x10 Therex: 10 min  Open book stretch R/L x10  Seated thoracic ext x10      NMR: 10 min  Worked on diaphragmatic breathing and coordination of PFM contraction inverse to diaphragmatic breathing, direct PFM lengthening   Theract: 10 min  Pt ed on common side effects of Wegovy, re-ed on importance of water intake and hydration on BM regularity, and on impact of constipation on CARMEN/UUI symptoms           HEP: Access Code: [email or web address]  URL: [email or web address]  Date: 11/13/2024  Prepared by: Janay Urbano  Exercises  - Supine Butterfly Groin Stretch  - 1 x daily - 7 x  weekly - 1 sets - 10 reps  - Cat Cow  - 1 x daily - 7 x weekly - 1 sets - 10 reps  - Sidelying Open Book Thoracic Lumbar Rotation and Extension  - 1 x daily - 7 x weekly - 1 sets - 10 reps  - Seated Thoracic Extension with Hands Behind Neck  - 1-5 x daily - 7 x weekly - 1 sets - 3 reps - 5 sec hold    Charges: 1 Man, 1 TE, 1 TA       Total Timed Treatment: 40 min  Total Treatment Time: 40 min

## 2024-11-14 ENCOUNTER — NURSE ONLY (OUTPATIENT)
Dept: OBGYN CLINIC | Facility: CLINIC | Age: 29
End: 2024-11-14
Payer: COMMERCIAL

## 2024-11-14 DIAGNOSIS — Z30.42 ENCOUNTER FOR DEPO-PROVERA CONTRACEPTION: Primary | ICD-10-CM

## 2024-11-14 PROCEDURE — 96372 THER/PROPH/DIAG INJ SC/IM: CPT | Performed by: OBSTETRICS & GYNECOLOGY

## 2024-11-14 RX ORDER — MEDROXYPROGESTERONE ACETATE 150 MG/ML
150 INJECTION, SUSPENSION INTRAMUSCULAR
Status: SHIPPED | OUTPATIENT
Start: 2024-11-14 | End: 2025-08-11

## 2024-11-14 RX ADMIN — MEDROXYPROGESTERONE ACETATE 150 MG: 150 INJECTION, SUSPENSION INTRAMUSCULAR at 11:10:00

## 2024-11-14 NOTE — PROGRESS NOTES
Patient here for nurse visit for Depo-Provera administration. Two patient identifiers verified with patient.     Last Depo:  08/28/2024  Due for next Depo 11/13-11/27  Last visit with Dr. Maloney on 05/14/24  Last Pap 04/11/2023 due 04/2026      Depo Injection given in  Left Upper Outer Gluteus   Patient tolerated injection well no reaction at this time.   Depo calender given to pt. Pt aware to return to office Jan 30-Feb 13 for next Depo Injection.       Patient verbalized understanding and agrees with plan of care

## 2024-11-20 ENCOUNTER — OFFICE VISIT (OUTPATIENT)
Dept: PHYSICAL THERAPY | Age: 29
End: 2024-11-20
Attending: OBSTETRICS & GYNECOLOGY
Payer: COMMERCIAL

## 2024-11-20 DIAGNOSIS — N39.3 FEMALE STRESS INCONTINENCE: ICD-10-CM

## 2024-11-20 DIAGNOSIS — N81.84 PELVIC MUSCLE WASTING: ICD-10-CM

## 2024-11-20 DIAGNOSIS — N39.41 URGE INCONTINENCE: ICD-10-CM

## 2024-11-20 DIAGNOSIS — N39.43 POST-VOID DRIBBLING: Primary | ICD-10-CM

## 2024-11-20 PROCEDURE — 97530 THERAPEUTIC ACTIVITIES: CPT

## 2024-11-20 NOTE — PROGRESS NOTES
Diagnosis:   Post-void dribbling (N39.43)  Urge incontinence (N39.41)  Female stress incontinence (N39.3)  Pelvic muscle wasting (N81.84)       Referring Provider: Love Tamayo  Date of Evaluation:    10/14/24     Precautions:  None Next MD visit:   none scheduled  Date of Surgery: n/a   Insurance Primary/Secondary: [place] / N/A     # Auth Visits: 8v thru 12/31            Subjective: Pt reports symptoms have been pretty much the same, stomach was ok today, bladder was at baseline, hip is hurting today. Per pt she thinks something is going on with her thyroid, because of increased fatigue.     Pain: 7/10 hip pain      Objective: see chart below       Assessment: Pt reports symptoms are about the same, with no change after cupping treatment last session, and does not feel any change from internal PFM treatment. Reviewed goals, intervention trials performed and outcomes, and home program. Pt with some overall difficulty with consistency, may benefit from trial of yoga 10-15 min 3x/wk, in an attempt to increase HEP adherence. Pt encouraged to speak with PCP team to perform a medication review, as pt is taking multiple medications, and is unsure of possible side effects and/or interactions. Pt may benefit from a trial of shifting focus of PT treatment on NS downregulation in an attempt to reduce symptoms with reduction of PFM tension via reduction in overall NS tension. Pt agreeable to trial. Also discussed potential benefit of working with mental health professional to address outstanding issues related to stress.       Goals: (to be met in 10-12 visits)  Patient instructed on bladder irritants, increased water intake to 64 ounces /day (in progress)     Patient to report urinary frequency to every 2-4 hours to allow for independent ADLs (in progress)  Patient reports a reduction in CARMEN from 3x/ day to 0-1x/ day resulting in no need for pad use. (in progress)    Patient is able to perform Levator Ani contraction  inverse to diaphragmatic breathing to allow for pelvic brace with ADLs without valsalva. (in progress)     Patient exhibits an increase in pelvic floor strength from 2/5 with 3 count hold to 4/5  with 10 count hold to allow for pelvic brace with ADLs. (in progress)     Patient reports change in [place] stool to #4 with daily bowel moment without straining as preventative measure for hemorrhoids and or pelvic organ prolapse.  (in progress)     Patient understands importance of performing HEP to prevent reoccurrence of symptoms. (in progress)    Pt reports pelvic region pain decreased by 50% with ADLs. (in progress)    Plan: direct and indirect PFM lengthening, bowel massage, B hip ext strengthening: bridges, sit>stand with exhale  Date: 10/21/2024  TX#: 2 Date: 10/30/24                TX#: 3 Date: 11/4/24               TX#: 4 Date: 11/13/24              TX#: 5 Date: 11/20/24  Tx#: 6   Theract: 20 min  Pt ed on findings of internal PFM assessment and how they may be impacting symptoms  Reviewed bladder diary  Pt ed on bowel massage Manual: 26 min  STM/MFR PFMs layers 1-3  Bowel massage Manual: 30 min  STM/MFR PFMs layers 1-3  Bowel massage  STM/MFR to B post hip and lumbopelvic region Manual: 20 min  Bowel massage, dynamic cupping to abdominals Theract: 45 min  Reviewed home program, educated pt on potential benefits of pelvic wand use, pt ed on NS downregulation approach to reducing PFM tension, pt ed on alternative resources to potentially address outstanding symptoms   Therex:10 min  Seated piriformis stretch R/L x1 min ea  Seated hamstring stretch R/L x1 min ea  Supine PFM contraction x5 Therex: 12 min  Hooklying trunk rotation x10  Bridge with hip add + exhale x10  Prone breathing x10  Butterfly stretch + diaphragmatic breathing x10 Therex: 10 min  Cat cow x10  Clams R/L x10  Sidelying hip abd R/L x10 Therex: 10 min  Open book stretch R/L x10  Seated thoracic ext x10      NMR: 10 min  Worked on diaphragmatic  breathing and coordination of PFM contraction inverse to diaphragmatic breathing, direct PFM lengthening   Theract: 10 min  Pt ed on common side effects of Wegovy, re-ed on importance of water intake and hydration on BM regularity, and on impact of constipation on CARMEN/UUI symptoms           HEP: Access Code: [email or web address]  URL: [email or web address]  Date: 11/13/2024  Prepared by: Janay Porod  Exercises  - Supine Butterfly Groin Stretch  - 1 x daily - 7 x weekly - 1 sets - 10 reps  - Cat Cow  - 1 x daily - 7 x weekly - 1 sets - 10 reps  - Sidelying Open Book Thoracic Lumbar Rotation and Extension  - 1 x daily - 7 x weekly - 1 sets - 10 reps  - Seated Thoracic Extension with Hands Behind Neck  - 1-5 x daily - 7 x weekly - 1 sets - 3 reps - 5 sec hold    Charges: 3 TA       Total Timed Treatment: 45 min  Total Treatment Time: 45 min

## 2024-11-27 ENCOUNTER — OFFICE VISIT (OUTPATIENT)
Dept: PHYSICAL THERAPY | Age: 29
End: 2024-11-27
Attending: OBSTETRICS & GYNECOLOGY
Payer: COMMERCIAL

## 2024-11-27 DIAGNOSIS — N39.3 FEMALE STRESS INCONTINENCE: ICD-10-CM

## 2024-11-27 DIAGNOSIS — N39.43 POST-VOID DRIBBLING: Primary | ICD-10-CM

## 2024-11-27 DIAGNOSIS — N81.84 PELVIC MUSCLE WASTING: ICD-10-CM

## 2024-11-27 DIAGNOSIS — N39.41 URGE INCONTINENCE: ICD-10-CM

## 2024-11-27 PROCEDURE — 97530 THERAPEUTIC ACTIVITIES: CPT

## 2024-11-27 PROCEDURE — 97110 THERAPEUTIC EXERCISES: CPT

## 2024-11-27 NOTE — PROGRESS NOTES
Discharge Summary  Pt has attended 7 visits in Physical Therapy.     Diagnosis:   Post-void dribbling (N39.43)  Urge incontinence (N39.41)  Female stress incontinence (N39.3)  Pelvic muscle wasting (N81.84)       Referring Provider: Love Tamayo  Date of Evaluation:    10/14/24     Precautions:  None Next MD visit:   none scheduled  Date of Surgery: n/a   Insurance Primary/Secondary: [place] / N/A     # Auth Visits: 8v thru 12/31            Subjective: Pt reports symptoms are about the same, was constipated the other day and was getting lower abdominal pain towards vagina, that has mostly subsided. Hip pain is not too bad this week. Pt requesting to discharge today due to scheduling issues throughout December. Pt stated she made an appt with PCP to review medications.     Pain: 6/10 pelvic pain      Objective: see chart below  Pt given informational handout about Lifestyle Nutrition through the [facility] through [facility]       Assessment: Pt reports overall no significant change in symptoms since starting PT. Reviewed goals, intervention trials performed and outcomes, and home program. Focused on stretching and basic strengthening strategies pt can work on to continue to address symptoms and overall general physical health, with no increased pain with exercise trials. Pt with some overall difficulty with consistency, may benefit from trial of yoga 10-15 min 3x/wk, or working with a personal training to start a strengthening program, in an attempt to increase HEP adherence. Pt would benefit from reviewing medications with PCP team, as constipation and fatigue have been a barrier to progress. Pt may also benefit from working on nutrition component with an RN or with lifestyle nutrition through integrative med clinic. Pt requesting discharge at this time.       Goals: (to be met in 10-12 visits)  Patient instructed on bladder irritants, increased water intake to 64 ounces /day  (not met)     Patient to report urinary frequency to every 2-4 hours to allow for independent ADLs (MET)  Patient reports a reduction in CARMEN from 3x/ day to 0-1x/ day resulting in no need for pad use. (not met)    Patient is able to perform Levator Ani contraction inverse to diaphragmatic breathing to allow for pelvic brace with ADLs without valsalva. (not met)     Patient exhibits an increase in pelvic floor strength from 2/5 with 3 count hold to 4/5  with 10 count hold to allow for pelvic brace with ADLs. (not met)     Patient reports change in [place] stool to #4 with daily bowel moment without straining as preventative measure for hemorrhoids and or pelvic organ prolapse. (not met)     Patient understands importance of performing HEP to prevent reoccurrence of symptoms. (not met)    Pt reports pelvic region pain decreased by 50% with ADLs. (not met)    Plan: discharge  Date: 11/4/24               TX#: 4 Date: 11/13/24              TX#: 5 Date: 11/20/24  Tx#: 6 Date: 11/27/24  Tx#: 7   Manual: 30 min  STM/MFR PFMs layers 1-3  Bowel massage  STM/MFR to B post hip and lumbopelvic region Manual: 20 min  Bowel massage, dynamic cupping to abdominals Theract: 45 min  Reviewed home program, educated pt on potential benefits of pelvic wand use, pt ed on NS downregulation approach to reducing PFM tension, pt ed on alternative resources to potentially address outstanding symptoms Theract: 15 min  Reviewed progress toward goals and outstanding deficits, rec lifestyle nutrition with intergrative medicine clinic, rec for general exercise  Reviewed PFMs with model, function, and how other systems can impact normal PFM functional   Therex: 10 min  Cat cow x10  Clams R/L x10  Sidelying hip abd R/L x10 Therex: 10 min  Open book stretch R/L x10  Seated thoracic ext x10    Therex: 25 min  Shuttle DL 5c x10, SL 5c x10  Standing B calf stretch x1 min  Standing SL calf stretch x30\" ea  Hamstring stretch green strap R/L x30\"  Pilates  ring hamstring stretch, R/L x30\"  Sit<>stand with db at chest height 10# x8, 15# x8, 20# x8    Theract: 10 min  Pt ed on common side effects of Wegovy, re-ed on importance of water intake and hydration on BM regularity, and on impact of constipation on CARMEN/UUI symptoms           HEP: Access Code: [email or web address]  URL: [email or web address]  Date: 11/13/2024  Prepared by: Janay Urbano  Exercises  - Supine Butterfly Groin Stretch  - 1 x daily - 7 x weekly - 1 sets - 10 reps  - Cat Cow  - 1 x daily - 7 x weekly - 1 sets - 10 reps  - Sidelying Open Book Thoracic Lumbar Rotation and Extension  - 1 x daily - 7 x weekly - 1 sets - 10 reps  - Seated Thoracic Extension with Hands Behind Neck  - 1-5 x daily - 7 x weekly - 1 sets - 3 reps - 5 sec hold    Charges: 1 TA, 2 TE       Total Timed Treatment: 40 min  Total Treatment Time: 45 min    Plan: Discharge with HEP. Patient was instructed to follow up with their physician should an exacerbation of symptoms arise.     Thank you for your referral. If you have any questions, please contact me at Dept: [phone].    Sincerely,  Electronically signed by therapist: Janay Urbano, PT     Physician's certification required:  No

## 2024-11-30 NOTE — PROGRESS NOTES
Subjective:   Essence Borja is a 28 year old female who presents for Medication Problem (Thinks thyroid meds causing constant fatigue, wants to discuss )   Patient is a pleasant 28-year-old female with past medical history significant for acne, IR, anxiety, migraines, hypothyroidism, IBS, obesity, scoliosis, and TMJ. Patient presents to office today for medication review secondary to constipation and fatigue affecting progress in therapy for PVD and urge incontinence. Patient states she is having fatigue and wanted to see if she can have her thyroid checked. She feels like she is in a slump right now. Doesn't always want to do things. Her mood is just \"nanette\". Denies SI/HI. Has questions about meds. Advised exercise is as adventitious as antidepressants. She is taking daily vitamin D. Will check vitamin d and TSH.          Past Medical History:    Acne    I was about 16 years old when it started. Got worse in college ~2018    Allergic rhinitis    since childhood    Anxiety    Chronic migraine    Contact allergic reaction    Environmental allergies    Childhood    Esophageal reflux    had Colonoscopy    Hypothyroidism    Irritable bowel syndrome    runs in family    Kidney stone    Latent tuberculosis by blood test    Obesity    Scoliosis    slight scoliosis    TMJ (dislocation of temporomandibular joint)      Past Surgical History:   Procedure Laterality Date    Colonoscopy  2021    Other surgical history  2017    wisdom teeth    [place] teeth removed          History/Other:    Chief Complaint Reviewed and Verified  Nursing Notes Reviewed and   Verified  Tobacco Reviewed  Allergies Reviewed  Medications Reviewed    Problem List Reviewed  Medical History Reviewed  Surgical History   Reviewed  OB Status Reviewed  Family History Reviewed  Social History   Reviewed         Tobacco:  Social History     Tobacco Use   Smoking Status Former    Types: Cigarettes    Passive exposure: Never   Smokeless Tobacco Never    Tobacco Comments    a few cigarettes a day, now I use a vape     E-Cigarettes/Vaping       Questions Responses    E-Cigarette Use Current Every Day User    Cartridges/Day vaping          E-Cigarette/Vaping Substances       Questions Responses    Nicotine Yes    THC No    CBD No    Flavoring Yes    Other 5%          E-Cigarette/Vaping Devices       Questions Responses    Disposable Yes    Pre-filled or Refillable Cartridge Yes    Refillable Tank No    Pre-filled Pod No           Tobacco cessation counseling for <3 minutes.  She smoked tobacco in the past but quit greater than 12 months ago.  Social History     Tobacco Use   Smoking Status Former    Types: Cigarettes    Passive exposure: Never   Smokeless Tobacco Never   Tobacco Comments    a few cigarettes a day, now I use a vape          Current Outpatient Medications   Medication Sig Dispense Refill    semaglutide-weight management (WEGOVY) 2.4 MG/0.75ML Subcutaneous Solution Auto-injector Inject 0.75 mL (2.4 mg total) into the skin once a week. 4 each 0    Galcanezumab-gnlm (EMGALITY) 120 MG/ML Subcutaneous Solution Auto-injector ADMINISTER 1 ML UNDER THE SKIN EVERY 30 DAYS 1 mL 11    levothyroxine 150 MCG Oral Tab Take 1 tablet (150 mcg total) by mouth daily. 90 tablet 3    SUMAtriptan Succinate 100 MG Oral Tab TAKE AT ONSET; REPEAT ONCE AFTER 2 HOURS MAXIMUM DAILY DOSE IS 2 TABLETS 9 tablet 2    clindamycin 1 % External Lotion Apply twice daily with flares of pimples on body 60 mL 11    LEVOCETIRIZINE 5 MG Oral Tab TAKE 1 TABLET(5 MG) BY MOUTH EVERY EVENING 90 tablet 1    Vitamin D3, Cholecalciferol, (VITAMIN D3) 125 MCG (5000 UT) Oral Cap Take 1 capsule (5,000 Units total) by mouth daily.      Cyanocobalamin (VITAMIN B-12 ER) 2000 MCG Oral Tab CR Take 1 tablet (2,000 mcg total) by mouth daily.      Fluocinonide 0.05 % External Solution Use twice daily Monday-Friday with flares on scalp. Do not use on face. (Patient not taking: Reported on 12/2/2024) 60 mL 5     ketoconazole 2 % External Shampoo Use 2-3 times weekly. Lather into scalp and leave on for 5 minutes before washing off. (Patient not taking: Reported on 12/2/2024) 120 mL 11    Tretinoin 0.05 % External Cream Apply pea sized amount to the full face at night, making sure to avoid the eyes and lips. Wash off in the morning. Start using every other night and then progress to every night as tolerated. Apply moisturizer nightly to avoid excessive drying (Patient not taking: Reported on 12/2/2024) 45 g 0    azelastine 0.1 % Nasal Solution by Nasal route 2 (two) times daily. (Patient not taking: Reported on 12/2/2024)      MULTIPLE VITAMIN OR Take 1 tablet by mouth daily.           Review of Systems:  Review of Systems   Constitutional: Negative.  Negative for activity change and appetite change.   HENT: Negative.  Negative for congestion, postnasal drip, rhinorrhea, sore throat, tinnitus and voice change.    Eyes: Negative.    Respiratory: Negative.  Negative for apnea, cough, chest tightness and shortness of breath.    Cardiovascular:  Negative for chest pain and leg swelling.   Gastrointestinal: Negative.  Negative for abdominal pain, anal bleeding and blood in stool.   Genitourinary: Negative.  Negative for difficulty urinating, flank pain and menstrual problem.   Musculoskeletal: Negative.  Negative for joint swelling.   Skin: Negative.    Neurological: Negative.  Negative for dizziness and headaches.   Psychiatric/Behavioral:  Positive for dysphoric mood and self-injury. Negative for agitation, decreased concentration and suicidal ideas.          Objective:   /89 (BP Location: Right arm, Patient Position: Sitting, Cuff Size: large)   Pulse 116   Ht 5' 5\" (1.651 m)   Wt 213 lb 3.2 oz (96.7 kg)   LMP 10/26/2023   SpO2 98%   BMI 35.48 kg/m²  Estimated body mass index is 35.48 kg/m² as calculated from the following:    Height as of this encounter: 5' 5\" (1.651 m).    Weight as of this encounter: 213 lb  3.2 oz (96.7 kg).  Physical Exam  Vitals and nursing note reviewed.   Constitutional:       General: She is not in acute distress.     Appearance: Normal appearance. She is well-developed. She is obese.   HENT:      Head: Normocephalic and atraumatic.      Right Ear: External ear normal.      Left Ear: External ear normal.   Eyes:      Conjunctiva/sclera: Conjunctivae normal.   Neck:      Thyroid: No thyromegaly.   Cardiovascular:      Rate and Rhythm: Normal rate and regular rhythm.      Pulses: Normal pulses.      Heart sounds: Normal heart sounds. No murmur heard.  Pulmonary:      Effort: Pulmonary effort is normal. No respiratory distress.      Breath sounds: Normal breath sounds. No wheezing or rales.   Chest:      Chest wall: No tenderness.   Musculoskeletal:      Cervical back: Normal range of motion and neck supple.   Lymphadenopathy:      Cervical: No cervical adenopathy.   Skin:     Capillary Refill: Capillary refill takes less than 2 seconds.   Neurological:      Mental Status: She is alert and oriented to person, place, and time.      Coordination: Coordination normal.   Psychiatric:         Behavior: Behavior normal.         Thought Content: Thought content normal.         Judgment: Judgment normal.           Assessment & Plan:   1. Fatigue, unspecified type (Primary)  -     Vitamin D; Future; Expected date: 12/02/2024  -     TSH W Reflex To Free T4; Future; Expected date: 12/02/2024  2. Seasonal depression (HCC)  -     Vitamin D; Future; Expected date: 12/02/2024    1. Fatigue, unspecified type  Increased fatigue and dysphoric mood lately  Has been one month  Does not want to get up and move some days  Check TSH and vitamin D  Encouraged exercise.  Likely SADD  - Vitamin D [E]; Future  - TSH W Reflex To Free T4; Future    2. Seasonal depression (HCC)  Encouraged physical activity.  Check vitamin D  Uv lights otc  - Vitamin D [E]; Future    Patient aware of plan of care. All questions answered to  satisfaction of the patient. Patient instructed to call office or reach out via [employer] if any issues arise. For urgent issues and/or reviewed red flags please proceed to the urgent care or ER.  Also, inform the nurse practitioner with any new symptoms or medication side effects.        Return if symptoms worsen or fail to improve, for Annual physical.    Ralf Nguyen, APRN, 11/30/2024, 5:19 PM

## 2024-12-02 ENCOUNTER — OFFICE VISIT (OUTPATIENT)
Dept: FAMILY MEDICINE CLINIC | Facility: CLINIC | Age: 29
End: 2024-12-02

## 2024-12-02 VITALS
BODY MASS INDEX: 35.52 KG/M2 | WEIGHT: 213.19 LBS | SYSTOLIC BLOOD PRESSURE: 135 MMHG | HEIGHT: 65 IN | DIASTOLIC BLOOD PRESSURE: 89 MMHG | HEART RATE: 116 BPM | OXYGEN SATURATION: 98 %

## 2024-12-02 DIAGNOSIS — R53.83 FATIGUE, UNSPECIFIED TYPE: Primary | ICD-10-CM

## 2024-12-02 DIAGNOSIS — F33.8 SEASONAL DEPRESSION (HCC): ICD-10-CM

## 2024-12-02 PROCEDURE — 99213 OFFICE O/P EST LOW 20 MIN: CPT

## 2024-12-02 PROCEDURE — 3075F SYST BP GE 130 - 139MM HG: CPT

## 2024-12-02 PROCEDURE — 3079F DIAST BP 80-89 MM HG: CPT

## 2024-12-02 PROCEDURE — 3008F BODY MASS INDEX DOCD: CPT

## 2024-12-03 ENCOUNTER — PATIENT MESSAGE (OUTPATIENT)
Dept: DERMATOLOGY CLINIC | Facility: CLINIC | Age: 29
End: 2024-12-03

## 2024-12-03 RX ORDER — TRETINOIN 0.25 MG/G
CREAM TOPICAL
Qty: 20 G | Refills: 0 | Status: SHIPPED | OUTPATIENT
Start: 2024-12-03

## 2024-12-04 ENCOUNTER — APPOINTMENT (OUTPATIENT)
Dept: PHYSICAL THERAPY | Age: 29
End: 2024-12-04
Attending: OBSTETRICS & GYNECOLOGY

## 2024-12-09 ENCOUNTER — APPOINTMENT (OUTPATIENT)
Dept: PHYSICAL THERAPY | Age: 29
End: 2024-12-09
Attending: OBSTETRICS & GYNECOLOGY

## 2024-12-10 ENCOUNTER — LAB ENCOUNTER (OUTPATIENT)
Dept: LAB | Facility: HOSPITAL | Age: 29
End: 2024-12-10
Payer: COMMERCIAL

## 2024-12-10 DIAGNOSIS — E66.812 CLASS 2 OBESITY WITHOUT SERIOUS COMORBIDITY WITH BODY MASS INDEX (BMI) OF 35.0 TO 35.9 IN ADULT, UNSPECIFIED OBESITY TYPE: ICD-10-CM

## 2024-12-10 DIAGNOSIS — F33.8 SEASONAL DEPRESSION (HCC): ICD-10-CM

## 2024-12-10 DIAGNOSIS — R53.83 FATIGUE, UNSPECIFIED TYPE: ICD-10-CM

## 2024-12-10 DIAGNOSIS — E88.810 METABOLIC SYNDROME: ICD-10-CM

## 2024-12-10 LAB
ALBUMIN SERPL-MCNC: 4.8 G/DL (ref 3.2–4.8)
ALBUMIN/GLOB SERPL: 1.5 {RATIO} (ref 1–2)
ALP LIVER SERPL-CCNC: 98 U/L
ALT SERPL-CCNC: 33 U/L
ANION GAP SERPL CALC-SCNC: 7 MMOL/L (ref 0–18)
AST SERPL-CCNC: 20 U/L (ref ?–34)
BILIRUB SERPL-MCNC: 0.5 MG/DL (ref 0.3–1.2)
BUN BLD-MCNC: 9 MG/DL (ref 9–23)
BUN/CREAT SERPL: 11.8 (ref 10–20)
CALCIUM BLD-MCNC: 10.3 MG/DL (ref 8.7–10.4)
CHLORIDE SERPL-SCNC: 108 MMOL/L (ref 98–112)
CHOLEST SERPL-MCNC: 167 MG/DL (ref ?–200)
CO2 SERPL-SCNC: 26 MMOL/L (ref 21–32)
CREAT BLD-MCNC: 0.76 MG/DL
EGFRCR SERPLBLD CKD-EPI 2021: 111 ML/MIN/1.73M2 (ref 60–?)
FASTING PATIENT LIPID ANSWER: YES
FASTING STATUS PATIENT QL REPORTED: YES
GLOBULIN PLAS-MCNC: 3.1 G/DL (ref 2–3.5)
GLUCOSE BLD-MCNC: 87 MG/DL (ref 70–99)
HDLC SERPL-MCNC: 34 MG/DL (ref 40–59)
LDLC SERPL CALC-MCNC: 110 MG/DL (ref ?–100)
NONHDLC SERPL-MCNC: 133 MG/DL (ref ?–130)
OSMOLALITY SERPL CALC.SUM OF ELEC: 290 MOSM/KG (ref 275–295)
POTASSIUM SERPL-SCNC: 4.2 MMOL/L (ref 3.5–5.1)
PROT SERPL-MCNC: 7.9 G/DL (ref 5.7–8.2)
SODIUM SERPL-SCNC: 141 MMOL/L (ref 136–145)
TRIGL SERPL-MCNC: 126 MG/DL (ref 30–149)
TSI SER-ACNC: 1.63 UIU/ML (ref 0.55–4.78)
VIT D+METAB SERPL-MCNC: 68.1 NG/ML (ref 30–100)
VLDLC SERPL CALC-MCNC: 22 MG/DL (ref 0–30)

## 2024-12-10 PROCEDURE — 82306 VITAMIN D 25 HYDROXY: CPT

## 2024-12-10 PROCEDURE — 84443 ASSAY THYROID STIM HORMONE: CPT

## 2024-12-10 PROCEDURE — 36415 COLL VENOUS BLD VENIPUNCTURE: CPT

## 2024-12-10 PROCEDURE — 80053 COMPREHEN METABOLIC PANEL: CPT

## 2024-12-10 PROCEDURE — 80061 LIPID PANEL: CPT

## 2024-12-10 PROCEDURE — 84410 TESTOSTERONE BIOAVAILABLE: CPT

## 2024-12-14 LAB
SEX HORM BIND GLOB: 14.9 NMOL/L
TESTOST % FREE+WEAK BND: 36.7 %
TESTOST FREE+WEAK BND: 4.3 NG/DL
TESTOSTERONE TOT /MS: 11.7 NG/DL

## 2024-12-16 ENCOUNTER — APPOINTMENT (OUTPATIENT)
Dept: PHYSICAL THERAPY | Age: 29
End: 2024-12-16
Attending: OBSTETRICS & GYNECOLOGY

## 2024-12-19 DIAGNOSIS — E66.812 CLASS 2 OBESITY WITHOUT SERIOUS COMORBIDITY WITH BODY MASS INDEX (BMI) OF 35.0 TO 35.9 IN ADULT, UNSPECIFIED OBESITY TYPE: ICD-10-CM

## 2024-12-19 RX ORDER — SEMAGLUTIDE 2.4 MG/.75ML
2.4 INJECTION, SOLUTION SUBCUTANEOUS WEEKLY
Qty: 9 ML | Refills: 1 | Status: SHIPPED | OUTPATIENT
Start: 2024-12-19

## 2024-12-19 NOTE — TELEPHONE ENCOUNTER
Endocrine Refill protocol for weight management    Protocol Criteria:  PASSED Reason: N/A    If below requirement is met, send a 90-day supply with 1 refill per provider protocol.    Verify appointment with Endocrinology completed in the last 6 months or scheduled in the next 3 months.    Last completed office visit:7/25/2024 Hamida Son MD   Next scheduled Follow up:   Future Appointments   Date Time Provider Department Center   12/19/2024 12:40 PM Deidra Roldan PA [facility]   12/30/2024  8:15 AM Elena Moore DO [facility]AP[facility] [place]   1/23/2025  4:45 PM Hamida Son MD [facility]   1/29/2025  9:00 AM Nabil Hinojosa MD [facility]

## 2024-12-30 ENCOUNTER — OFFICE VISIT (OUTPATIENT)
Dept: NEUROLOGY | Facility: CLINIC | Age: 29
End: 2024-12-30
Payer: COMMERCIAL

## 2024-12-30 VITALS
SYSTOLIC BLOOD PRESSURE: 122 MMHG | RESPIRATION RATE: 16 BRPM | WEIGHT: 213.81 LBS | DIASTOLIC BLOOD PRESSURE: 72 MMHG | HEART RATE: 100 BPM | BODY MASS INDEX: 36 KG/M2

## 2024-12-30 DIAGNOSIS — F41.9 ANXIETY: ICD-10-CM

## 2024-12-30 DIAGNOSIS — E53.8 B12 DEFICIENCY: ICD-10-CM

## 2024-12-30 DIAGNOSIS — G43.109 MIGRAINE WITH AURA AND WITHOUT STATUS MIGRAINOSUS, NOT INTRACTABLE: Primary | ICD-10-CM

## 2024-12-30 PROCEDURE — 3074F SYST BP LT 130 MM HG: CPT | Performed by: OTHER

## 2024-12-30 PROCEDURE — 3078F DIAST BP <80 MM HG: CPT | Performed by: OTHER

## 2024-12-30 PROCEDURE — 99213 OFFICE O/P EST LOW 20 MIN: CPT | Performed by: OTHER

## 2024-12-30 RX ORDER — GALCANEZUMAB 120 MG/ML
1 INJECTION, SOLUTION SUBCUTANEOUS
Qty: 1 ML | Refills: 11 | Status: SHIPPED | OUTPATIENT
Start: 2024-12-30

## 2024-12-30 NOTE — PROGRESS NOTES
[facility] - [place]   Neurology; follow up  CLINIC VISIT      Essence Borja Patient Status:  No patient class for patient encounter    1995 MRN [id number]   Location [facility], [facility], [place] PCP Ruby Pedro MD     REASON FOR THE VISIT:  Chief Complaint   Patient presents with    Neurologic Problem    Migraine       HISTORY OF PRESENT ILLNESS:  Essence Borja is a(n) 29 year old, right handed,  female who presents for migraine management.   She has migraine HA for long time since age of 6 years old, her mother said she was complains HA and nausea a lot,  she was sick a lot, nausea, HA, when she grows old it was less frequent for a while, then last few years, more often her HA, it started in her neck, moved to front and temporal region, she has sick feeling, BRUNO is pounding pain, with sweat, nausea with HA, can be moderate to severe, 9-10/10, it made her cry,  HA was three times a week, Imitrex was given, initially helped, not anymore, not working anymore, she tried Excedrin, Fioricet, flexeril, meclizine, Reglan in past, none of those helped,   She has anxiety, was put on Zoloft, well control of anxiety, she was given Topamax 100 mg at bedtime last six years for prevention, she tolerated it. She has many brain imaging test in past always normal,   She has dizziness, vertigo, poor balance, blurred vision when her HA is severe, she wakes up with HA in am, lasting whole day, if she catches sooner enough to take Imitrex she feels relief few hours later, she feels red onion triggered HA, Her sleep is fine, Her stress level is same, she has anxiety, stressed out easily,   BRUNO can be 9/10, she deangelo and nausea and vomiting, no weakness or numbness. She is here for HA management. She was last seen on 2022 for migraine management. I give her Topamax, upgraded to 50 mg am, 100 mg pm, she feels better in her HA, less frequent but still 2-3 times a week, she tolerated  Topamax well, she feels Maxalt, Imitrex did not help acute HA.   Interim  Eletriptan doesn't work as well as sumatriptan. Topamax 50/150mg has made migraines less frequent. Wakes up with migraines, occipital left sided. Still getting very many headaches per month, at times 2-3 times a week, other times more. Overall less intense.  Interim  Since last visit, having 15 migraines per month. Still having left sided neck pain, trapezius and into occipital area. Did chiropractor treatment a year ago. Did not do the PT that was ordered. Takes flexeril occasionally. Nurtec was not helpful. She is a /. Went to a TMJ dentist, got a nightguard.  Interim  Since starting Emgality 4 months ago, migraines are much better. Having them 3-4 times a month. Jaw does not seem to be clenching at night. Neck pain is better. Wearing nightguard, got an additional one. In the last 6 months, patient has noticed is more sluggish, forgetful. Has a hard time telling a story often, thoughts are jumbled. Ran out of topamax for one month, migraines did not improve. Sleeps at least 6 hours nightly.   Interim  Since Emgality migraines are improved. Stopped sertraline and started lexapro. Sluggishness and decreased concentration improved since off Topamax. Imitrex 100mg aborts headache. Headaches 5 times a month now.   Interim  Since last visit, getting a migraine once every 2 weeks, responds to iburpofen 200mg. Taking B12. Not taking anything for anxiety, feels it is better.             Meds tried  Amitriptyline in the past, not effective  Topamax 50/150 not effective  Nurtec- not effective  Eletriptan, rizatriptan, fiorcet, excedrin- not effective    Component      Latest Ref Rng 3/20/2024   Vitamin B12      211 - 911 pg/mL 594          Component      Latest Ref Rng 8/10/2023 9/8/2023   Vitamin B12      193 - 986 pg/mL 241     TSH      0.358 - 3.740 mIU/mL  1.370    T4,Free (Direct)      0.8 - 1.7 ng/dL  1.1            PAST  MEDICAL HISTORY:  Past Medical History:    Acne    I was about 16 years old when it started. Got worse in college ~2018    Allergic rhinitis    since childhood    Anxiety    Chronic migraine    Contact allergic reaction    Environmental allergies    Childhood    Esophageal reflux    had Colonoscopy    Hypothyroidism    Irritable bowel syndrome    runs in family    Kidney stone    Latent tuberculosis by blood test    Obesity    Scoliosis    slight scoliosis    TMJ (dislocation of temporomandibular joint)       PAST SURGICAL HISTORY:  Past Surgical History:   Procedure Laterality Date    Colonoscopy  2021    Other surgical history  2017    wisdom teeth    Judsonia teeth removed         FAMILY HISTORY:  family history includes Dementia in her maternal grandmother; Diabetes in her mother; Heart Disorder in her maternal grandfather and sister; Obesity in her maternal grandfather, maternal grandmother, mother, and paternal grandmother.    SOCIAL HISTORY:   reports that she has quit smoking. Her smoking use included cigarettes. She has never been exposed to tobacco smoke. She has never used smokeless tobacco. She reports that she does not currently use alcohol. She reports that she does not use drugs.    ALLERGIES:  Allergies   Allergen Reactions    Doxycycline NAUSEA AND VOMITING    Hydrocodone NAUSEA AND VOMITING     Narco    Tree Nuts OTHER (SEE COMMENTS)     Abdominal cramps       MEDICATIONS:  Current Outpatient Medications   Medication Sig Dispense Refill    WEGOVY 2.4 MG/0.75ML Subcutaneous Solution Auto-injector Inject 0.75 mL (2.4 mg total) into the skin once a week. 9 mL 1    tretinoin 0.025 % External Cream Apply pea sized amount to the full face at night, making sure to avoid the eyes and lips. Wash off in the morning. Start using every other night and then progress to every night as tolerated. Apply moisturizer nightly to avoid excessive drying 20 g 0    Galcanezumab-paula (EMGALITY) 120 MG/ML Subcutaneous  Solution Auto-injector ADMINISTER 1 ML UNDER THE SKIN EVERY 30 DAYS 1 mL 11    levothyroxine 150 MCG Oral Tab Take 1 tablet (150 mcg total) by mouth daily. 90 tablet 3    SUMAtriptan Succinate 100 MG Oral Tab TAKE AT ONSET; REPEAT ONCE AFTER 2 HOURS MAXIMUM DAILY DOSE IS 2 TABLETS 9 tablet 2    Cyanocobalamin (VITAMIN B-12 ER) 2000 MCG Oral Tab CR Take 1 tablet (2,000 mcg total) by mouth daily.      Fluocinonide 0.05 % External Solution Use twice daily Monday-Friday with flares on scalp. Do not use on face. 60 mL 5    ketoconazole 2 % External Shampoo Use 2-3 times weekly. Lather into scalp and leave on for 5 minutes before washing off. 120 mL 11    Tretinoin 0.05 % External Cream Apply pea sized amount to the full face at night, making sure to avoid the eyes and lips. Wash off in the morning. Start using every other night and then progress to every night as tolerated. Apply moisturizer nightly to avoid excessive drying 45 g 0    clindamycin 1 % External Lotion Apply twice daily with flares of pimples on body 60 mL 11    LEVOCETIRIZINE 5 MG Oral Tab TAKE 1 TABLET(5 MG) BY MOUTH EVERY EVENING 90 tablet 1    azelastine 0.1 % Nasal Solution by Nasal route 2 (two) times daily.      Vitamin D3, Cholecalciferol, (VITAMIN D3) 125 MCG (5000 UT) Oral Cap Take 1 capsule (5,000 Units total) by mouth daily.      MULTIPLE VITAMIN OR Take 1 tablet by mouth daily.           REVIEW OF SYSTEMS:    GENERAL HEALTH:  feels well, calm, normal appetite,   EYES: no visual complaints or deficits  HEENT: denies nasal congestion, sinus pain or sore throat; no  hearing loss;  RESPIRATORY: denies shortness of breath, wheezing or cough   CARDIOVASCULAR: denies chest pain, no palpitations   GI: denies nausea, vomiting, constipation, diarrhea; no rectal bleeding; no heartburn  GENITAL/: no dysuria, urgency or frequency;  no hernias; no nocturia  GYNE/: no dysuria, urgency or frequency; no urinary incontinence  MUSCULOSKELETAL: no joint  complaints in  upper or lower extremities  NEURO: see HPI;  PSYCHE: no symptoms of depression or anxiety  HEMATOLOGY:  denies bruising or excessive bleeding  ENDOCRINE: denies excessive thirst or urination; denies unexpected wt gain or wt loss  ALLERGY/IMM.: denies food or seasonal allergies      PHYSICAL EXAMINATION:  VITAL SIGNS: /72   Pulse 100   Resp 16   Wt 213 lb 12.8 oz (97 kg)   LMP 10/26/2023   BMI 35.58 kg/m²    Body mass index is 35.58 kg/m².  General:  Patient is a 29 year old female in no acute distress.  appearance: Normal developed and well nourished , in no acute stress;  HEENT:  Normal conjunctiva, no abnormal secretion, normal structure of skull, no tenderness  Neck supple,  No carotid bruit,  thyroid normal  Lungs are clear to auscultation  Heart: normal SR, no murmur  Extremities:  No edema or cyanosis, radial and pedal pulse is normal.  Skin:  No unusual lesions    Neurologic Examination:  Mental status: he is awake, alert, oriented to time, name and place, Mentally appropriate  for age;  Language and speech: language is intact in expression and comprehension, no dysarthria, voice is normal in volume, follow commends well;  Memory and comprehension:  MMSE is normal,   Cranial Nerves       CN II:  Visual fields full, Pupils equal and reactive, Fundi normal       CN III, IV, VI:  Horrizontal and vertical movements normal.  Normal pursuit and saccades.                            No nystagmus, no ptosis       CN V: Masseters strong, Facial sensations normal,        CN  VII:  Symmetric facial movement       CN VIII:  Normal hearing       CN IX, XI:  Normal Gag, uvula palate midline       CN XII:  SCM strong, equal shoulder shrug  Motor:  No drift, rapid finger movement symmetric. 5/5 in all limbs with normal tone. No tremor of any kind;   Sensory;  Intact to light touch, temperature, vibration and proprioception;  DTRs;  Symmetric in both arms and legs, including biceps, triceps, knees,  ankles,  Babinski sign is negative;  Coordination: Normal FTN and HTS;   Gait: Normal based,  Romberg sign is negative, Tandem walk is normal        LABS:     Reviewed with patient         Impression/Plan:  Encounter Diagnoses   Name Primary?    Migraine with aura and without status migrainosus, not intractable Yes    B12 deficiency     Anxiety        Refractory migraines- main triggers of chronic neck and TMJ, anxiety  Refractory to Elavil and topamax  Significant improvement with Emgality  Continue Emgality 120mg monthly  Abortive take sumatriptan 100mg or ibuprofen    B12 deficiency- continue B12 1000mcg daily    Anxiety- improved  Using self help techniques    Requested Prescriptions      No prescriptions requested or ordered in this encounter       We discussed in depth regarding the diagnosis, prognosis, treatment.  The patient was given ample opportunity to ask questions. All questions and concerns were addressed.     Nelly Moore,   Neurology and Neuromuscular medicine  [facility]

## 2024-12-30 NOTE — PATIENT INSTRUCTIONS
Refill policies:    Allow 2-3 business days for refills; controlled substances may take longer.  Contact your pharmacy at least 5 days prior to running out of medication and have them send an electronic request or submit request through the “request refill” option in your [facility] account.  Refills are not addressed on weekends; covering physicians do not authorize routine medications on weekends.  No narcotics or controlled substances are refilled after noon on Fridays or by on call physicians.  By law, narcotics must be electronically prescribed.  A 30 day supply with no refills is the maximum allowed.  If your prescription is due for a refill, you may be due for a follow up appointment.  To best provide you care, patients receiving routine medications need to be seen at least once a year.  Patients receiving narcotic/controlled substance medications need to be seen at least once every 3 months.  In the event that your preferred pharmacy does not have the requested medication in stock (e.g. Backordered), it is your responsibility to find another pharmacy that has the requested medication available.  We will gladly send a new prescription to that pharmacy at your request.    Scheduling Tests:    If your physician has ordered radiology tests such as MRI or CT scans, please contact Central Scheduling at [phone] right away to schedule the test.  Once scheduled, the [facility] Centralized Referral Team will work with your insurance carrier to obtain pre-certification or prior authorization.  Depending on your insurance carrier, approval may take 3-10 days.  It is highly recommended patients assure they have received an authorization before having a test performed.  If test is done without insurance authorization, patient may be responsible for the entire amount billed.      Precertification and Prior Authorizations:  If your physician has recommended that you have a procedure or additional testing performed the [facility]  Centralized Referral Team will contact your insurance carrier to obtain pre-certification or prior authorization.    You are strongly encouraged to contact your insurance carrier to verify that your procedure/test has been approved and is a COVERED benefit.  Although the [facility] Centralized Referral Team does its due diligence, the insurance carrier gives the disclaimer that \"Although the procedure is authorized, this does not guarantee payment.\"    Ultimately the patient is responsible for payment.   Thank you for your understanding in this matter.  Paperwork Completion:  If you require FMLA or disability paperwork for your recovery, please make sure to either drop it off or have it faxed to our office at [phone]. Be sure the form has your name and date of birth on it.  The form will be faxed to our Forms Department and they will complete it for you.  There is a 25$ fee for all forms that need to be filled out.  Please be aware there is a 10-14 day turnaround time.  You will need to sign a release of information (ALCON) form if your paperwork does not come with one.  You may call the Forms Department with any questions at [phone].  Their fax number is [phone].

## 2025-01-03 ENCOUNTER — MED REC SCAN ONLY (OUTPATIENT)
Dept: DERMATOLOGY CLINIC | Facility: CLINIC | Age: 30
End: 2025-01-03

## 2025-01-07 ENCOUNTER — VIRTUAL PHONE E/M (OUTPATIENT)
Dept: UROLOGY | Facility: HOSPITAL | Age: 30
End: 2025-01-07
Attending: PHYSICIAN ASSISTANT
Payer: COMMERCIAL

## 2025-01-07 DIAGNOSIS — N39.43 POST-VOID DRIBBLING: Primary | ICD-10-CM

## 2025-01-07 DIAGNOSIS — N81.84 PELVIC MUSCLE WASTING: ICD-10-CM

## 2025-01-07 DIAGNOSIS — R10.2 PELVIC PAIN: ICD-10-CM

## 2025-01-07 DIAGNOSIS — N39.3 FEMALE STRESS INCONTINENCE: ICD-10-CM

## 2025-01-07 NOTE — PROGRESS NOTES
This visit is being conducted as a televisit with patient's consent.  Patient is in a safe, private environment for televisit, provider located in the office setting.    Visit for f/u of PFPT    Pt completed 7 sessions, last on 11/27/24  Reports 20-30% improvement in CARMEN and PV dribbling    Rare UUI, some improvement in urinary frequency    Reports pelvic pain  Recent dx of PCOS, on OCPs, follows with GYN    Bowels irreg, alternating between constipation & loose stool  Seeing GI later this month     Denies any current signs or symptoms of UTI      Impression/Plan:    ICD-10-CM    1. Post-void dribbling  N39.43       2. Female stress incontinence  N39.3       3. Pelvic muscle wasting  N81.84       4. Pelvic pain  R10.2           Discussion Items:   Discussed management options for CARMEN including expectant management, pelvic floor PT, pessary, and surgery (once done with childbearing).    Discussed return to office for pelvic exam.    Treatment Plan:  F/u for in office visit for repeat pelvic exam  Bowel regimen  Bladder diet/drill  Pelvic floor exercises  Call with s/sx of UTI    All questions answered  She understands and agrees to plan    Return for CARMEN, PV dribbling, pelvic pain.  (in office)  Deidra Roldan PA-C    Note to patient: The 21st Century Cures Act makes medical notes like these available to patients in the interest of transparency.  However, be advised this is a medical document.  It is intended as peer to peer communication.  It is written in medical language and may contain abbreviations or verbiage that are unfamiliar.  It may appear blunt or direct.  Medical documents are intended to carry relevant information, facts as evident, and the clinical opinion of the practitioner.

## 2025-01-14 ENCOUNTER — OFFICE VISIT (OUTPATIENT)
Dept: UROLOGY | Facility: HOSPITAL | Age: 30
End: 2025-01-14
Attending: PHYSICIAN ASSISTANT
Payer: COMMERCIAL

## 2025-01-14 VITALS — HEIGHT: 65 IN | BODY MASS INDEX: 35.49 KG/M2 | WEIGHT: 213 LBS | RESPIRATION RATE: 18 BRPM

## 2025-01-14 DIAGNOSIS — R39.15 URINARY URGENCY: ICD-10-CM

## 2025-01-14 DIAGNOSIS — R10.2 PELVIC PAIN: ICD-10-CM

## 2025-01-14 DIAGNOSIS — R35.0 URINARY FREQUENCY: ICD-10-CM

## 2025-01-14 DIAGNOSIS — N39.3 FEMALE STRESS INCONTINENCE: ICD-10-CM

## 2025-01-14 DIAGNOSIS — N39.43 POST-VOID DRIBBLING: Primary | ICD-10-CM

## 2025-01-14 DIAGNOSIS — N81.84 PELVIC MUSCLE WASTING: ICD-10-CM

## 2025-01-14 DIAGNOSIS — N94.10 DYSPAREUNIA, FEMALE: ICD-10-CM

## 2025-01-14 DIAGNOSIS — R39.14 FEELING OF INCOMPLETE BLADDER EMPTYING: ICD-10-CM

## 2025-01-14 PROCEDURE — 99212 OFFICE O/P EST SF 10 MIN: CPT

## 2025-01-14 PROCEDURE — 51701 INSERT BLADDER CATHETER: CPT

## 2025-01-14 PROCEDURE — 87086 URINE CULTURE/COLONY COUNT: CPT | Performed by: PHYSICIAN ASSISTANT

## 2025-01-14 RX ORDER — DIAZEPAM 10 MG/1
10 TABLET ORAL NIGHTLY
Qty: 30 TABLET | Refills: 2 | Status: SHIPPED | OUTPATIENT
Start: 2025-01-14

## 2025-01-14 NOTE — PROGRESS NOTES
Patient presents to follow up PFPT    Attended 7 sessions  Reports 30% improvement  Has lost 30 lbs in the past 5-6 months and thinks this has helped some with PF symptoms    Reports post void dribbling > CARMEN > pelvic pain  +hx of dyspareunia, not currently active    Bowels irreg alternating between loose stool & constipation  Seeing GI later this month    Urogynecology Summary:  Urogynecology Summary  Prolapse: No  CARMEN: Yes (Reports leakage of urine with sneeze. Reports 30% improvememt with PFPT.)  Urge Incontinence: No  Nocturia Frequency: 1 (Report 0 to 1 times during the night.)  Frequency: 1 - 2 hours (Reports timing trips to the bathroom which is helpful.)  Incomplete emptying: Yes (Reports sense of incomplete emptying and shifting postions at times to void.)  Constipation: Yes (Hx: of IBS followed by GI. Dr. Hinojosa, appointment scheduled for 01/29/25. Bowel regimen reviewed. Handouts provided.)  Wears pad day?: 0  Wears Pad Night?: 0  Activities are limited by UI/POP?: No  Currently Sexually Active: Yes  Avoids sexual activity due to:  (Reports dysparunia.)    Vitals:  Resp 18   Ht 65\"   Wt 213 lb (96.6 kg)   LMP 10/26/2023   BMI 35.45 kg/m²     GENERAL EXAM:  GENERAL: alert & oriented, NAD  HEENT: NC/AT, sclera without injection  SKIN: no rashes  LUNGS:  without increased respiratory effort  ABDOMEN: soft, non-tender, non-distended, no masses appreciated  EXT: no edema    PELVIC EXAM: Nicki RN, present for exam as a chaperone  Ext. Gen: no atrophy, no lesions  Urethra: no atrophy, nontender  Bladder: some fullness, nontender  Vagina: no atrophy  Cervix: no bleeding, no lesions, nontender  Uterus: +mobile  Adnexa: no masses, nontender  Perineum: nontender  Anus: wnl  Rectum: defer     PELVIS FLOOR NEUROMUSCULAR FUNCTION:  Strength:  1, Unable to hold greater than 3 sec, Increased tone, and Unable to relax  Perineal Sensation:  Normal        PELVIC SUPPORT:  Eola:  0  Ant:  0  Post:  0  CST:   negative  UVJ: not hypermobile    Pt voided 15 mL in the privacy of the bathroom (reports voiding just prior to arrival)  After obtaining verbal consent from the patient, sterile technique used to prep urethra and collect urine.  PVR 5 mL.  Sent for culture.    Impression/Plan:    ICD-10-CM    1. Post-void dribbling  N39.43 Urine Culture, Routine     PHYSICAL THERAPY - External Location      2. Pelvic pain  R10.2 PHYSICAL THERAPY - External Location     diazePAM 10 MG Oral Tab      3. Female stress incontinence  N39.3 Urine Culture, Routine     PHYSICAL THERAPY - External Location      4. Dyspareunia, female  N94.10 PHYSICAL THERAPY - External Location      5. Feeling of incomplete bladder emptying  R39.14       6. Pelvic muscle wasting  N81.84       7. Urinary urgency  R39.15       8. Urinary frequency  R35.0           Discussion Items:   Discussed revisiting PFPT with addition of vaginal valium (discussed risks/benefits of med)    Discussed identification and avoidance of potential bladder irritants    Discussed management options for CARMEN including expectant management, pelvic floor PT, pessary, and surgery (once completed with childbearing)  -- hold pessary trial until pelvic pain improved      Treatment Plan:  Urine collected via straight cath & sent for Cx, follow result, tx if +  Start vaginal valium 10 mg nightly + resume PFPT  Bowel regimen  Bladder diet/drill  Pelvic floor exercises  Call with s/sx of UTI    All questions answered  She understands and agrees to plan    Return in about 3 months (around 4/14/2025) for PFPT f/u.    Deidra Roldan PA-C    Note to patient: The 21st Century Cures Act makes medical notes like these available to patients in the interest of transparency.  However, be advised this is a medical document.  It is intended as peer to peer communication.  It is written in medical language and may contain abbreviations or verbiage that are unfamiliar.  It may appear blunt or direct.   Medical documents are intended to carry relevant information, facts as evident, and the clinical opinion of the practitioner.

## 2025-01-14 NOTE — PATIENT INSTRUCTIONS
Name Address [facility] Phone/Fax Contact   [facility] [address]  [address]   [facility]   [address] PH: [phone]  FAX: [phone]   Shannan Pfeiffer     [facility] [address]   [facility]   [address] PH: [phone]  FAX: [phone]    [facility]   [address]   [place]   [place]   [address]   PH: [phone]    [facility] [address]   [facility]   [address] PH: [phone]  FAX: [phone]   Karen Barroso   [facility]   [address]    [facility]    [address] PH: [phone]  FAX: [phone] Brendan Shah [facility]   [facility] [place]   [address]     [facility]     [address]   PH: [phone]  FAX: [phone]   Mavis Bolton   [facility] [address]  [address]   [facility]   [address] PH: [phone]  FAX: [phone]   Mallika Hogan   [facility]    [address]   [facility]   [address]   PH: [phone]   Alberto Childs     [facility] [address]   [facility]   [address] PH: [phone]  FAX: [phone] [facility]  Physical Therapy Clinic   [address]   [facility]   [address] PH: [phone]  FAX: [phone]        [facility]   [address]   [facility]   [address] PH: [phone]  FAX: [phone]   Shannon Pinto   [facility] [address]  [address]   [facility]   [address] PH: [phone]  FAX: [phone]   Maria Luisa Feldman   [facility]   [address]   [facility]   [address] PH: [phone]  FAX: [phone] Erin Cee  [facility] [address] [place] [address] Dr. Rhodes  None 300   [facility]   [address] PH: [phone]  FAX: [phone] Maria Luisa Galvan  Ruby Dandre   [facility] [address]   [facility]   [address] PH: [phone]  FAX: [phone]   Diana Shah     [facility] [address]   [facility]   [address] PH: [phone]  FAX: [phone]   Delaney Beebe   [facility] [address]   [facility]   [address] PH: [phone]  FAX: [phone]   Yumiko Jessica     [facility] [address] [facility]   [address] PH: [phone]  FAX: [phone]      [facility]   [address] [facility]   [address]   PH: [phone]   Shanique Barrett     [facility] [address] [facility]   [address] PH: [phone]  FAX: [phone]   [facility] [address] [facility]   [address] PH: [phone]  FAX: [phone] Arely Mckenzie [facility] [address] [facility]  [address]   [facility]   [address]   PH: [phone] Danica Rivera [facility] [address] [facility]   [address] PH: [phone]  FAX: [phone]    [facility]   [address] [facility]   [address] PH: [phone]  FAX: [phone]   Rosalie Arredondo     [facility] [address] [facility]   [address] PH: [phone]  FAX: [phone]      [facility]   [address] [facility]   [address] PH: [phone]  FAX: [phone]    [facility] [address]  [address]   [facility]   [address] PH: [phone]  FAX: [phone]   Alan Morales   [facility]   [address] [facility]   [address] PH: [phone]  FAX: [phone]   [facility] Eubanks [facility] [address] [place] [address] Dr. Morgan. 110   [facility]    [address] PH: [phone]  FAX: [phone]   Edilia Pack   [facility]   [address]   [place]   [place]   [address] PH: [phone]  FAX: [phone] Ricarda Whitneystanislawbenji Aryan   [facility]   [address]   [facility]   [address] PH: [phone]  FAX: [phone]   [facility]   [address]   [facility]   [address] PH: [phone]  FAX: [phone] [facility]     [facility]   [address] [facility]   [address] PH: [phone]  FAX: [phone]      [facility]   [address] [facility]   [address] PH: [phone]  FAX: [phone] Mayela Rodriguez [facility]   [address] [facility]   [address]   PH: [phone] Uzma Eagle   [facility]   [address]   [facility]   [address] PH: [phone]  FAX: [phone] Shannon [facility]   [address]   [place]   [place]   [address] PH: [phone]  FAX: [phone]   Valeria Womack   [facility]   [address]   [facility]   [address]   PH: [phone]    [facility] [address]  Lower Level   [place]   [place]   [address]   PH: [phone]      [facility]   [address] [place]   [place]   [address] PH: [phone]  FAX: [phone]      [facility] [address]  [address]   [facility]   [address] PH: [phone]  FAX: [phone]   Ellyn Merino   [facility] [address]   Wisam LACKEY   [address] PH: [phone]  FAX: [phone]   Ashley Mueller     [facility] [address] [place] [address].  [address]   Wisam LACKEY   [address]   PH: [phone] Sherita Barrientoslife  Physical Therapy [address]   [place]   [address] PH: [phone]  FAX: [phone]   Deidra Tamez   [facility]   [address]   [facility]   [address] PH: [phone]  FAX: [phone]   Otilia Park     [facility] [address] [place]  [address]   [facility]   [address] PH:       [facility]   [address] [facility]   [address] PH: [phone]  FAX: [phone]    [facility] [address]. 2nd Floor   [facility]   [address]   PH: [phone]    [facility] [address]  [address]   [facility]   [address] PH: [phone]  FAX: [phone] Dorota Wan   [facility]   [address] [facility]   [address] PH: [phone]  FAX: [phone] Love Lopez     [facility]   [address] [facility]   [address] PH: [phone]  FAX: [phone]      [facility]   [address] [facility]   [place]   [address] PH: [phone]  FAX: [phone]    [facility] [address]  [address]   [place]   [address] PH: [phone]  FAX: [phone] Janay Waltoninal     [facility] [address]   [facility]   [address] PH: [phone]  FAX: [phone]   Love Santiago   [facility] Rehabilitation [address] [place] [address] DrDiana [address]   [facility]   [address] PH: [phone]  FAX: [phone] Carol [facility] [address]  [address]   [facility]   [address] PH: [phone]  FAX: [phone] Ijeoma Collins  [facility]   [address] [place]   [place]   [address] PH: [phone]  FAX: [phone]   [facility] [address] [place] [address] 121   [place]   [place]   [address] PH:  [phone]  FAX: [phone]   Maria Luisa Simon     [facility] [address]  [address]   [facility]   [address]   PH: [phone]   Della Mann   [facility] [address]  [address]   [facility]   [address] PH: [phone]  FAX: [phone] Ildefonso Lopez     [facility] [address]   [facility]   [address] PH: [phone]  FAX: [phone]   [facility]   [address]   [facility]   [address] PH: [phone]  FAX: [phone]   Jammie Gutierrez     [facility] [address]   [facility]   [address] PH: [phone]  FAX: [phone]    [facility] [address]   [facility]   [address] PH: [phone]  FAX: [phone]      [facility]   [address]   [facility]   [address] PH: [phone]  FAX: [phone]    [facility]   [address]   [facility]   [address] PH: [phone]  FAX: [phone] Ashley Dejesus     [facility]   [address]   [facility]   [address] PH: [phone]  FAX: [phone]   Irina Villalobos   [facility]   [address]   [facility]   [address] PH: [phone]  FAX: [phone] Ijeoma Alexander     [facility]   [address]   [facility]   [address] PH: [phone]  FAX: [phone]      [facility] [address] [facility]   [facility]   [address] PH: [phone]  FAX: [phone] [facility]   [facility] [address] [address] [place] [address]. A [address]   [facility]   [address] PH: [phone]  FAX: [phone] Tenisha Gusman Vanessa     [facility]   [address] [facility].   [facility]   [address] PH:  [phone]  FAX: [phone]    [facility] [address]  [address]   [facility]   [address] PH: [phone]  FAX: [phone]   Sharee Huertas   [facility] [address]   [facility]   [address] PH: [phone]  FAX: [phone]   [facility] [address]  [address]   [facility]   [address]   PH: [phone]      [facility]   [address] [place]   [place]   [address] PH: [phone]  FAX: [phone]   Hamida Apple   [facility] [address]  [address]   [facility]   [address] PH: [phone]  FAX: [phone] Ekaterina Meyer   [facility] Clinic [address]   [facility]   [address] PH: [phone]  FAX: [phone]   Ashley Cid     [facility] [address]   [facility]   [address] PH: [phone]  FAX: [phone]   Shalonda Sultana   [place] [facility] [address]   [facility]   [address]   PH: [phone]      [facility] [address]   [facility]   [address] PH: [phone]  FAX: [phone]    [facility] [address]  [address]   [facility]    [address] PH: [phone]  FAX: [phone] Hailee Lehman   [facility] [address]  [address]   [facility]   [address] PH: [phone]  FAX: [phone]   Shona Fung   [facility] [address]   [facility]   [address] PH: [phone]  FAX: [phone]   Hunter Barksdale

## 2025-01-23 ENCOUNTER — TELEPHONE (OUTPATIENT)
Dept: FAMILY MEDICINE CLINIC | Facility: CLINIC | Age: 30
End: 2025-01-23

## 2025-01-23 ENCOUNTER — OFFICE VISIT (OUTPATIENT)
Dept: ENDOCRINOLOGY CLINIC | Facility: CLINIC | Age: 30
End: 2025-01-23
Payer: COMMERCIAL

## 2025-01-23 VITALS
DIASTOLIC BLOOD PRESSURE: 88 MMHG | WEIGHT: 204 LBS | BODY MASS INDEX: 33.99 KG/M2 | HEART RATE: 111 BPM | HEIGHT: 65 IN | SYSTOLIC BLOOD PRESSURE: 132 MMHG

## 2025-01-23 DIAGNOSIS — E03.9 HYPOTHYROIDISM, UNSPECIFIED TYPE: Primary | ICD-10-CM

## 2025-01-23 DIAGNOSIS — E88.810 METABOLIC SYNDROME: ICD-10-CM

## 2025-01-23 DIAGNOSIS — E66.9 OBESITY (BMI 30-39.9): ICD-10-CM

## 2025-01-23 DIAGNOSIS — E06.3 HYPOTHYROIDISM DUE TO HASHIMOTO'S THYROIDITIS: Primary | ICD-10-CM

## 2025-01-23 NOTE — PROGRESS NOTES
Name: Essence Borja  Date: 1/23/2025    Referring Physician: Ruby Pedro    Chief Complaint   Patient presents with    Hypothyroidism     Follow-Up, Questions       HISTORY OF PRESENT ILLNESS   Essence Borja is a 29 year old female who presents for   Chief Complaint   Patient presents with    Hypothyroidism     Follow-Up, Questions     30 y/o F presents for follow up evaluation of hypothyroidism.  She was diagnosed at approximately 17-18 years old followed by primary care.  She was evaluated due to weight gain prior to finding thyroid diagnosis.  She was started on Levothyroxine therapy since that time.        She was then evaluated by \"wellness\" Physician a year ago who gave option of possible Hashimotos Thyroiditis.      She is currently maintained on Levothyroxine 137mcg PO daily, taking medication in AM and waiting 30-60 min before eating.     +significant weight gain approx 80lbs in the past few years.  +Cold intolerance. +fatigue, +lack of motivation. +night sweats. +somnolence. Hair loss stable.  No menstrual cycles secondary to Depo Injection.  No significant hirsutism.     Compression Symptoms:   Dysphagia: Occ  Dyspnea: No  Voice Change: No  Anterior Neck Pain: No     No family h/o thyroid disease     2/2023  Since last visit increase the dose of phentermine but weight is stable.  Normal energy level.  She is taking Levothyroxine 137mcg PO daily, taking medication in AM and waiting 30-60 min before eating.  Of note during visit she mentions that she has not had cycle in the past year since stopping Depo-Provera.      8/2023  Since last visit she did have a regular cycle x2.  Initially she had IUD placed but then removed 3 weeks ago. She is maintained on phentermine therapy and weight stable.  Wegovy was not covered by her insurance.     Last cycle 7/24/23; June 6/22-29; IUD removed 8/10.     1/2025  Since last visit she has been maintained on Wegovy therapy.  She has lost over 30lbs since starting  medication. Overall she is tolerating well and only one episode of emesis after family party.     She is also maintained on Levothyroxine 150mcg PO daily, taking in AM and waiting 30-60 min before eating.     No cycles secondary to Depo Provera.     REVIEW OF SYSTEMS  Eyes: no change in vision  Neurologic: no headache, generalized or focal weakness or numbness.  Head: normal  ENT: normal  Lungs: no shortness of breath, wheezing or MARTINEZ  Cardiovascular:  no chest pain or palpitations  Gastrointestinal:  no abdominal pain, bowel movement problems  Musculoskeletal: no muscle pain or arthralgia  /Gyne: no frequency or discomfort while urinating  Psychiatric:  no acute distress, anxiety  or depression  Skin: normal moisturized skin    Medications:     Current Outpatient Medications:     Galcanezumab-gnlm (EMGALITY) 120 MG/ML Subcutaneous Solution Auto-injector, Inject 1 mL into the skin every 30 (thirty) days., Disp: 1 mL, Rfl: 11    WEGOVY 2.4 MG/0.75ML Subcutaneous Solution Auto-injector, Inject 0.75 mL (2.4 mg total) into the skin once a week., Disp: 9 mL, Rfl: 1    levothyroxine 150 MCG Oral Tab, Take 1 tablet (150 mcg total) by mouth daily., Disp: 90 tablet, Rfl: 3    SUMAtriptan Succinate 100 MG Oral Tab, TAKE AT ONSET; REPEAT ONCE AFTER 2 HOURS MAXIMUM DAILY DOSE IS 2 TABLETS, Disp: 9 tablet, Rfl: 2    Vitamin D3, Cholecalciferol, (VITAMIN D3) 125 MCG (5000 UT) Oral Cap, Take 1 capsule (5,000 Units total) by mouth daily., Disp: , Rfl:     MULTIPLE VITAMIN OR, Take 1 tablet by mouth daily., Disp: , Rfl:     diazePAM 10 MG Oral Tab, Place 1 tablet (10 mg total) vaginally nightly., Disp: 30 tablet, Rfl: 2    tretinoin 0.025 % External Cream, Apply pea sized amount to the full face at night, making sure to avoid the eyes and lips. Wash off in the morning. Start using every other night and then progress to every night as tolerated. Apply moisturizer nightly to avoid excessive drying, Disp: 20 g, Rfl: 0     Cyanocobalamin (VITAMIN B-12 ER) 2000 MCG Oral Tab CR, Take 1 tablet (2,000 mcg total) by mouth daily., Disp: , Rfl:     Fluocinonide 0.05 % External Solution, Use twice daily Monday-Friday with flares on scalp. Do not use on face., Disp: 60 mL, Rfl: 5    ketoconazole 2 % External Shampoo, Use 2-3 times weekly. Lather into scalp and leave on for 5 minutes before washing off., Disp: 120 mL, Rfl: 11    Tretinoin 0.05 % External Cream, Apply pea sized amount to the full face at night, making sure to avoid the eyes and lips. Wash off in the morning. Start using every other night and then progress to every night as tolerated. Apply moisturizer nightly to avoid excessive drying, Disp: 45 g, Rfl: 0    clindamycin 1 % External Lotion, Apply twice daily with flares of pimples on body, Disp: 60 mL, Rfl: 11    LEVOCETIRIZINE 5 MG Oral Tab, TAKE 1 TABLET(5 MG) BY MOUTH EVERY EVENING, Disp: 90 tablet, Rfl: 1    azelastine 0.1 % Nasal Solution, by Nasal route 2 (two) times daily., Disp: , Rfl:      Allergies:   Allergies   Allergen Reactions    Doxycycline NAUSEA AND VOMITING    Hydrocodone NAUSEA AND VOMITING     Narco    Tree Nuts OTHER (SEE COMMENTS)     Abdominal cramps       Social History:   Social History     Socioeconomic History    Marital status: Single   Tobacco Use    Smoking status: Former     Types: Cigarettes     Passive exposure: Never    Smokeless tobacco: Never    Tobacco comments:     a few cigarettes a day, now I use a vape   Vaping Use    Vaping status: Every Day    Substances: Nicotine, Flavoring, 5%    Devices: Disposable, Pre-filled or refillable cartridge   Substance and Sexual Activity    Alcohol use: Not Currently    Drug use: Never    Sexual activity: Yes     Partners: Male   Other Topics Concern    Blood Transfusions No    Caffeine Concern Yes     Comment: coffee and soda  1 glass daily    Exercise No    Grew up on a farm No    History of tanning No    Outdoor occupation No    Breast feeding No     Reaction to local anesthetic No    Pt has a pacemaker No    Pt has a defibrillator No       Medical History:   Past Medical History:    Acne    I was about 16 years old when it started. Got worse in college ~2018    Allergic rhinitis    since childhood    Anxiety    Chronic migraine    Contact allergic reaction    Environmental allergies    Childhood    Esophageal reflux    had Colonoscopy    Hypothyroidism    Irritable bowel syndrome    runs in family    Kidney stone    Latent tuberculosis by blood test    Obesity    Scoliosis    slight scoliosis    TMJ (dislocation of temporomandibular joint)       Surgical history:   Past Surgical History:   Procedure Laterality Date    Colonoscopy  2021    Other surgical history  2017    wisdom teeth    [place] teeth removed         PHYSICAL EXAMINATION:  /88 (BP Location: Left arm)   Pulse 111   Ht 5' 5\" (1.651 m)   Wt 204 lb (92.5 kg)   LMP 10/26/2023   BMI 33.95 kg/m²     General Appearance:  Alert, in no acute distress, well developed  Eyes: normal conjunctivae, sclera.  Ears/Nose/Mouth/Throat/Neck:  normal hearing, normal speech and diffusely enlarged thyroid gland  Musculoskeletal:  normal muscle strength and tone  PV: normal pulses of carotids, pedals  Skin:  normal moisture and skin texture  Hair & Nails:  normal scalp hair     Neuro:  sensory grossly intact and motor grossly intact  Psychiatric:  oriented to time, self, and place  Nutritional:  no abnormal weight gain or loss    ASSESSMENT/PLAN:    1. Hypothyroidism   - Discussed diagnosis with patient  - Discussed nonspecific symptoms  - Continue Levothyroxine 150mcg PO daily   - Normal TFTs     2. Metabolic Syndrome  - Discussed diagnosis with patient  - No evidence of PCOS or Cushings Syndrome  -LIver US consistent with LAGUNAS  -Continue Wegovy 2.4mg subcutaneous weekly    3. LAGUNAS  - stable     4. Secondary Amenorrhea  - Restarted Depo Injection     5. Insulin Resistance  - Strong family h/o DM2  - Signs of  insulin resistance and glucose abnormality  - GLP-1 therapy as noted above    RTC  6 months     1/23/2025  Hamida Son MD

## 2025-01-25 ENCOUNTER — TELEPHONE (OUTPATIENT)
Dept: ENDOCRINOLOGY CLINIC | Facility: CLINIC | Age: 30
End: 2025-01-25

## 2025-01-25 NOTE — TELEPHONE ENCOUNTER
Current Outpatient Medications   Medication Sig Dispense Refill     Prior Authorization:    Wegovy 0.2MG/0.5ML auto-injectors:    [email or web address]  Robin:[email or web address]  Last Name:Jonh  :1995    Complete the ePA and click \"Send to Plan\" to submit to [employer].

## 2025-01-27 NOTE — TELEPHONE ENCOUNTER
Medication PA Requested: Wegovy 0.2MG/0.5ML auto-injectors:                                                          CoverMyMeds Used:  Key:[id number]   Quantity:9mL  Day Supply: 90  Sig: Inject 0.75 mL (2.4 mg total) into the skin once a week.   DX Code:E66.9

## 2025-01-29 ENCOUNTER — OFFICE VISIT (OUTPATIENT)
Facility: CLINIC | Age: 30
End: 2025-01-29
Payer: COMMERCIAL

## 2025-01-29 VITALS
TEMPERATURE: 98 F | DIASTOLIC BLOOD PRESSURE: 80 MMHG | HEART RATE: 97 BPM | HEIGHT: 65 IN | BODY MASS INDEX: 34.32 KG/M2 | WEIGHT: 206 LBS | SYSTOLIC BLOOD PRESSURE: 112 MMHG

## 2025-01-29 DIAGNOSIS — R10.13 EPIGASTRIC ABDOMINAL PAIN: ICD-10-CM

## 2025-01-29 DIAGNOSIS — K58.0 IRRITABLE BOWEL SYNDROME WITH DIARRHEA: ICD-10-CM

## 2025-01-29 DIAGNOSIS — R11.0 NAUSEA: ICD-10-CM

## 2025-01-29 DIAGNOSIS — K59.1 FUNCTIONAL DIARRHEA: Primary | ICD-10-CM

## 2025-01-29 DIAGNOSIS — R10.13 DYSPEPSIA: ICD-10-CM

## 2025-01-29 DIAGNOSIS — R15.9 INCONTINENCE OF FECES WITH FECAL URGENCY: ICD-10-CM

## 2025-01-29 DIAGNOSIS — R15.2 INCONTINENCE OF FECES WITH FECAL URGENCY: ICD-10-CM

## 2025-01-29 DIAGNOSIS — R74.8 ABNORMAL TRANSAMINASES: ICD-10-CM

## 2025-01-29 DIAGNOSIS — K21.9 GASTROESOPHAGEAL REFLUX DISEASE WITHOUT ESOPHAGITIS: ICD-10-CM

## 2025-01-29 PROCEDURE — 3074F SYST BP LT 130 MM HG: CPT | Performed by: INTERNAL MEDICINE

## 2025-01-29 PROCEDURE — 3079F DIAST BP 80-89 MM HG: CPT | Performed by: INTERNAL MEDICINE

## 2025-01-29 PROCEDURE — 99205 OFFICE O/P NEW HI 60 MIN: CPT | Performed by: INTERNAL MEDICINE

## 2025-01-29 PROCEDURE — 3008F BODY MASS INDEX DOCD: CPT | Performed by: INTERNAL MEDICINE

## 2025-01-29 NOTE — PROGRESS NOTES
** Scroll down for HPI. **    ASSESSMENT/PLAN:     29 year old woman with elevated BMI 34.3, metabolic syndrome thyroid disease hepatic steatosis who started Wegovy weight loss medication about 6 months ago.  She states that she has lost about 30 LBS to date.  Amazing news.    Ms. Borja comes in today for second opinion regarding several abdominal concerns:      Suggest:    Complex functional abdominal syndrome, IBS-diarrhea  Multiple abdominal complaints since approximately age 20 including abdominal pain, periumbilical and diffuse abdominal pain, borborygmi, loose stools variably loose-diarrhea  No significant abdominal surgical history  There is a component of postprandial diarrhea with urgency and occasional fecal incontinence, usually after larger richer meals  No blood with stools  Previous minimum tobacco smoker (7 years prior to age 29?)  Reassuring EGD/colonoscopy examinations February 2022 by Essence's report and pathology result below  Normal fecal calprotectin level 18.4 ug/g 9/17/2023 assay after ED evaluation and CT scan  Negative C. difficile assay on specimen 4/29/2023  Significant abdominal gas discomfort, flatulence  No improvement on trials of:  Metamucil psyllium, Benefiber products  Probiotics  Dicyclomine as below    Essence has participated in pelvic floor therapy  Severe adverse reaction to dicyclomine prescribed 2/1/2023, intense throat dryness, \"tapping foot\"  I recommended consideration for diet including trial of going gluten-free or FODMAP diet.  Does not take much dairy.  Also Mediterranean, anti-inflammatory diets  **Today I recommended trial of rifaximin antibiotic for IBS-diarrhea.  Prescription sent in.  Await word on coverage.  I discussed colestipol/cholestyramine bile binding resin but unclear how helpful that would be in this 29-year-old woman.  She does have occasional days of constipation which could be aggravated by the colestipol.  She is very sensitive to medications.    Could  consider repeat EGD examination and/or colonoscopy examination in the future for further evaluation as per symptoms, clinical course, future imaging findings      Severe GERD symptoms, PPI dependent     Reported reassuring EGD examination with esophageal biopsies reassuring as below February 2022  Previously failed omeprazole, famotidine, TUMS calcium carbonate  Now reports adequate control on Nexium esomeprazole 20 mg daily.  Currently purchasing OTC.  No dysphagia  Importance of finding lowest dose necessary PPI medication reviewed today.  Esomeprazole 20 mg is an excellent dose for this young healthy woman.  Concern for long-term PPI risks including osteopenia/osteoporosis reviewed.  Severe GERD symptoms above currently justify those risks.  Importance of taking calcium/vitamin D supplementation briefly reviewed.    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       BMI Readings from Last 5 Encounters:   01/29/25 34.28 kg/m²   01/23/25 33.95 kg/m²   01/14/25 35.45 kg/m²   12/30/24 35.58 kg/m²   12/02/24 35.48 kg/m²     + Metabolic syndrome, thyroid disease, Polycystic ovarian syndrome    Last CMP here 12/10/2024 shows AST 20 ALT 33 alk phosphatase 98  Prior to the Wegovy therapy, previous LFTs were significant for AST 33 ALT 68 alk phosphatase 113 on 9/8/2023, AST 40 ALT 83 alk phosphatase 111 on 6/24/2022      Hepatitis work-up:  Hepatitis B and C serologies negative 1/23/2024  MADISON and smooth muscle actin antibody  n  Iron studies n  Ceruloplasmin and alpha-1 antitrypsin n  Thyroid function has been monitored closely by Ralf Nguyen and Dr Ruby Pedro    Last liver image: CT abd/pelvis w IV contrast 4/28/2024 shows hepatic steatosis, no lesions     Absolute importance of exercise and healthy weight loss discussed.      Consideration for ultrasound elastography  every 3-5 years.  We discussed percutaneous liver biopsy in the future which I did not recommend at this point.    Consideration for the following 2 classes of diabetic meds shown to be of some benefit and fatty liver disease/LAGUNAS    TAKING glucagon-like peptide-1 receptor agonists (GLP-1 RA): Semaglutide (Wegovy/Ozempic/Rybelsus)  Started Wegovy therapy mid 2024    Sodium-glucoseco-transporter-2 inhibitors (SGLT2i): Invokana (canagliflozin), Farxiga (dapagliflozin), Jardiance (empagliflozin)      Continue to monitor LFTs every 1-2 years, follow-up with me every 1-2 years.        Abnormal colon on CT imaging 4/28/2023  Negative stool C. difficile assay 4/29/2023  Suspect acute enteritis, possibly acute ischemic colitis by distribution.  Young healthy patient would normally make ischemic colitis less likely  No antibiotics prescribed by the ED, and his fruits appears to have made a complete recovery.  Most recent colonoscopy examination was over 1 year prior to the scan  Could further evaluate in the future with repeat CT scan or another colonoscopy examination.    Peanut/nut allergy    Ingesting peanuts, food containing peanuts causes \"sharp shooting\" abdominal pains  No rash or anaphylaxis    History of severe migraine headaches     Unclear if previously requiring NSAID medications  Dramatic improvement on recent Emgality galcanezumab  Also prescribed sumatriptan    High heart rate     Vital signs repeatedly show HR 90s-116  No recalled cardiac evaluation or cardiac monitor  Unclear relation to suspected vasovagal events while on toilet as per HPI  1/29/2025    Colon cancer screening, average risk     Reassuring colonoscopy examination February 2022  Repeat colonoscopy examination as per clinical course, otherwise screening colonoscopy examination at age 42 or 47            Office visit 1/29/2025:  HPI:    Patient ID: Essence Borja is a 29 year old woman with elevated BMI 34.3, metabolic syndrome thyroid disease hepatic steatosis who started Wegovy weight loss medication about 6 months ago.  She states that she has lost about 30 LBS to date.  Amazing news.    Ms. Borja comes in today for second opinion regarding several abdominal concerns:    1.  Severe GERD and acid dyspepsia symptoms    Ms. Borja describes severe daily and nocturnal acid reflux symptoms.  This is burning pain substernal, chest and at times upper abdomen.  Stepping down to Pepcid famotidine caused severe acid symptoms.  That and previous omeprazole PPI medication were not enough.    She describes being PPI dependent.  Symptoms currently well controlled on Nexium OTC 20mg.  Distant history minimal tobacco smoking, now occasional vaping.  No dysphagia.    Reassuring EGD examination with esophageal biopsies February 2022 as below    2.  Chronic loose stools-diarrhea  Can pass up to 4-5 bowel movements per day, highly variable  Eloy diarrhea at least 2-3 days/week  Large volumes of gas, belching, flatulence  Normal/negative fecal calprotectin assay of 18.4 on 7/17/2023 as below    3. \"Stomach aches\"  Essence states \"I always have a stomachache\"  States about 75% of the time  Several different types of stomachaches, abdominal symptoms described including burning upper abdominal and chest pain; periumbilical crampy pain radiating down toward the vagina; \"rumbling\" borborygmi diffuse abdominal cramps and nausea    4.  Post prandial urgency BM/ diarrhea     Essence describes a recurring concern with severe postprandial urgency and eloy fecal incontinence fairly regularly.  Triggers appear to include  larger heavier meals, restaurant outings  Uncontrollable sudden rush of stool  No blood    5.  Presyncopal symptoms with diarrhea, bowel movements  Multiple/occasional events of presyncope, near fainting episodes while sitting on toilet, usually with diarrhea and abdominal cramping  Dizziness and lightheadedness but no eloy loss of consciousness  Baseline mild tachycardia of uncertain significance  No fainting events otherwise  No previous cardiac evaluation      Multiple complex abdominal symptoms including the above since approximately age 20.  Started Wegovy about 6 months ago.  Essence describes 30 LBS weight loss so far on the Wegovy, significant waxing and waning nausea    Numerous tried and failed medications, supplements summarized above.    Workup of multiple abdominal symptoms has included at least 2 recent CT scans copied below, abdominal and pelvic ultrasounds, and ED evaluation with CT scan 4/28/2023; EGD/colonoscopy examination with extensive biopsies February 2022.    ED evaluation April 2023 was for an acute syndrome.  CT scan showed abnormal inflammatory changes, colitis.  Sounds like something self-limited such as enteritis or ischemic colitis.  Stool C. difficile assay came back negative; symptoms resolved without antibiotics.      Wt Readings from Last 20 Encounters:   01/29/25 206 lb (93.4 kg)   01/23/25 204 lb (92.5 kg)   01/14/25 213 lb (96.6 kg)   12/30/24 213 lb 12.8 oz (97 kg)   12/02/24 213 lb 3.2 oz (96.7 kg)   09/06/24 226 lb 9.6 oz (102.8 kg)   09/06/24 232 lb (105.2 kg)   07/25/24 232 lb (105.2 kg)   07/17/24 237 lb (107.5 kg)   05/14/24 237 lb 12.8 oz (107.9 kg)   03/22/24 229 lb 12.8 oz (104.2 kg)   03/01/24 229 lb (103.9 kg)   01/25/24 226 lb (102.5 kg)   01/15/24 221 lb (100.2 kg)   01/05/24 227 lb 12.8 oz (103.3 kg)   11/14/23 220 lb 12.8 oz (100.2 kg)   11/04/23 216 lb (98 kg)   08/28/23 216 lb (98 kg)   08/10/23 212 lb 9.6 oz (96.4 kg)   07/31/23 214 lb 9.6 oz (97.3 kg)          Previous EGD examination: 2/2022  Previous colonoscopy(ies): 2/2022    [facility]    Review of Systems    ======================    9/12/2022:  US LIVER (CPT=76705)     COMPARISON: None.     INDICATIONS: Elevated LFTs     TECHNIQUE:   The liver was evaluated with gray scale and colorflow of the main vessels.       FINDINGS:  LIVER:   Increased echotexture.  Minimally enlarged.  Normal color flow.  Color flow and Doppler imaging of the portal and hepatic veins demonstrate patency and antegrade flow.   No masses or bile duct dilatation.    BILE DUCTS:   Normal.  Common bile duct measures 4.0 mm.    OTHER:   Visualized portions of gallbladder and right kidney appeared normal.  Pancreas obscured by bowel gas.          CONCLUSION:  1. Slightly enlarged fatty appearing liver.  2. Otherwise unremarkable examination.  3. Pancreas obscured.        Dictated by (CST): Kentrell Bermeo MD on 9/12/2022 at 8:01 AM        ======  [facility]     08/26/2023 CT Abdomen Pelvis WO IV Contrast    Saint Martin, Gaston A, MD - 08/26/2023    HISTORY: Left flank pain. Vaginal soreness since yesterday evening. Hematuria.    EXAM: CT of the abdomen and pelvis.    TECHNIQUE: CT images were performed plain, without oral or IV contrast protocol.   Axial reconstructed and coronal reformations images were obtained.  Radiation  dose lowering techniques included automated exposure control, and adjustment of  Kv and mA (based on size of the patient).    DOSE:  Total DLP is 1314.8 mGy-cm.    COMPARISONS: None pertinent.    FINDINGS:  Inferior heart is normal. Lung bases are clear.  The liver is enlarged and measures 21 versus CC dimension (image 62, series  601). There is diffuse decreased density, consistent with diffuse fatty liver  infiltration. There are no focal lesions or intrahepatic biliary ductal  dilatation. Mild fatty sparring adjacent to the gallbladder fossa.  Gallbladder: Visualized. Unremarkable.  The spleen is normal in size, shape  or morphology.  Adrenal glands: Within normal limits.  Pancreas: Unremarkable.  Kidneys: The right and left kidneys demonstrate normal size, shape or  morphology. The right kidney measures 11.26 m in bipolar length (image 105,  series 602). The left kidney measures 11.9 similar portable length (image 59,  series 602). No right or left-sided renal stones, perinephric stranding, cystic  or solid masses. There is prominence of the left collecting system, with  dilatation of the left renal calyces, renal pelvis and ureter. There is a  obstructing distal left ureteral stone, that measures 0.3 cm. Ureteral stone is  present at the level of the left ureterovesical junction (image 164, series 2).  The stomach, small bowel, and colon assessment is limited due to technique (no  oral or IV contrast). The appendix is visualized and is unremarkable (image 138,  series 2).  The colon is visualized and demonstrate moderate amount of stool  noted through the colon.  There are no abdominal, retroperitoneal, or pelvic masses, lymphadenopathy, free  fluid, or free air.  The aorta demonstrate normal course and caliber.  Genital organs: The uterus is anteverted.  Urinary bladder: Decompressed.  Bone evaluation is unremarkable. Mild biconcave thoracolumbar scoliosis with  L4-L5 centered mild levoscoliosis.    IMPRESSION:  1.  There is a 0.3 cm obstructing stone demonstrated at the level of the left ureterovesical junction.  2. Hepatomegaly. Diffuse fatty liver infiltration.  3. Other findings, as described.      Dictating Dr. Saint Martin, Gaston  Dictated 09/25/2022 11:23  Signing Dr. Saint Martin, Gaston  Location [id number]    Last Resulted: 09/25/22 11:30 AM  Received From: [facility]     ======    4/28/2023: CT ABDOMEN + PELVIS (CONTRAST ONLY) (CPT=74177)     COMPARISON: None.     INDICATIONS: Right lower abdominal pain, intermittent diarrhea.     TECHNIQUE: CT images of the abdomen and pelvis were obtained with non-ionic  intravenous contrast material.      FINDINGS:  LIVER: Diffuse hepatic low attenuation suggests hepatic steatosis.  No focal hepatic lesions.  GALLBLADDER: Normal size and appearance.  BILIARY: No visible dilatation or calcification.    SPLEEN: No enlargement or focal lesion.    STOMACH: No gross gastric mass, obstruction or focal abnormality.  Duodenum unremarkable.  PANCREAS: No lesion, fluid collection, ductal dilatation, or atrophy.    ADRENALS: No defined mass or abnormal enlargement.    KIDNEYS: Normal.  No mass, obstruction or calcification.    AORTA/VASCULAR:   Normal.  No aneurysm or dissection.  RETROPERITONEUM: No mass or enlarged adenopathy.    BOWEL/MESENTERY: Extensive colitis involving the left colon from the splenic flexure down to the rectosigmoid region with circumferential mucosal enhancement and decompressed lumen.  Featureless pattern involving the descending and rectosigmoid colon with a haustral pattern.  Findings consistent with nonspecific colitis inflammatory bowel disease versus antibiotic induced colitis.  Short focal area of submucosal fatty deposition within the cecum/ascending colon suggesting chronic sequela from previous colitis without acute inflammation on the right side.  Normal small bowel pattern..  ABDOMINAL WALL: Normal.  No mass or hernia.  URINARY BLADDER: No visible focal wall thickening, lesion or calculus.    PELVIC NODES: No enlarged mass or adenopathy.    PELVIC ORGANS: No visible mass.  Pelvic organs appropriate for patient age.    BONES:   Levoscoliosis and multilevel lumbar spondylosis.  LUNG BASES: No visible pulmonary or pleural disease.      CONCLUSION:  1. Diffuse colitis involving the left colon from the splenic flexure down to the rectosigmoid region.  Differential would include infectious etiologies inflammatory bowel disease versus antibiotic induced colitis.  Recommend clinical correlation.  Normal  small bowel pattern.  2. Normal retrocecal appendix.  3. No  hydroureteronephrosis or urinary calculi.  4. Diffuse hepatic steatosis.  No focal hepatic lesions.         Dictated by (CST): Kentrell Gee MD on 2023 at 5:35 PM        ======    2023: US PELVIS W EV (CPT=76856/23025)     COMPARISON: [facility], CT ABDOMEN + PELVIS (CONTRAST ONLY) (CPT=74177), 2023, 4:55 PM.     INDICATIONS: Amenorrhea     TECHNIQUE: Pelvic ultrasound using transabdominal and transvaginal technique.  A transvaginal scan was performed for further evaluation.     FINDINGS:     UTERUS:     ENDOMETRIUM: No fluid or mass in the endometrial canal.  Subcentimeter cystic foci near the lower uterine segment may represent incidental cervical nabothian cysts.  MYOMETRIUM: Homogeneous echogenicity.  No masses.       OVARIES AND ADNEXA:     RIGHT: Simple-appearing 1.6 x 1.6 x 1.7 cm right ovarian cyst.  LEFT: Normal appearance with no masses.       CUL-DE-SAC: Normal.  No free fluid or mass.    OTHER: Negative.  Bladder appears normal.       Patient Characteristics:       : 0       Para: 0  Summary:       Pelvis and Uterus:           Uterus Length: 6.89 cm           Uterus Height: 2.61 cm           Uterus Width: 4.04 cm           Endometrium Thickness: 0.51 cm       Findings:           Ovary:              Right Ovary Length: 2.95 cm              Right Ovary Height: 2.21 cm              Right Ovary Width: 2.55 cm              Left Ovary Length: 2.89 cm              Left Ovary Height: 2.53 cm              Left Ovary Width: 2.25 cm        CONCLUSION:  1. Subcentimeter cystic foci in the region of the lower uterine segment, which may represent incidental cervical nabothian cysts.  Otherwise, unremarkable sonographic assessment of the uterus and endometrium.  Endometrium measures 5 mm thickness and appears homogeneous.  2. Small simple-appearing 1.6 cm right ovarian cyst, which likely represents a physiologic dominant follicle.  No other suspicious ovarian  and/or adnexal mass.  No sonographic manifestations of polycystic ovarian syndrome.        Dictated by (CST): Bhaskar Roberts MD on 5/05/2023 at 7:35 PM     =======    PATH REPORT     02/16/2022 8:27 AM CST   [facility]   Electronically signed by Adilson Will MD on 2/16/2022 at 8:27 AM      A. Duodenum; biopsy:  -Fragments of benign duodenal mucosa with Brunner gland hyperplasia and mild patchy chronic duodenitis.  -No morphologic evidence of celiac disease.    B. Stomach, rule out H. pylori; biopsy:  -Benign gastric antral type mucosa with mild chronic inactive gastritis.  -Immunostain for H. pylori is negative.    C. Mid esophagus; biopsy:  -Benign esophageal squamous type mucosa with no diagnostic abnormality.  -No evidence of eosinophilic esophagitis.    D. Terminal ileum; biopsy:  -Benign terminal ileal mucosa with no diagnostic abnormality.    E. Colon; random biopsy:  -Fragments of benign colonic mucosa with no diagnostic abnormality.  -No evidence of microscopic colitis.                  Current Outpatient Medications   Medication Sig Dispense Refill    Galcanezumab-gnlm (EMGALITY) 120 MG/ML Subcutaneous Solution Auto-injector Inject 1 mL into the skin every 30 (thirty) days. 1 mL 11    WEGOVY 2.4 MG/0.75ML Subcutaneous Solution Auto-injector Inject 0.75 mL (2.4 mg total) into the skin once a week. 9 mL 1    tretinoin 0.025 % External Cream Apply pea sized amount to the full face at night, making sure to avoid the eyes and lips. Wash off in the morning. Start using every other night and then progress to every night as tolerated. Apply moisturizer nightly to avoid excessive drying 20 g 0    levothyroxine 150 MCG Oral Tab Take 1 tablet (150 mcg total) by mouth daily. 90 tablet 3    Cyanocobalamin (VITAMIN B-12 ER) 2000 MCG Oral Tab CR Take 1 tablet (2,000 mcg total) by mouth daily.      clindamycin 1 % External Lotion Apply twice daily with flares of pimples on body (Patient  taking differently: as needed. Apply twice daily with flares of pimples on body) 60 mL 11    diazePAM 10 MG Oral Tab Place 1 tablet (10 mg total) vaginally nightly. 30 tablet 2    SUMAtriptan Succinate 100 MG Oral Tab TAKE AT ONSET; REPEAT ONCE AFTER 2 HOURS MAXIMUM DAILY DOSE IS 2 TABLETS 9 tablet 2    Fluocinonide 0.05 % External Solution Use twice daily Monday-Friday with flares on scalp. Do not use on face. 60 mL 5    ketoconazole 2 % External Shampoo Use 2-3 times weekly. Lather into scalp and leave on for 5 minutes before washing off. 120 mL 11    Tretinoin 0.05 % External Cream Apply pea sized amount to the full face at night, making sure to avoid the eyes and lips. Wash off in the morning. Start using every other night and then progress to every night as tolerated. Apply moisturizer nightly to avoid excessive drying (Patient not taking: Reported on 1/29/2025) 45 g 0    LEVOCETIRIZINE 5 MG Oral Tab TAKE 1 TABLET(5 MG) BY MOUTH EVERY EVENING 90 tablet 1    azelastine 0.1 % Nasal Solution by Nasal route 2 (two) times daily.      Vitamin D3, Cholecalciferol, (VITAMIN D3) 125 MCG (5000 UT) Oral Cap Take 1 capsule (5,000 Units total) by mouth daily.      MULTIPLE VITAMIN OR Take 1 tablet by mouth daily.           Allergies:Allergies[1]  Imaging: No results found.       PHYSICAL EXAM:   Physical Exam                   Over 85 minutes spent today discussing the above and counseling and educating this patient, reviewing chart notes and data and documenting clinical information in the EHR, independently interpreting results and communicating results to the patient/family and composing this comprehensive note, ordering medications or testing, referring and communicating with other healthcare providers, and care coordination with the patient's other providers.      Digital transcription software was utilized to produce this note. The note was proofread for content only. Typographical errors may remain.       Meds This  Visit:  Requested Prescriptions      No prescriptions requested or ordered in this encounter       Imaging & Referrals:  None       ID#[id number]         [1]   Allergies  Allergen Reactions    Doxycycline NAUSEA AND VOMITING    Hydrocodone NAUSEA AND VOMITING     Narco    Tree Nuts OTHER (SEE COMMENTS)     Abdominal cramps

## 2025-02-01 ENCOUNTER — APPOINTMENT (OUTPATIENT)
Dept: GENERAL RADIOLOGY | Age: 30
End: 2025-02-01
Attending: NURSE PRACTITIONER
Payer: COMMERCIAL

## 2025-02-01 ENCOUNTER — HOSPITAL ENCOUNTER (OUTPATIENT)
Age: 30
Discharge: HOME | End: 2025-02-01
Payer: COMMERCIAL

## 2025-02-01 VITALS
HEART RATE: 105 BPM | DIASTOLIC BLOOD PRESSURE: 85 MMHG | OXYGEN SATURATION: 97 % | SYSTOLIC BLOOD PRESSURE: 137 MMHG | TEMPERATURE: 99 F | RESPIRATION RATE: 20 BRPM

## 2025-02-01 DIAGNOSIS — J06.9 VIRAL URI WITH COUGH: Primary | ICD-10-CM

## 2025-02-01 LAB
S PYO AG THROAT QL IA.RAPID: NEGATIVE
SARS-COV-2 RNA RESP QL NAA+PROBE: NOT DETECTED

## 2025-02-01 PROCEDURE — 99214 OFFICE O/P EST MOD 30 MIN: CPT

## 2025-02-01 PROCEDURE — 87651 STREP A DNA AMP PROBE: CPT | Performed by: NURSE PRACTITIONER

## 2025-02-01 PROCEDURE — 71046 X-RAY EXAM CHEST 2 VIEWS: CPT | Performed by: NURSE PRACTITIONER

## 2025-02-01 RX ORDER — BENZONATATE 100 MG/1
100 CAPSULE ORAL 3 TIMES DAILY PRN
Qty: 12 CAPSULE | Refills: 0 | Status: SHIPPED | OUTPATIENT
Start: 2025-02-01

## 2025-02-01 NOTE — DISCHARGE INSTRUCTIONS
COVID and strep are negative. No pneumonia seen on the chest xray. Symptoms appear viral.  You may continue the home remedies.  Take Tessalon as needed for cough.  Try warm steam to face.  Vicks on your chest.  Drink plenty of fluids.  Schedule follow-up with Dr. Pedro if no improvement

## 2025-02-01 NOTE — ED PROVIDER NOTES
Patient Seen in: [facility]      History     Chief Complaint   Patient presents with    Cough/URI     Stated Complaint: Cough/URI  Subjective:   29-year-old female with migraines, GERD, IBS, hypothyroidism presents from home.  Patient states she has been sick for about a week.  Complaining of sore throat, burning in her chest, ears clogged, states her voice went out, stuffy nose, coughing up green phlegm.  No fever.  No COVID testing done at home.  She has been taking Sudafed and NyQuil at home and using salt water gargles drinking hot tea.  No history of pneumonia.  She does vape.    The history is provided by the patient. No  was used.     Objective:   Past Medical History:    Acne    I was about 16 years old when it started. Got worse in college ~2018    Allergic rhinitis    since childhood    Anxiety    Chronic migraine    Contact allergic reaction    Environmental allergies    Childhood    Esophageal reflux    had Colonoscopy    Hypothyroidism    Irritable bowel syndrome    runs in family    Kidney stone    Latent tuberculosis by blood test    Obesity    Scoliosis    slight scoliosis    TMJ (dislocation of temporomandibular joint)            Past Surgical History:   Procedure Laterality Date    Colonoscopy  2021    Other surgical history  2017    wisdom teeth    [place] teeth removed                Social History     Socioeconomic History    Marital status: Single   Tobacco Use    Smoking status: Former     Types: Cigarettes     Passive exposure: Never    Smokeless tobacco: Never    Tobacco comments:     Quit smoking 7 years ago, she does vaping occasional   Vaping Use    Vaping status: Every Day    Substances: Nicotine, Flavoring, 5%    Devices: Disposable, Pre-filled or refillable cartridge   Substance and Sexual Activity    Alcohol use: Not Currently    Drug use: Never    Sexual activity: Yes     Partners: Male   Other Topics Concern    Blood Transfusions No    Caffeine Concern  Yes     Comment: coffee and soda  1 glass daily    Exercise No    Grew up on a farm No    History of tanning No    Outdoor occupation No    Breast feeding No    Reaction to local anesthetic No    Pt has a pacemaker No    Pt has a defibrillator No     Social Drivers of Health      Received from [facility]    [facility]            Review of Systems    Positive for stated complaint: Cough/URI    Other systems are as noted in HPI.  Constitutional and vital signs reviewed.      All other systems reviewed and negative except as noted above.    Physical Exam     ED Triage Vitals [02/01/25 1149]   /85   Pulse 105   Resp 20   Temp 98.6 °F (37 °C)   Temp src Oral   SpO2 97 %   O2 Device None (Room air)     Current:/85   Pulse 105   Temp 98.6 °F (37 °C) (Oral)   Resp 20   SpO2 97%     Physical Exam  Vitals and nursing note reviewed.   Constitutional:       General: She is not in acute distress.     Appearance: Normal appearance. She is not ill-appearing or toxic-appearing.   HENT:      Head: Normocephalic and atraumatic.      Right Ear: Tympanic membrane, ear canal and external ear normal.      Left Ear: Tympanic membrane, ear canal and external ear normal.      Nose: Congestion present.      Mouth/Throat:      Mouth: Mucous membranes are moist.      Pharynx: Oropharynx is clear. No pharyngeal swelling or posterior oropharyngeal erythema.      Tonsils: No tonsillar exudate.   Eyes:      Pupils: Pupils are equal, round, and reactive to light.   Cardiovascular:      Rate and Rhythm: Normal rate and regular rhythm.      Pulses: Normal pulses.   Pulmonary:      Effort: Pulmonary effort is normal. No respiratory distress.      Breath sounds: Normal breath sounds.      Comments: Lungs clear.  No adventitious lung sounds.  No distress.  No hypoxia.  Pulse ox 97% ra. Which is normal    Abdominal:      General: Abdomen is flat.      Palpations: Abdomen is soft.   Musculoskeletal:         General: No  signs of injury. Normal range of motion.      Cervical back: Normal range of motion and neck supple.   Skin:     General: Skin is warm and dry.      Capillary Refill: Capillary refill takes less than 2 seconds.   Neurological:      General: No focal deficit present.      Mental Status: She is alert and oriented to person, place, and time.      GCS: GCS eye subscore is 4. GCS verbal subscore is 5. GCS motor subscore is 6.   Psychiatric:         Mood and Affect: Mood normal.         Behavior: Behavior normal.         Thought Content: Thought content normal.         Judgment: Judgment normal.         ED Course   Radiology:  XR CHEST PA + LAT CHEST (CPT=71046)    Result Date: 2/1/2025  CONCLUSION:   Negative for radiographically evident acute intrathoracic process.    elm-remote  Dictated by (CST): Bhaskar Roberts MD on 2/01/2025 at 12:33 PM     Finalized by (CST): Bhaskar Roberts MD on 2/01/2025 at 12:33 PM         Labs Reviewed   RAPID STREP A - Normal   RAPID SARS-COV-2 BY PCR - Normal     Recent Results (from the past 24 hours)   Rapid Strep A - ID NOW    Collection Time: 02/01/25 11:56 AM    Specimen: Throat; Other   Result Value Ref Range    Strep A by PCR Negative Negative   Rapid SARS-CoV-2 by PCR    Collection Time: 02/01/25 11:56 AM    Specimen: Nares; Other   Result Value Ref Range    Rapid SARS-CoV-2 by PCR Not Detected Not Detected       MDM     Medical Decision Making  Differential diagnoses reflecting the complexity of care include: Strep, COVID, pneumonia, viral illness  Strep negative  COVID-negative  Chest x-ray is negative for pneumonia  Patient is well-appearing on exam.  Lungs are clear.  No distress.  Hypoxia.  Pulse ox 97% room air which is normal.  No significant complaints of sinus pressure, sinus tenderness, facial swelling.  No evidence of bacterial sinusitis   no otitis media  Symptoms appear viral  Lengthy discussion with patient regarding viral origin.  Over-the-counter options for symptom  control.    Rx Tessalon.  Plan of Care: May continue Sudafed and NyQuil.  Warm steam to face.  Oral fluids.  Follow-up with primary doctor if no improvement over the next few days    Results and plan of care discussed with the patient/family. They are in agreement with discharge. They understand to follow up with their primary doctor or the referral physician for further evaluation, especially if no improvement.  Also discussed the limitations of immediate care, patient is aware that if symptoms are worse they should go to the emergency room. Verbal and written discharge instructions were given.     My independent interpretation of studies of: No pneumonia on chest x-ray  Diagnostic tests and medications considered but not ordered were: No indication for antibiotics  Shared decision making was done by: Patient agreeable to chest x-ray today  Comorbidities that add complexity to management include: IBS, GERD, migraines, hypothyroidism  External chart review was done and was noted: Reviewed, visits with GI. COVID + 8/12/24  History obtained by an independent source was from: Father  Discussions and management was done with: N/A  Social determinants of health that affect care: N/A              Problems Addressed:  Viral URI with cough: acute illness or injury    Amount and/or Complexity of Data Reviewed  Independent Historian: parent  Labs: ordered. Decision-making details documented in ED Course.  Radiology: ordered and independent interpretation performed. Decision-making details documented in ED Course.    Risk  OTC drugs.  Prescription drug management.        Disposition and Plan     Clinical Impression:  1. Viral URI with cough         Disposition:  Discharge  2/1/2025 12:38 pm    Follow-up:  Ruby Pedro MD  [address] [place] [id number]  [phone]                Medications Prescribed:  Discharge Medication List as of 2/1/2025 12:46 PM        START taking these medications     Details   benzonatate 100 MG Oral Cap Take 1 capsule (100 mg total) by mouth 3 (three) times daily as needed for cough., Normal, Disp-12 capsule, R-0

## 2025-02-03 ENCOUNTER — NURSE TRIAGE (OUTPATIENT)
Dept: FAMILY MEDICINE CLINIC | Facility: CLINIC | Age: 30
End: 2025-02-03

## 2025-02-03 ENCOUNTER — OFFICE VISIT (OUTPATIENT)
Dept: FAMILY MEDICINE CLINIC | Facility: CLINIC | Age: 30
End: 2025-02-03

## 2025-02-03 VITALS
OXYGEN SATURATION: 96 % | SYSTOLIC BLOOD PRESSURE: 110 MMHG | TEMPERATURE: 99 F | DIASTOLIC BLOOD PRESSURE: 75 MMHG | HEART RATE: 98 BPM | HEIGHT: 65 IN | BODY MASS INDEX: 34.02 KG/M2 | WEIGHT: 204.19 LBS

## 2025-02-03 DIAGNOSIS — J32.9 SINUSITIS, UNSPECIFIED CHRONICITY, UNSPECIFIED LOCATION: Primary | ICD-10-CM

## 2025-02-03 DIAGNOSIS — J06.9 VIRAL URI WITH COUGH: ICD-10-CM

## 2025-02-03 PROCEDURE — 3074F SYST BP LT 130 MM HG: CPT

## 2025-02-03 PROCEDURE — 99213 OFFICE O/P EST LOW 20 MIN: CPT

## 2025-02-03 PROCEDURE — 3008F BODY MASS INDEX DOCD: CPT

## 2025-02-03 PROCEDURE — 3078F DIAST BP <80 MM HG: CPT

## 2025-02-03 RX ORDER — AMOXICILLIN 875 MG/1
875 TABLET, COATED ORAL 2 TIMES DAILY
Qty: 14 TABLET | Refills: 0 | Status: SHIPPED | OUTPATIENT
Start: 2025-02-03 | End: 2025-02-10

## 2025-02-03 RX ORDER — DEXTROMETHORPHAN HYDROBROMIDE AND PROMETHAZINE HYDROCHLORIDE 15; 6.25 MG/5ML; MG/5ML
5 SYRUP ORAL 4 TIMES DAILY PRN
Qty: 120 ML | Refills: 0 | Status: SHIPPED | OUTPATIENT
Start: 2025-02-03

## 2025-02-03 NOTE — PROGRESS NOTES
Subjective:   Essence Borja is a 29 year old female who presents for Urgent Care F/u (2/1/25 cough/URI ( Rapid SARS-CoV-2: Not Detected & Rapid Strep: Negative) : feel little better but coughing getting worse, phlegm: greenish, took steam shower it helped a lil w/ congestion )   Patient is a pleasant 29-year-old female with past medical history significant for acne, IR, anxiety, migraines, hypothyroidism, IBS, obesity, scoliosis, and TMJ. Patient presents to office today for productive cough and conegestion. Of note, patient was seen in  on 2/1/25 \"Complaining of sore throat, burning in her chest, ears clogged, states her voice went out, stuffy nose, coughing up green phlegm.  No fever.  No COVID testing done at home.  She has been taking Sudafed and NyQuil at home and using salt water gargles drinking hot tea.  No history of pneumonia.  She does vape.\" Rapid covid and strep negative. CXR negative. Patient called off this am and states 4    \"Cough that is productive with green sputum, coughing spells are present that stimulate gag reflex; nasal congestion with pain of sinus area and redness/swelling at cheeks. Shortness of breath with chest tightness last night, subsided. She feels chest burning at times. Bilateral ear congestion, left > right. Same day office visit offered --> agreeable and scheduled. [See below for more details\"    Patient states this all started last week Monday. She started to feel sick and got worse. She has this cough. She has lost her voice.  Fatigue has gotten better. Has lots of PND. She has taken sudafed and robitussin. She has pressure and pain in her face with purulent phglem. Pain in teeth. She took sudafed and steam showers this helped.        Past Medical History:    Acne    I was about 16 years old when it started. Got worse in college ~2018    Allergic rhinitis    since childhood    Anxiety    Chronic migraine    Contact allergic reaction    Environmental allergies    Childhood     Esophageal reflux    had Colonoscopy    Hypothyroidism    Irritable bowel syndrome    runs in family    Kidney stone    Latent tuberculosis by blood test    Obesity    Scoliosis    slight scoliosis    TMJ (dislocation of temporomandibular joint)      Past Surgical History:   Procedure Laterality Date    Colonoscopy  2021    Other surgical history  2017    wisdom teeth    [place] teeth removed          History/Other:    Chief Complaint Reviewed and Verified  Nursing Notes Reviewed and   Verified  Tobacco Reviewed  Allergies Reviewed  Medications Reviewed    Problem List Reviewed  Medical History Reviewed  Surgical History   Reviewed  OB Status Reviewed  Family History Reviewed  Social History   Reviewed         Tobacco:  Social History     Tobacco Use   Smoking Status Former    Types: Cigarettes    Passive exposure: Never   Smokeless Tobacco Never   Tobacco Comments    Quit smoking 7 years ago, she does vaping occasional     E-Cigarettes/Vaping       Questions Responses    E-Cigarette Use Current Every Day User    Cartridges/Day vaping          E-Cigarette/Vaping Substances       Questions Responses    Nicotine Yes    THC No    CBD No    Flavoring Yes    Other 5%          E-Cigarette/Vaping Devices       Questions Responses    Disposable Yes    Pre-filled or Refillable Cartridge Yes    Refillable Tank No    Pre-filled Pod No           Tobacco cessation counseling for <3 minutes.  She smoked tobacco in the past but quit greater than 12 months ago.  Social History     Tobacco Use   Smoking Status Former    Types: Cigarettes    Passive exposure: Never   Smokeless Tobacco Never   Tobacco Comments    Quit smoking 7 years ago, she does vaping occasional          Current Outpatient Medications   Medication Sig Dispense Refill    amoxicillin 875 MG Oral Tab Take 1 tablet (875 mg total) by mouth 2 (two) times daily for 7 days. 14 tablet 0    promethazine-dextromethorphan 6.25-15 MG/5ML Oral Syrup Take 5 mL by  mouth 4 (four) times daily as needed for cough. 120 mL 0    Galcanezumab-gnlm (EMGALITY) 120 MG/ML Subcutaneous Solution Auto-injector Inject 1 mL into the skin every 30 (thirty) days. 1 mL 11    WEGOVY 2.4 MG/0.75ML Subcutaneous Solution Auto-injector Inject 0.75 mL (2.4 mg total) into the skin once a week. 9 mL 1    tretinoin 0.025 % External Cream Apply pea sized amount to the full face at night, making sure to avoid the eyes and lips. Wash off in the morning. Start using every other night and then progress to every night as tolerated. Apply moisturizer nightly to avoid excessive drying 20 g 0    levothyroxine 150 MCG Oral Tab Take 1 tablet (150 mcg total) by mouth daily. 90 tablet 3    SUMAtriptan Succinate 100 MG Oral Tab TAKE AT ONSET; REPEAT ONCE AFTER 2 HOURS MAXIMUM DAILY DOSE IS 2 TABLETS 9 tablet 2    Cyanocobalamin (VITAMIN B-12 ER) 2000 MCG Oral Tab CR Take 1 tablet (2,000 mcg total) by mouth daily.      Fluocinonide 0.05 % External Solution Use twice daily Monday-Friday with flares on scalp. Do not use on face. 60 mL 5    ketoconazole 2 % External Shampoo Use 2-3 times weekly. Lather into scalp and leave on for 5 minutes before washing off. 120 mL 11    clindamycin 1 % External Lotion Apply twice daily with flares of pimples on body 60 mL 11    LEVOCETIRIZINE 5 MG Oral Tab TAKE 1 TABLET(5 MG) BY MOUTH EVERY EVENING 90 tablet 1    Vitamin D3, Cholecalciferol, (VITAMIN D3) 125 MCG (5000 UT) Oral Cap Take 1 capsule (5,000 Units total) by mouth daily.      MULTIPLE VITAMIN OR Take 1 tablet by mouth daily.      benzonatate 100 MG Oral Cap Take 1 capsule (100 mg total) by mouth 3 (three) times daily as needed for cough. (Patient not taking: Reported on 2/3/2025) 12 capsule 0    rifAXIMin 550 MG Oral Tab Take 1 tablet (550 mg total) by mouth 3 (three) times daily for 14 days. (Patient not taking: Reported on 2/3/2025) 42 tablet 2    diazePAM 10 MG Oral Tab Place 1 tablet (10 mg total) vaginally nightly.  (Patient not taking: Reported on 2/3/2025) 30 tablet 2    Tretinoin 0.05 % External Cream Apply pea sized amount to the full face at night, making sure to avoid the eyes and lips. Wash off in the morning. Start using every other night and then progress to every night as tolerated. Apply moisturizer nightly to avoid excessive drying (Patient not taking: Reported on 2/3/2025) 45 g 0    azelastine 0.1 % Nasal Solution by Nasal route 2 (two) times daily. (Patient not taking: Reported on 2/3/2025)           Review of Systems:  Review of Systems   Constitutional:  Negative for chills, fatigue and fever.   HENT:  Positive for congestion, postnasal drip, rhinorrhea, sinus pressure and sinus pain. Negative for sore throat.    Respiratory:  Positive for cough. Negative for shortness of breath and wheezing.    Cardiovascular:  Negative for chest pain.         Objective:   /75 (BP Location: Left arm, Patient Position: Sitting, Cuff Size: large)   Pulse 98   Temp 98.8 °F (37.1 °C) (Tympanic)   Ht 5' 5\" (1.651 m)   Wt 204 lb 3.2 oz (92.6 kg)   LMP 10/26/2023   SpO2 96%   BMI 33.98 kg/m²  Estimated body mass index is 33.98 kg/m² as calculated from the following:    Height as of this encounter: 5' 5\" (1.651 m).    Weight as of this encounter: 204 lb 3.2 oz (92.6 kg).  Physical Exam  Vitals and nursing note reviewed.   Constitutional:       Appearance: Normal appearance. She is normal weight.   HENT:      Head: Normocephalic and atraumatic.      Right Ear: Tympanic membrane, ear canal and external ear normal. There is no impacted cerumen.      Left Ear: Tympanic membrane, ear canal and external ear normal. There is no impacted cerumen.      Nose: Congestion and rhinorrhea present.      Comments: Purulent sputum     Mouth/Throat:      Mouth: Mucous membranes are moist.      Pharynx: Oropharynx is clear. No posterior oropharyngeal erythema.      Comments: +pnd  Left maxillary sinus negative for  transillumination  Cardiovascular:      Rate and Rhythm: Normal rate and regular rhythm.      Pulses: Normal pulses.      Heart sounds: Normal heart sounds. No murmur heard.  Pulmonary:      Effort: No respiratory distress.      Breath sounds: Normal breath sounds.   Abdominal:      General: Abdomen is flat.      Palpations: Abdomen is soft.   Musculoskeletal:      Cervical back: No tenderness.   Lymphadenopathy:      Cervical: No cervical adenopathy.   Skin:     Capillary Refill: Capillary refill takes less than 2 seconds.   Neurological:      Mental Status: She is alert and oriented to person, place, and time.           Assessment & Plan:   1. Sinusitis, unspecified chronicity, unspecified location (Primary)  -     Amoxicillin; Take 1 tablet (875 mg total) by mouth 2 (two) times daily for 7 days.  Dispense: 14 tablet; Refill: 0  -     Promethazine-DM; Take 5 mL by mouth 4 (four) times daily as needed for cough.  Dispense: 120 mL; Refill: 0  2. Viral URI with cough  -     Promethazine-DM; Take 5 mL by mouth 4 (four) times daily as needed for cough.  Dispense: 120 mL; Refill: 0    Exam and description of symptoms are consistent with sinusitis.  Patient has purulent discharge in nares with significant postnasal drip.  Sinuses are negative for transillumination in the maxillary sinus.  Patient has pressure and pain in teeth.  Start Amocillin twice daily x 7 days, patient reports she is unable tot tolerate Augmentin   Advised increasing hydration, advised humidifier and/or running hot shower with door closed in bathroom, advised saline nasal rinses, advised warm compresses to face 2-3 times daily.  Red flags reviewed  Follow-up in office physical     Patient aware of plan of care. All questions answered to satisfaction of the patient. Patient instructed to call office or reach out via [employer] if any issues arise. For urgent issues and/or reviewed red flags please proceed to the urgent care or ER.  Also, inform the  nurse practitioner with any new symptoms or medication side effects.      Return for Annual physical.    Ralf Nguyen, ISAIAH, 2/3/2025, 9:09 AM

## 2025-02-03 NOTE — TELEPHONE ENCOUNTER
Medication PA Requested: Wegovy 0.2MG/0.5ML auto-injectors:                                                          CoverMyMeds Used: No  Key:   Quantity:9mL  Day Supply: 90  Sig: Inject 0.75 mL (2.4 mg total) into the skin once a week.   DX Code:E66.9     Electronic prior authorization submitted, last office visit 1/23  Awaiting determination

## 2025-02-03 NOTE — TELEPHONE ENCOUNTER
Action Requested: Summary for Provider     []  Critical Lab, Recommendations Needed  [] Need Additional Advice  [x]   FYI    []   Need Orders  [] Need Medications Sent to Pharmacy  []  Other     SUMMARY: Cough that is productive with green sputum, coughing spells are present that stimulate gag reflex; nasal congestion with pain of sinus area and redness/swelling at cheeks. Shortness of breath with chest tightness last night, subsided. She feels chest burning at times. Bilateral ear congestion, left > right. Same day office visit offered --> agreeable and scheduled. [See below for more details]    Reason for call: Cough/URI/Sinus Problem  Onset: just prior to 1/25/25    Reason for Disposition   SEVERE coughing spells (e.g., whooping sound after coughing, vomiting after coughing)   Redness or swelling on the cheek, forehead, or around the eye    Protocols used: Cough-A-OH, Sinus Pain or Congestion-A-OH      Patient called states she has been feeling ill, she went to [facility] and was prescribed benzonatate. Patient has a cough that is productive with green mucous. She denies any fevers. She has some nasal congestion, rhinorrhea with PND. She also has had a headache for th past 2 days that is right in the middle of her forehead. She has some redness and mild swelling by cheeks. She has some bilateral ear congestion; Left > Right; denies any drainage from ears. Mild soreness of throat. She denies any chest pain. She has some mild chest tightness and shortness of breath at night; denies any wheezing. Yesterday sh did have a bloody nose of right nostril yesterday, but attributes to dryness. She has been drinking about 2.5-3 L each day. She has been taking Xyzal OTC as directed, Flonase OTC as directed - she will consider also incorporating Astepro OTC as directed at  her own discretion, she has also been using humidifier at home. Refer to system/assessment yes/no answers. Same day office visit offered -->  agreeable and scheduled. Home Care Advice discussed, per protocol. Patient instructed any new or worsening symptoms [reviewed] seek immediate medical attention--> Emergency Department. Patient verbalized understanding. No further questions or concerns at this time.    Future Appointments   Date Time Provider Department Center   2/3/2025 11:00 AM Ralf Nguyen APRN [facility]

## 2025-02-11 ENCOUNTER — NURSE ONLY (OUTPATIENT)
Dept: OBGYN CLINIC | Facility: CLINIC | Age: 30
End: 2025-02-11
Payer: COMMERCIAL

## 2025-02-11 DIAGNOSIS — Z30.42 ENCOUNTER FOR DEPO-PROVERA CONTRACEPTION: Primary | ICD-10-CM

## 2025-02-11 PROCEDURE — 96372 THER/PROPH/DIAG INJ SC/IM: CPT | Performed by: OBSTETRICS & GYNECOLOGY

## 2025-02-11 RX ORDER — MEDROXYPROGESTERONE ACETATE 150 MG/ML
150 INJECTION, SUSPENSION INTRAMUSCULAR ONCE
Status: DISCONTINUED | OUTPATIENT
Start: 2025-02-11 | End: 2025-02-11

## 2025-02-11 RX ADMIN — MEDROXYPROGESTERONE ACETATE 150 MG: 150 INJECTION, SUSPENSION INTRAMUSCULAR at 12:09:00

## 2025-02-11 NOTE — PROGRESS NOTES
Patient here for nurse visit for Depo-Provera administration. Two patient identifiers verified with patient.     Last Depo:  11-14-24  Due for next Depo 01/30/25 - 02/27/25  Last visit with Dr. painting on 05/14/24  Last Pap 04/11/23 due 04/2026      Depo Injection given in right upper outer gluteus.   Patient tolerated injection well no reaction at this time.   Depo calender given to pt. Pt aware to return to office 04/29/255-05/27/25 for next Depo Injection.       Patient verbalized understanding and agrees with plan of care

## 2025-02-11 NOTE — TELEPHONE ENCOUNTER
Received approval from [employer] for Wegovy 2.4mg/0.75ml. Granted 1/6/25-2/5/26.

## 2025-03-20 ENCOUNTER — TELEPHONE (OUTPATIENT)
Dept: CASE MANAGEMENT | Age: 30
End: 2025-03-20

## 2025-03-20 ENCOUNTER — TELEPHONE (OUTPATIENT)
Dept: FAMILY MEDICINE CLINIC | Facility: CLINIC | Age: 30
End: 2025-03-20

## 2025-03-20 ENCOUNTER — PATIENT MESSAGE (OUTPATIENT)
Dept: FAMILY MEDICINE CLINIC | Facility: CLINIC | Age: 30
End: 2025-03-20

## 2025-03-20 DIAGNOSIS — L60.0 IGTN (INGROWING TOE NAIL): Primary | ICD-10-CM

## 2025-03-20 DIAGNOSIS — L60.0 INGROWING NAIL: Primary | ICD-10-CM

## 2025-03-20 NOTE — TELEPHONE ENCOUNTER
Patient called requesting the Podiatry referral be switched from Dr. Cervantes to Dr. Rodriguez, patient also states appointment is for Monday.

## 2025-03-20 NOTE — TELEPHONE ENCOUNTER
Ralf Nguyen,     Patient needs referral to Dr. Rodriguez for appointment 3/24/25.     Pended referral please review diagnosis and sign off if you agree.    Thank you.  Soila Milian  [facility]

## 2025-03-20 NOTE — TELEPHONE ENCOUNTER
ISAIAH Rascon,    Patient is HMO and requesting a referral for podiatry to see Dr. Rodriguez for an ingrown toenail.     Last office visit: 02-    Order pended for review and signature if appropriate,  Please advise.    Thank you,  Ana PALMER MA

## 2025-04-07 ENCOUNTER — OFFICE VISIT (OUTPATIENT)
Dept: PODIATRY CLINIC | Facility: CLINIC | Age: 30
End: 2025-04-07

## 2025-04-07 DIAGNOSIS — L60.0 INGROWN TOENAIL OF BOTH FEET: Primary | ICD-10-CM

## 2025-04-07 PROCEDURE — 99204 OFFICE O/P NEW MOD 45 MIN: CPT | Performed by: PODIATRIST

## 2025-04-07 RX ORDER — CEFADROXIL 500 MG/1
500 CAPSULE ORAL 2 TIMES DAILY
Qty: 30 CAPSULE | Refills: 0 | Status: SHIPPED | OUTPATIENT
Start: 2025-04-07
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
## 2025-04-23 ENCOUNTER — PATIENT MESSAGE (OUTPATIENT)
Dept: FAMILY MEDICINE CLINIC | Facility: CLINIC | Age: 30
End: 2025-04-23

## 2025-04-23 DIAGNOSIS — D22.9 CHANGE IN MOLE: Primary | ICD-10-CM

## 2025-04-28 ENCOUNTER — HOSPITAL ENCOUNTER (OUTPATIENT)
Dept: GENERAL RADIOLOGY | Facility: HOSPITAL | Age: 30
Discharge: HOME | End: 2025-04-28
Attending: PODIATRIST
Payer: COMMERCIAL

## 2025-04-28 ENCOUNTER — OFFICE VISIT (OUTPATIENT)
Dept: PODIATRY CLINIC | Facility: CLINIC | Age: 30
End: 2025-04-28

## 2025-04-28 DIAGNOSIS — S92.424A CLOSED NONDISPLACED FRACTURE OF DISTAL PHALANX OF RIGHT GREAT TOE, INITIAL ENCOUNTER: ICD-10-CM

## 2025-04-28 DIAGNOSIS — M79.674 GREAT TOE PAIN, RIGHT: ICD-10-CM

## 2025-04-28 DIAGNOSIS — M79.674 GREAT TOE PAIN, RIGHT: Primary | ICD-10-CM

## 2025-04-28 PROCEDURE — 99214 OFFICE O/P EST MOD 30 MIN: CPT | Performed by: PODIATRIST

## 2025-04-28 PROCEDURE — 73660 X-RAY EXAM OF TOE(S): CPT | Performed by: PODIATRIST
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
## 2025-05-15 ENCOUNTER — OFFICE VISIT (OUTPATIENT)
Dept: OBGYN CLINIC | Facility: CLINIC | Age: 30
End: 2025-05-15
Payer: COMMERCIAL

## 2025-05-15 VITALS
HEIGHT: 65 IN | SYSTOLIC BLOOD PRESSURE: 112 MMHG | DIASTOLIC BLOOD PRESSURE: 74 MMHG | BODY MASS INDEX: 33.15 KG/M2 | WEIGHT: 199 LBS

## 2025-05-15 DIAGNOSIS — Z30.42 ENCOUNTER FOR SURVEILLANCE OF INJECTABLE CONTRACEPTIVE: ICD-10-CM

## 2025-05-15 DIAGNOSIS — R10.2 PELVIC PAIN: ICD-10-CM

## 2025-05-15 DIAGNOSIS — Z01.419 ENCOUNTER FOR ANNUAL ROUTINE GYNECOLOGICAL EXAMINATION: Primary | ICD-10-CM

## 2025-05-15 DIAGNOSIS — Z11.3 SCREEN FOR STD (SEXUALLY TRANSMITTED DISEASE): ICD-10-CM

## 2025-05-15 PROCEDURE — 3078F DIAST BP <80 MM HG: CPT | Performed by: OBSTETRICS & GYNECOLOGY

## 2025-05-15 PROCEDURE — 87086 URINE CULTURE/COLONY COUNT: CPT | Performed by: OBSTETRICS & GYNECOLOGY

## 2025-05-15 PROCEDURE — 3074F SYST BP LT 130 MM HG: CPT | Performed by: OBSTETRICS & GYNECOLOGY

## 2025-05-15 PROCEDURE — 3008F BODY MASS INDEX DOCD: CPT | Performed by: OBSTETRICS & GYNECOLOGY

## 2025-05-15 PROCEDURE — 99395 PREV VISIT EST AGE 18-39: CPT | Performed by: OBSTETRICS & GYNECOLOGY

## 2025-05-15 PROCEDURE — 87491 CHLMYD TRACH DNA AMP PROBE: CPT | Performed by: OBSTETRICS & GYNECOLOGY

## 2025-05-15 PROCEDURE — 96372 THER/PROPH/DIAG INJ SC/IM: CPT | Performed by: OBSTETRICS & GYNECOLOGY

## 2025-05-15 PROCEDURE — 87591 N.GONORRHOEAE DNA AMP PROB: CPT | Performed by: OBSTETRICS & GYNECOLOGY

## 2025-05-15 RX ORDER — MEDROXYPROGESTERONE ACETATE 150 MG/ML
150 INJECTION, SUSPENSION INTRAMUSCULAR ONCE
Status: SHIPPED | OUTPATIENT
Start: 2025-05-15

## 2025-05-15 RX ADMIN — MEDROXYPROGESTERONE ACETATE 150 MG: 150 INJECTION, SUSPENSION INTRAMUSCULAR at 11:52:00

## 2025-05-15 NOTE — PROGRESS NOTES
ANNUAL GYN EXAM  [facility]    CHIEF COMPLAINT:    Chief Complaint   Patient presents with    Annual     Depo       HISTORY OF PRESENT ILLNESS:   Essence Borja is a 29 year old female   who presents for annual well woman visit.  She is feeling well without complaints.  Currently on Depo Provera.   Had tried Mirena IUD , but had significant cramping.   Admits to new partner since last visit, but no current partner.     States every couple months will have twinge of pain from umbilicus radiating to pubic bone.     PAST GYNECOLOGICAL HISTORY & OTHER PREVENTIVE MEDICINE  LMP: Patient's last menstrual period was 10/26/2023.  Menarche: 11 years old (5/15/2025 11:23 AM)  Period Cycle (Days): no cycle with depo (5/15/2025 11:23 AM)  Use of Birth Control (if yes, specify type): Depo Provera (5/15/2025 11:23 AM)  Date When Birth Control Last Used: depo last injected 2021. IUD 8/10/2023 (5/15/2025 11:23 AM)  Hx Prior Abnormal Pap: No (5/15/2025 11:23 AM)  Pap Date: 23 (5/15/2025 11:23 AM)  Pap Result Notes: wnl (5/15/2025 11:23 AM)    Complications: amenorrhea  Gravita/Parity:   Contraception: current -Depo Provera; Previous - Depo Provera, Mirena  Sexually transmitted disease history:None  Number of sexual partners: current sexual partners: 0, yrs, Lifetime partners:   Pap history: 2023 Last pap/result: NIL ; history abnormals: none  Abuse history: denies  Vaginal discharge: denies  Bladder symptoms: denies    PAST MEDICAL HISTORY:   Past Medical History[1]     PAST SURGICAL HISTORY:   Past Surgical History[2]     PAST OB HISTORY:  OB History    Para Term  AB Living   0 0 0 0 0 0   SAB IAB Ectopic Multiple Live Births   0 0 0 0 0       CURRENT MEDICATIONS:    Medications - Current[3]    ALLERGIES:  Allergies[4]    SOCIAL HISTORY:  Social Hx on file[5]    FAMILY HISTORY:  Family History[6]  ASSESSMENTS:  REVIEW OF SYSTEMS:  CONSTITUTIONAL:  negative for fevers, chills and  sweats    EYES:  negative for  blurred vision and visual disturbance  RESPIRATORY:  negative for  cough and shortness of breath  CARDIOVASCULAR:  negative for  chest pain, palpitations  GASTROINTESTINAL:  No constipation/diarrhea, no pain  GENITOURINARY:  See History of Present Illness  INTEGUMENT/BREAST: Breast: no masses, no nipple discharge  ENDOCRINE:  negative for acne, constipation, diarrhea, cold intolerance, heat intolerance, fatigue, hair loss, weight gain and weight loss  MUSCULOSKELETAL:  negative for joint pain  NEUROLOGICAL:  negative for dizziness/lightheadedness and headaches  BEHAVIOR/PSYCH:  Negative for depressed mood, anhedonia and anxiety    PHYSICAL EXAM  Patient's last menstrual period was 10/26/2023.   Vitals:    05/15/25 1125   BP: 112/74   Weight: 199 lb (90.3 kg)   Height: 65\"       CONSTITUTIONAL: Awake, alert, cooperative, no apparent distress, and appears stated age   NECK:  symmetrical, trachea midline, no adenopathy, thyroid symmetric, not enlarged   LUNGS: respiration unlabored  CARDIOVASCULAR: no peripheral edema or varicosities, skin warm and dry  ABDOMEN: Soft, non-distended, non-tender, no masses palpated    CHEST/BREASTS: Breasts symmetrical, skin without lesion(s), no nipple retraction or dimpling, no nipple discharge, no masses palpated, no axillary or supraclavicular adenopathy  GENITAL/URINARY:    External Genitalia:  General appearance; normal, Hair distribution; normal, Lesions absent   Urethral Meatus:  Lesions absent, Prolapse absent  Bladder:  Tenderness absent, Cystocele absent  Vagina:  Discharge absent, Lesions absent, Pelvic support normal  Cervix:  Lesions absent, Discharge absent, Tenderness absent  Uterus:  Size normal, Masses absent, Tenderness absent  Adnexa:  Masses absent, Tenderness absent  Anus/Perineum:  Lesions absent    MUSCULOSKELETAL: There is no redness, warmth, or swelling of the joints.  Full range of motion noted.  Motor strength is 5 out of 5 all  extremities bilaterally.  Tone is normal.  NEUROLOGIC: Patient is awake, alert and oriented to name, place and time.  Casual gait is normal.  SKIN: no bruising or bleeding and no rashes  PSYCHIATRIC: Behavior:  Appropriate  Mood:  appropriate  ASSESSMENT AND PLAN:  1. Encounter for annual routine gynecological examination  Pap due 2026  - medroxyPROGESTERone Acetate KAMRAN 150 mg    2. Encounter for surveillance of injectable contraceptive  Enquiring about fertility, given history amenorrhea in the past. Advised that will not be able to perform work up until not on Depo Provera for about 6-12 months and would recommend work up at the time when she is consider pregnancy attempts  - medroxyPROGESTERone Acetate KAMRAN 150 mg    3. Screen for STD (sexually transmitted disease)    - Chlamydia/Gc Amplification; Future    4. Pelvic pain    - Urine Culture, Routine; Future       Preventive Medicine in a 29 year old female  Health Maintenance Topics with due status: Overdue       Topic Date Due    COVID-19 Vaccine 09/01/2024    Annual Physical 01/05/2025       COUNSELING/EDUCATION PERFORMED:   Contraception.  Form chosen:  Depo Provera  method use review  emergency contraception  Cervical Cancer Screening  Breast Cancer Screening - monthly self breast exam   Safe sex/STD transmission/use of condoms  follow up 1 yr or as needed  Candi Maloney MD       [1]   Past Medical History:   Acne    I was about 16 years old when it started. Got worse in college ~2018    Allergic rhinitis    since childhood    Anxiety    Chronic migraine    Contact allergic reaction    Environmental allergies    Childhood    Esophageal reflux    had Colonoscopy    Hypothyroidism    Irritable bowel syndrome    runs in family    Kidney stone    Latent tuberculosis by blood test    Obesity    Scoliosis    slight scoliosis    TMJ (dislocation of temporomandibular joint)   [2]   Past Surgical History:  Procedure Laterality Date    Colonoscopy  2021    Other  surgical history  2017    wisdom teeth    Dexter teeth removed     [3]   Current Outpatient Medications:     Galcanezumab-gnlm (EMGALITY) 120 MG/ML Subcutaneous Solution Auto-injector, Inject 1 mL into the skin every 30 (thirty) days., Disp: 1 mL, Rfl: 11    WEGOVY 2.4 MG/0.75ML Subcutaneous Solution Auto-injector, Inject 0.75 mL (2.4 mg total) into the skin once a week., Disp: 9 mL, Rfl: 1    tretinoin 0.025 % External Cream, Apply pea sized amount to the full face at night, making sure to avoid the eyes and lips. Wash off in the morning. Start using every other night and then progress to every night as tolerated. Apply moisturizer nightly to avoid excessive drying, Disp: 20 g, Rfl: 0    levothyroxine 150 MCG Oral Tab, Take 1 tablet (150 mcg total) by mouth daily., Disp: 90 tablet, Rfl: 3    SUMAtriptan Succinate 100 MG Oral Tab, TAKE AT ONSET; REPEAT ONCE AFTER 2 HOURS MAXIMUM DAILY DOSE IS 2 TABLETS, Disp: 9 tablet, Rfl: 2    Cyanocobalamin (VITAMIN B-12 ER) 2000 MCG Oral Tab CR, Take 1 tablet (2,000 mcg total) by mouth daily., Disp: , Rfl:     Fluocinonide 0.05 % External Solution, Use twice daily Monday-Friday with flares on scalp. Do not use on face., Disp: 60 mL, Rfl: 5    ketoconazole 2 % External Shampoo, Use 2-3 times weekly. Lather into scalp and leave on for 5 minutes before washing off., Disp: 120 mL, Rfl: 11    LEVOCETIRIZINE 5 MG Oral Tab, TAKE 1 TABLET(5 MG) BY MOUTH EVERY EVENING, Disp: 90 tablet, Rfl: 1    Vitamin D3, Cholecalciferol, (VITAMIN D3) 125 MCG (5000 UT) Oral Cap, Take 1 capsule (5,000 Units total) by mouth daily., Disp: , Rfl:     MULTIPLE VITAMIN OR, Take 1 tablet by mouth daily., Disp: , Rfl:     cefadroxil 500 MG Oral Cap, Take 1 capsule (500 mg total) by mouth 2 (two) times daily. (Patient not taking: Reported on 5/15/2025), Disp: 30 capsule, Rfl: 0    promethazine-dextromethorphan 6.25-15 MG/5ML Oral Syrup, Take 5 mL by mouth 4 (four) times daily as needed for cough., Disp: 120 mL,  Rfl: 0    benzonatate 100 MG Oral Cap, Take 1 capsule (100 mg total) by mouth 3 (three) times daily as needed for cough. (Patient not taking: Reported on 2/3/2025), Disp: 12 capsule, Rfl: 0    diazePAM 10 MG Oral Tab, Place 1 tablet (10 mg total) vaginally nightly. (Patient not taking: Reported on 2/3/2025), Disp: 30 tablet, Rfl: 2    Tretinoin 0.05 % External Cream, Apply pea sized amount to the full face at night, making sure to avoid the eyes and lips. Wash off in the morning. Start using every other night and then progress to every night as tolerated. Apply moisturizer nightly to avoid excessive drying (Patient not taking: Reported on 5/15/2025), Disp: 45 g, Rfl: 0    clindamycin 1 % External Lotion, Apply twice daily with flares of pimples on body (Patient not taking: Reported on 5/15/2025), Disp: 60 mL, Rfl: 11    azelastine 0.1 % Nasal Solution, by Nasal route 2 (two) times daily. (Patient not taking: Reported on 5/15/2025), Disp: , Rfl:   [4]   Allergies  Allergen Reactions    Doxycycline NAUSEA AND VOMITING    Hydrocodone NAUSEA AND VOMITING     Narco    Tree Nuts OTHER (SEE COMMENTS)     Abdominal cramps   [5]   Social History  Socioeconomic History    Marital status: Single   Tobacco Use    Smoking status: Former     Types: Cigarettes     Passive exposure: Never    Smokeless tobacco: Never    Tobacco comments:     Quit smoking 7 years ago, she does vaping occasional   Vaping Use    Vaping status: Every Day    Substances: Nicotine, Flavoring, 5%    Devices: Disposable, Pre-filled or refillable cartridge   Substance and Sexual Activity    Alcohol use: Not Currently    Drug use: Never    Sexual activity: Not Currently     Partners: Male     Birth control/protection: Injection   Other Topics Concern    Blood Transfusions No    Caffeine Concern Yes     Comment: coffee and soda  1 glass daily    Exercise No    Grew up on a farm No    History of tanning No    Outdoor occupation No    Breast feeding No     Reaction to local anesthetic No    Pt has a pacemaker No    Pt has a defibrillator No   [6]   Family History  Problem Relation Age of Onset    Diabetes Mother         type 2    Obesity Mother     Dementia Maternal Grandmother     Obesity Maternal Grandmother     Heart Disorder Maternal Grandfather         congenital heart condition    Obesity Maternal Grandfather     Obesity Paternal Grandmother     Heart Disorder Sister          of heart valve disorder as infant    Colon Cancer Neg

## 2025-05-15 NOTE — PROGRESS NOTES
Patient here for nurse visit for Depo-Provera administration. Two patient identifiers verified with patient.     Last Depo:  2/11/2025  Last visit with Dr. painting on 5/15/2025  Last Pap 04/11/23 due 04/2026        Depo Injection given in LEFT  upper outer gluteus.   Patient tolerated injection well no reaction at this time.   Depo calender given to pt. Pt aware to return to office 7/31-8/14 for next Depo Injection.

## 2025-05-16 LAB
C TRACH DNA SPEC QL NAA+PROBE: NEGATIVE
N GONORRHOEA DNA SPEC QL NAA+PROBE: NEGATIVE

## 2025-05-21 ENCOUNTER — PATIENT MESSAGE (OUTPATIENT)
Dept: PODIATRY CLINIC | Facility: CLINIC | Age: 30
End: 2025-05-21

## 2025-05-23 ENCOUNTER — HOSPITAL ENCOUNTER (OUTPATIENT)
Dept: MRI IMAGING | Facility: HOSPITAL | Age: 30
Discharge: HOME | End: 2025-05-23
Attending: PODIATRIST
Payer: COMMERCIAL

## 2025-05-23 DIAGNOSIS — M79.674 GREAT TOE PAIN, RIGHT: ICD-10-CM

## 2025-05-23 DIAGNOSIS — S92.424A CLOSED NONDISPLACED FRACTURE OF DISTAL PHALANX OF RIGHT GREAT TOE, INITIAL ENCOUNTER: ICD-10-CM

## 2025-05-23 PROCEDURE — 73718 MRI LOWER EXTREMITY W/O DYE: CPT | Performed by: PODIATRIST

## 2025-05-27 ENCOUNTER — OFFICE VISIT (OUTPATIENT)
Dept: DERMATOLOGY CLINIC | Facility: CLINIC | Age: 30
End: 2025-05-27
Payer: COMMERCIAL

## 2025-05-27 DIAGNOSIS — L82.1 SEBORRHEIC KERATOSIS: Primary | ICD-10-CM

## 2025-05-27 DIAGNOSIS — L21.9 SEBORRHEIC DERMATITIS: ICD-10-CM

## 2025-05-27 PROCEDURE — 99214 OFFICE O/P EST MOD 30 MIN: CPT | Performed by: STUDENT IN AN ORGANIZED HEALTH CARE EDUCATION/TRAINING PROGRAM

## 2025-05-27 RX ORDER — FLUOCINONIDE TOPICAL SOLUTION USP, 0.05% 0.5 MG/ML
SOLUTION TOPICAL
Qty: 60 ML | Refills: 5 | Status: SHIPPED | OUTPATIENT
Start: 2025-05-27

## 2025-05-27 RX ORDER — KETOCONAZOLE 20 MG/ML
SHAMPOO, SUSPENSION TOPICAL
Qty: 120 ML | Refills: 11 | Status: SHIPPED | OUTPATIENT
Start: 2025-05-27

## 2025-05-27 NOTE — PROGRESS NOTES
Established Patient    Referred by: No referring provider defined for this encounter.    CHIEF COMPLAINT: Lesion of concern     HISTORY OF PRESENT ILLNESS: Essence Borja is a 29 year old female here for evaluation of lesion of concern.    1. Growth   Location: R upper back  Duration: 1-2 months  Bleeding, growing, changing?: No  Scaly?:No    Itchy?:Yes  Current treatment: OTC products  Past treatments: None      Personal Dermatologic History  History of skin cancer: No  History of  atypical moles: No    FAMILY HISTORY:  History of melanoma: No    Past Medical History  Past Medical History[1]    REVIEW OF SYSTEMS:  Constitutional: Denies fever, chills, unintentional weight loss.   Skin as per HPI    Medications  Current Medications[2]    PHYSICAL EXAM:  General: awake, alert, no acute distress  Neuropsych: appropriate mood and affect  Eyes: Sclerae anicteric, without conjunctival injection, eyelids unremarkable  Skin: Skin exam was performed today including the following: R upper back, scalp. Pertinent findings include:   - with a brown stuck on papule  - with yellow greasy scaling     ASSESSMENT & PLAN:  Pathophysiology of diagnoses discussed with patient.  Therapeutic options reviewed. Risks, benefits, and alternatives discussed with patient. Instructions reviewed at length.    #Seborrheic keratosis   - Reassured patient regarding benign nature of lesion. No treatment but observation at this time. Follow-up for concerning physical changes or new symptoms.     #Seborrheic dermatitis  - We discussed the etiology, natural history, and chronic, waxing/waning nature of seborrheic dermatitis. Educated patient that seborrheic dermatitis is typically a chronic problem due to inflammation in response to yeast (Malassezia species). Discussed that maintenance is desired rather than cure.    For the scalp:  - Prescribed ketoconazole 2% shampoo three times weekly, leaving in five to ten minutes before washing out. Ok to use  normal shampoo and conditioner afterwards.     - Lidex solution to scalp Monday-Friday. Take weekends off. Avoid use on face, breasts, groin, or axiillae.       Return to clinic: as needed or sooner if something concerning arises     Lyle Smith MD    By signing my name below, I, Anika COUCH MA,  attest that this documentation has been prepared under the direction and in the presence of Lyle Smith MD.   Electronically Signed: Anika COUCH MA, 5/27/2025, 8:10 AM.    I, Lyle Smith MD,  personally performed the services described in this documentation. All medical record entries made by the scribe were at my direction and in my presence.  I have reviewed the chart and agree that the record reflects my personal performance and is accurate and complete.  Lyle Smith MD, 5/27/2025, 11:04 AM           [1]   Past Medical History:   Acne    I was about 16 years old when it started. Got worse in college ~2018    Allergic rhinitis    since childhood    Anxiety    Chronic migraine    Contact allergic reaction    Environmental allergies    Childhood    Esophageal reflux    had Colonoscopy    Hypothyroidism    Irritable bowel syndrome    runs in family    Kidney stone    Latent tuberculosis by blood test    Obesity    Scoliosis    slight scoliosis    TMJ (dislocation of temporomandibular joint)   [2]   Current Outpatient Medications   Medication Sig Dispense Refill    cefadroxil 500 MG Oral Cap Take 1 capsule (500 mg total) by mouth 2 (two) times daily. (Patient not taking: Reported on 5/15/2025) 30 capsule 0    promethazine-dextromethorphan 6.25-15 MG/5ML Oral Syrup Take 5 mL by mouth 4 (four) times daily as needed for cough. 120 mL 0    benzonatate 100 MG Oral Cap Take 1 capsule (100 mg total) by mouth 3 (three) times daily as needed for cough. (Patient not taking: Reported on 2/3/2025) 12 capsule 0    diazePAM 10 MG Oral Tab Place 1 tablet (10 mg total) vaginally nightly. (Patient not taking: Reported on  2/3/2025) 30 tablet 2    Galcanezumab-gnlm (EMGALITY) 120 MG/ML Subcutaneous Solution Auto-injector Inject 1 mL into the skin every 30 (thirty) days. 1 mL 11    WEGOVY 2.4 MG/0.75ML Subcutaneous Solution Auto-injector Inject 0.75 mL (2.4 mg total) into the skin once a week. 9 mL 1    tretinoin 0.025 % External Cream Apply pea sized amount to the full face at night, making sure to avoid the eyes and lips. Wash off in the morning. Start using every other night and then progress to every night as tolerated. Apply moisturizer nightly to avoid excessive drying 20 g 0    levothyroxine 150 MCG Oral Tab Take 1 tablet (150 mcg total) by mouth daily. 90 tablet 3    SUMAtriptan Succinate 100 MG Oral Tab TAKE AT ONSET; REPEAT ONCE AFTER 2 HOURS MAXIMUM DAILY DOSE IS 2 TABLETS 9 tablet 2    Cyanocobalamin (VITAMIN B-12 ER) 2000 MCG Oral Tab CR Take 1 tablet (2,000 mcg total) by mouth daily.      Fluocinonide 0.05 % External Solution Use twice daily Monday-Friday with flares on scalp. Do not use on face. 60 mL 5    ketoconazole 2 % External Shampoo Use 2-3 times weekly. Lather into scalp and leave on for 5 minutes before washing off. 120 mL 11    Tretinoin 0.05 % External Cream Apply pea sized amount to the full face at night, making sure to avoid the eyes and lips. Wash off in the morning. Start using every other night and then progress to every night as tolerated. Apply moisturizer nightly to avoid excessive drying (Patient not taking: Reported on 5/15/2025) 45 g 0    clindamycin 1 % External Lotion Apply twice daily with flares of pimples on body 60 mL 11    LEVOCETIRIZINE 5 MG Oral Tab TAKE 1 TABLET(5 MG) BY MOUTH EVERY EVENING 90 tablet 1    azelastine 0.1 % Nasal Solution by Nasal route 2 (two) times daily. (Patient not taking: Reported on 5/15/2025)      Vitamin D3, Cholecalciferol, (VITAMIN D3) 125 MCG (5000 UT) Oral Cap Take 1 capsule (5,000 Units total) by mouth daily.      MULTIPLE VITAMIN OR Take 1 tablet by mouth  daily.

## 2025-05-28 ENCOUNTER — TELEPHONE (OUTPATIENT)
Dept: PODIATRY CLINIC | Facility: CLINIC | Age: 30
End: 2025-05-28

## 2025-06-02 ENCOUNTER — OFFICE VISIT (OUTPATIENT)
Dept: PODIATRY CLINIC | Facility: CLINIC | Age: 30
End: 2025-06-02
Payer: COMMERCIAL

## 2025-06-02 DIAGNOSIS — E66.812 CLASS 2 OBESITY WITHOUT SERIOUS COMORBIDITY WITH BODY MASS INDEX (BMI) OF 35.0 TO 35.9 IN ADULT, UNSPECIFIED OBESITY TYPE: ICD-10-CM

## 2025-06-02 DIAGNOSIS — S92.424A CLOSED NONDISPLACED FRACTURE OF DISTAL PHALANX OF RIGHT GREAT TOE, INITIAL ENCOUNTER: Primary | ICD-10-CM

## 2025-06-02 PROCEDURE — 99214 OFFICE O/P EST MOD 30 MIN: CPT | Performed by: PODIATRIST

## 2025-06-02 RX ORDER — SEMAGLUTIDE 2.4 MG/.75ML
2.4 INJECTION, SOLUTION SUBCUTANEOUS WEEKLY
Qty: 9 ML | Refills: 0 | Status: SHIPPED | OUTPATIENT
Start: 2025-06-02

## 2025-06-02 NOTE — TELEPHONE ENCOUNTER
Endocrine Refill protocol for weight management    Protocol Criteria:  PASSED Reason: N/A    If below requirement is met, send a 90-day supply with 1 refill per provider protocol.    Verify appointment with Endocrinology completed in the last 6 months or scheduled in the next 3 months.    Last completed office visit:1/23/2025 Hamida Son MD   Next scheduled Follow up:   Future Appointments   Date Time Provider Department Center   6/2/2025 11:50 AM Janessa Rodriguez DPM [facility]   7/9/2025  9:00 AM Nabil Hinojosa MD [facility]   7/24/2025  4:45 PM Hamida Son MD [facility]   10/27/2025  8:15 AM Elena Moore DO [facility]
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
## 2025-06-03 DIAGNOSIS — E03.9 HYPOTHYROIDISM, UNSPECIFIED TYPE: ICD-10-CM

## 2025-06-03 NOTE — TELEPHONE ENCOUNTER
Refill Request:    Current Outpatient Medications   Medication Sig Dispense Refill    levothyroxine 150 MCG Oral Tab Take 1 tablet (150 mcg total) by mouth daily. 90 tablet 3     Please advise

## 2025-06-04 RX ORDER — LEVOTHYROXINE SODIUM 150 UG/1
150 TABLET ORAL DAILY
Qty: 90 TABLET | Refills: 3 | Status: SHIPPED | OUTPATIENT
Start: 2025-06-04

## 2025-06-04 NOTE — TELEPHONE ENCOUNTER
Refill passed per [facility] protocols.    Requested Prescriptions   Pending Prescriptions Disp Refills    levothyroxine 150 MCG Oral Tab 90 tablet 3     Sig: Take 1 tablet (150 mcg total) by mouth daily.       Thyroid Medication Protocol Passed - 6/4/2025 12:15 PM

## 2025-07-09 ENCOUNTER — OFFICE VISIT (OUTPATIENT)
Facility: CLINIC | Age: 30
End: 2025-07-09

## 2025-07-09 VITALS
WEIGHT: 198 LBS | HEART RATE: 71 BPM | BODY MASS INDEX: 32.99 KG/M2 | HEIGHT: 65 IN | SYSTOLIC BLOOD PRESSURE: 134 MMHG | DIASTOLIC BLOOD PRESSURE: 87 MMHG

## 2025-07-09 DIAGNOSIS — K58.0 IRRITABLE BOWEL SYNDROME WITH DIARRHEA: ICD-10-CM

## 2025-07-09 DIAGNOSIS — K21.9 GASTROESOPHAGEAL REFLUX DISEASE, UNSPECIFIED WHETHER ESOPHAGITIS PRESENT: ICD-10-CM

## 2025-07-09 DIAGNOSIS — E28.2 PCOS (POLYCYSTIC OVARIAN SYNDROME): Primary | ICD-10-CM

## 2025-07-09 PROCEDURE — 3079F DIAST BP 80-89 MM HG: CPT | Performed by: INTERNAL MEDICINE

## 2025-07-09 PROCEDURE — 3075F SYST BP GE 130 - 139MM HG: CPT | Performed by: INTERNAL MEDICINE

## 2025-07-09 PROCEDURE — 3008F BODY MASS INDEX DOCD: CPT | Performed by: INTERNAL MEDICINE

## 2025-07-09 PROCEDURE — 99214 OFFICE O/P EST MOD 30 MIN: CPT | Performed by: INTERNAL MEDICINE

## 2025-07-10 ENCOUNTER — PATIENT MESSAGE (OUTPATIENT)
Dept: PODIATRY CLINIC | Facility: CLINIC | Age: 30
End: 2025-07-10

## 2025-07-11 NOTE — TELEPHONE ENCOUNTER
Spoke with patient. She endorses that for the past 2 weeks she has had a scant amount of purulent drainage by the right great toenail. States it is slightly swollen under the nail bed. There is redness, but she endorses that it has been that way since she broke her toe. She has no pain, fever, or chills, feels fine, toe is not bothering her at all. She will send photo through here shortly.

## 2025-07-14 NOTE — TELEPHONE ENCOUNTER
Spoke with patient. She states that she has not really noticed any drainage per se, but there is yellow crust in the corner of the nail. She denies any odor, but states that her dog has been attracted to it.    I offered her an appointment on this coming Monday. Mondays do not work for her. Was going to set her up for Tuesday, but she cannot go to the [place] office. She can only come to [facility]. I advised that Dr. Rodriguez is only there on Monday and some Fridays. It seems her job makes it so she can only come at certain times. I advised that the last referral we have is from March for 2 visits and she has been seen at least that many times already. She also expressed that she is not sure why she would need to come in and can't this be taken care of over the phone. I did advise that with these kind of issues an in-person visit is better to be able to assess if anything is wrong. She stated that she was going to reach out to her primary doctor and would call back after if she felt she needed to.

## 2025-07-16 NOTE — TELEPHONE ENCOUNTER
Patient called looking to speak to the nurse about getting an appointment scheduled.   Patient would like to go to [place] location, advised pt that Dr. Rodriguez is in [place] this coming Monday, we will check to see what time is best for doctor.    Please advise.

## 2025-07-21 ENCOUNTER — OFFICE VISIT (OUTPATIENT)
Dept: PODIATRY CLINIC | Facility: CLINIC | Age: 30
End: 2025-07-21
Payer: COMMERCIAL

## 2025-07-21 VITALS — SYSTOLIC BLOOD PRESSURE: 134 MMHG | HEART RATE: 88 BPM | DIASTOLIC BLOOD PRESSURE: 90 MMHG

## 2025-07-21 DIAGNOSIS — L60.0 INGROWN TOENAIL OF BOTH FEET: Primary | ICD-10-CM
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
## 2025-07-21 NOTE — PROGRESS NOTES
Per Dr Rodriguez verbal request to draw 2 syringes with 1.5 ml Marcaine 0.5% and 1.5 ml Lidocaine 1% for right hallux medial border partial nail avulsion.

## 2025-07-24 ENCOUNTER — OFFICE VISIT (OUTPATIENT)
Dept: ENDOCRINOLOGY CLINIC | Facility: CLINIC | Age: 30
End: 2025-07-24
Payer: COMMERCIAL

## 2025-07-24 VITALS
HEART RATE: 90 BPM | WEIGHT: 196 LBS | DIASTOLIC BLOOD PRESSURE: 89 MMHG | BODY MASS INDEX: 33 KG/M2 | SYSTOLIC BLOOD PRESSURE: 135 MMHG

## 2025-07-24 DIAGNOSIS — E06.3 HYPOTHYROIDISM DUE TO HASHIMOTO'S THYROIDITIS: Primary | ICD-10-CM

## 2025-07-24 DIAGNOSIS — E88.810 METABOLIC SYNDROME: ICD-10-CM

## 2025-07-24 DIAGNOSIS — N91.1 SECONDARY AMENORRHEA: ICD-10-CM

## 2025-07-24 PROCEDURE — 3079F DIAST BP 80-89 MM HG: CPT | Performed by: INTERNAL MEDICINE

## 2025-07-24 PROCEDURE — 3075F SYST BP GE 130 - 139MM HG: CPT | Performed by: INTERNAL MEDICINE

## 2025-07-24 PROCEDURE — 99214 OFFICE O/P EST MOD 30 MIN: CPT | Performed by: INTERNAL MEDICINE

## 2025-07-24 NOTE — PROGRESS NOTES
Name: Essence Borja  Date: 7/24/2025    Referring Physician: Ruby Pedro    Chief Complaint   Patient presents with    Hypothyroidism       HISTORY OF PRESENT ILLNESS   Essence Borja is a 29 year old female who presents for   Chief Complaint   Patient presents with    Hypothyroidism     28 y/o F presents for follow up evaluation of hypothyroidism.  She was diagnosed at approximately 17-18 years old followed by primary care.  She was evaluated due to weight gain prior to finding thyroid diagnosis.  She was started on Levothyroxine therapy since that time.        She was then evaluated by \"wellness\" Physician a year ago who gave option of possible Hashimotos Thyroiditis.      She is currently maintained on Levothyroxine 137mcg PO daily, taking medication in AM and waiting 30-60 min before eating.     +significant weight gain approx 80lbs in the past few years.  +Cold intolerance. +fatigue, +lack of motivation. +night sweats. +somnolence. Hair loss stable.  No menstrual cycles secondary to Depo Injection.  No significant hirsutism.     Compression Symptoms:   Dysphagia: Occ  Dyspnea: No  Voice Change: No  Anterior Neck Pain: No     No family h/o thyroid disease       7/2025   She has been maintained on Wegovy therapy.  She has lost over 40lbs since starting medication. She is tolerating well.  No recurrent GI symptoms.  Weight has now stabilized.     She is also maintained on Levothyroxine 150mcg PO daily, taking in AM and waiting 30-60 min before eating.     No cycles secondary to Depo Provera.     REVIEW OF SYSTEMS  The ROS was updated during office visit 7/24/2025 and updates noted below and in the HPI.     Eyes: no change in vision  Neurologic: no headache, generalized or focal weakness or numbness.  Head: normal  ENT: normal  Lungs: no shortness of breath, wheezing or MARTINEZ  Cardiovascular:  no chest pain or palpitations  Gastrointestinal:  no abdominal pain, bowel movement problems  Musculoskeletal: no muscle  pain or arthralgia  /Gyne: no frequency or discomfort while urinating  Psychiatric:  no acute distress, anxiety  or depression  Skin: normal moisturized skin    Medications:     Current Outpatient Medications:     levothyroxine 150 MCG Oral Tab, Take 1 tablet (150 mcg total) by mouth daily., Disp: 90 tablet, Rfl: 3    WEGOVY 2.4 MG/0.75ML Subcutaneous Solution Auto-injector, Inject 0.75 mL (2.4 mg total) into the skin once a week., Disp: 9 mL, Rfl: 0    ketoconazole 2 % External Shampoo, Use 3 times weekly. Lather into scalp and leave on for 5 minutes before washing off. You may use your regular shampoo or head and shoulders to wash your hair afterwards if desired., Disp: 120 mL, Rfl: 11    Fluocinonide 0.05 % External Solution, Use twice daily Monday-Friday with flares on scalp. Do not use on face., Disp: 60 mL, Rfl: 5    Galcanezumab-gnlm (EMGALITY) 120 MG/ML Subcutaneous Solution Auto-injector, Inject 1 mL into the skin every 30 (thirty) days., Disp: 1 mL, Rfl: 11    tretinoin 0.025 % External Cream, Apply pea sized amount to the full face at night, making sure to avoid the eyes and lips. Wash off in the morning. Start using every other night and then progress to every night as tolerated. Apply moisturizer nightly to avoid excessive drying, Disp: 20 g, Rfl: 0    SUMAtriptan Succinate 100 MG Oral Tab, TAKE AT ONSET; REPEAT ONCE AFTER 2 HOURS MAXIMUM DAILY DOSE IS 2 TABLETS, Disp: 9 tablet, Rfl: 2    Cyanocobalamin (VITAMIN B-12 ER) 2000 MCG Oral Tab CR, Take 1 tablet (2,000 mcg total) by mouth daily., Disp: , Rfl:     Fluocinonide 0.05 % External Solution, Use twice daily Monday-Friday with flares on scalp. Do not use on face., Disp: 60 mL, Rfl: 5    ketoconazole 2 % External Shampoo, Use 2-3 times weekly. Lather into scalp and leave on for 5 minutes before washing off., Disp: 120 mL, Rfl: 11    clindamycin 1 % External Lotion, Apply twice daily with flares of pimples on body, Disp: 60 mL, Rfl: 11     LEVOCETIRIZINE 5 MG Oral Tab, TAKE 1 TABLET(5 MG) BY MOUTH EVERY EVENING, Disp: 90 tablet, Rfl: 1    azelastine 0.1 % Nasal Solution, by Nasal route 2 (two) times daily., Disp: , Rfl:     Vitamin D3, Cholecalciferol, (VITAMIN D3) 125 MCG (5000 UT) Oral Cap, Take 1 capsule (5,000 Units total) by mouth daily., Disp: , Rfl:     MULTIPLE VITAMIN OR, Take 1 tablet by mouth daily., Disp: , Rfl:     cefadroxil 500 MG Oral Cap, Take 1 capsule (500 mg total) by mouth 2 (two) times daily. (Patient not taking: Reported on 7/24/2025), Disp: 30 capsule, Rfl: 0    benzonatate 100 MG Oral Cap, Take 1 capsule (100 mg total) by mouth 3 (three) times daily as needed for cough. (Patient not taking: Reported on 2/3/2025), Disp: 12 capsule, Rfl: 0    diazePAM 10 MG Oral Tab, Place 1 tablet (10 mg total) vaginally nightly. (Patient not taking: Reported on 2/3/2025), Disp: 30 tablet, Rfl: 2     Allergies:   Allergies   Allergen Reactions    Doxycycline NAUSEA AND VOMITING    Hydrocodone NAUSEA AND VOMITING     Narco    Tree Nuts OTHER (SEE COMMENTS)     Abdominal cramps       Social History:   Social History     Socioeconomic History    Marital status: Single   Tobacco Use    Smoking status: Former     Types: Cigarettes     Passive exposure: Never    Smokeless tobacco: Never    Tobacco comments:     Quit smoking 7 years ago, she does vaping occasional   Vaping Use    Vaping status: Every Day    Substances: Nicotine, Flavoring, 5%    Devices: Disposable, Pre-filled or refillable cartridge   Substance and Sexual Activity    Alcohol use: Not Currently    Drug use: Never    Sexual activity: Not Currently     Partners: Male     Birth control/protection: Injection   Other Topics Concern    Blood Transfusions No    Caffeine Concern Yes     Comment: coffee and soda  1 glass daily    Exercise No    Grew up on a farm No    History of tanning No    Outdoor occupation No    Breast feeding No    Reaction to local anesthetic No    Pt has a pacemaker No     Pt has a defibrillator No       Medical History:   Past Medical History:    Acne    I was about 16 years old when it started. Got worse in college ~2018    Allergic rhinitis    since childhood    Anxiety    Chronic migraine    Contact allergic reaction    Environmental allergies    Childhood    Esophageal reflux    had Colonoscopy    Hypothyroidism    Irritable bowel syndrome    runs in family    Kidney stone    Latent tuberculosis by blood test    Obesity    Scoliosis    slight scoliosis    TMJ (dislocation of temporomandibular joint)       Surgical history:   Past Surgical History:   Procedure Laterality Date    Colonoscopy  2021    Other surgical history  2017    wisdom teeth    [place] teeth removed         PHYSICAL EXAMINATION:  /89   Pulse 90   Wt 196 lb (88.9 kg)   LMP 10/26/2023   BMI 32.62 kg/m²     General Appearance:  Alert, in no acute distress, well developed  Eyes: normal conjunctivae, sclera.  Ears/Nose/Mouth/Throat/Neck:  normal hearing, normal speech and diffusely enlarged thyroid gland  Musculoskeletal:  normal muscle strength and tone  PV: normal pulses of carotids, pedals  Skin:  normal moisture and skin texture  Hair & Nails:  normal scalp hair     Neuro:  sensory grossly intact and motor grossly intact  Psychiatric:  oriented to time, self, and place  Nutritional:  no abnormal weight gain or loss    ASSESSMENT/PLAN:    1. Hypothyroidism   - Discussed diagnosis with patient  - Discussed nonspecific symptoms  - Continue Levothyroxine 150mcg PO daily   - Normal TFTs - recheck     2. Metabolic Syndrome  - Discussed diagnosis with patient, now improved   - No evidence of PCOS or Cushings Syndrome  -LIver US consistent with LAGUNAS  -Continue Wegovy 2.4mg subcutaneous weekly  -Discussed possible change to Zepbound in the future     3. LAGUNAS  - stable, improved  - Recheck Lipids, CMP     4. Secondary Amenorrhea  - Continue Depo Injection     5. Insulin Resistance  - Strong family h/o DM2  -  Signs of insulin resistance and glucose abnormality  - GLP-1 therapy as noted above    RTC  6 months     7/24/2025  Hamida Son MD

## 2025-08-05 ENCOUNTER — NURSE ONLY (OUTPATIENT)
Dept: OBGYN CLINIC | Facility: CLINIC | Age: 30
End: 2025-08-05

## 2025-08-05 DIAGNOSIS — Z30.42 ENCOUNTER FOR DEPO-PROVERA CONTRACEPTION: Primary | ICD-10-CM

## 2025-08-05 PROCEDURE — 96372 THER/PROPH/DIAG INJ SC/IM: CPT | Performed by: OBSTETRICS & GYNECOLOGY

## 2025-08-05 RX ORDER — MEDROXYPROGESTERONE ACETATE 150 MG/ML
150 INJECTION, SUSPENSION INTRAMUSCULAR ONCE
Status: COMPLETED | OUTPATIENT
Start: 2025-08-05 | End: 2025-08-05

## 2025-08-05 RX ADMIN — MEDROXYPROGESTERONE ACETATE 150 MG: 150 INJECTION, SUSPENSION INTRAMUSCULAR at 12:11:00

## 2025-08-21 ENCOUNTER — OFFICE VISIT (OUTPATIENT)
Dept: FAMILY MEDICINE CLINIC | Facility: CLINIC | Age: 30
End: 2025-08-21

## 2025-08-21 VITALS
OXYGEN SATURATION: 97 % | HEIGHT: 65 IN | BODY MASS INDEX: 33.29 KG/M2 | WEIGHT: 199.81 LBS | DIASTOLIC BLOOD PRESSURE: 76 MMHG | HEART RATE: 99 BPM | SYSTOLIC BLOOD PRESSURE: 121 MMHG

## 2025-08-21 DIAGNOSIS — Z00.00 ENCOUNTER FOR WELLNESS EXAMINATION IN ADULT: Primary | ICD-10-CM

## 2025-08-21 DIAGNOSIS — E66.9 OBESITY, UNSPECIFIED CLASS, UNSPECIFIED OBESITY TYPE, UNSPECIFIED WHETHER SERIOUS COMORBIDITY PRESENT: ICD-10-CM

## 2025-08-21 DIAGNOSIS — E03.9 HYPOTHYROIDISM, UNSPECIFIED TYPE: ICD-10-CM

## 2025-08-21 DIAGNOSIS — L40.0 PLAQUE PSORIASIS: ICD-10-CM

## 2025-08-21 DIAGNOSIS — G43.109 MIGRAINE WITH AURA AND WITHOUT STATUS MIGRAINOSUS, NOT INTRACTABLE: ICD-10-CM

## (undated) NOTE — LETTER
AUTHORIZATION FOR SURGICAL OPERATION OR OTHER PROCEDURE    1. I hereby authorize Dr. Jennifer Riddle, and [facility] staff assigned to my case to perform the following operation and/or procedure at the [facility] site:    _______________________________________________________________________________________________    Right hallux medial border partial nail avulsion   _______________________________________________________________________________________________    2.  My physician has explained the nature and purpose of the operation or other procedure, possible alternative methods of treatment, the risks involved, and the possibility of complication to me.  I acknowledge that no guarantee has been made as to the result that may be obtained.  3.  I recognize that, during the course of this operation, or other procedure, unforseen conditions may necessitate additional or different procedure than those listed above.  I, therefore, further authorize and request that the above named physician, his/her physician assistants or designees perform such procedures as are, in his/her professional opinion, necessary and desirable.  4.  Any tissue or organs removed in the operation or other procedure may be disposed of by and at the discretion of the [facility] and [facility].  5.  I understand that in the event of a medical emergency, I will be transported by local paramedics to [facility] or other hospital emergency department.  6.  I certify that I have read and fully understand the above consent to operation and/or other procedure.    7.  I acknowledge that my physician has explained sedation/analgesia administration to me including the risks and benefits.  I consent to the administration of sedation/analgesia as may be necessary or desirable in the judgement of my physician.    Witness signature: ___________________________________________________ Date:   ______/______/_____                    Time:  ________ A.M.  P.M.       Patient Name:  ______________________________________________________  (please print)      Patient signature:  ___________________________________________________             Relationship to Patient:           []  Parent    Responsible person                          []  Spouse  In case of minor or                    [] Other  _____________   Incompetent name:  __________________________________________________                               (please print)      _____________      Responsible person  In case of minor or  Incompetent signature:  _______________________________________________    Statement of Physician  My signature below affirms that prior to the time of the procedure, I have explained to the patient and/or his/her guardian, the risks and benefits involved in the proposed treatment and any reasonable alternative to the proposed treatment.  I have also explained the risks and benefits involved in the refusal of the proposed treatment and have answered the patient's questions.                        Date:  ______/______/_______  Provider                      Signature:  __________________________________________________________       Time:  ___________ A.M    P.M.

## (undated) NOTE — LETTER
Date: 11/4/2023    Patient Name: Minh Jansen          To Whom it may concern: This letter has been written at the patient's request. The above patient was seen at the [facility] for treatment of a medical condition. This patient should be excused from attending work/school from 11/4/2023 through 11/6/2023. The patient may return to work/school on 11/7/2023 with no limitations.       Sincerely,    ISAIAH Crenshaw

## (undated) NOTE — Clinical Note
Candi - I saw Essence today with UI. I've recommended PFPT & bowel mgmt. I will work to manage her urinary sx. I appreciate the opportunity to participate in her care. Thanks, Love

## (undated) NOTE — LETTER
6/2/2025          To Whom It May Concern:    Essence Borja is currently under my medical care and is being treated for significant orthopedic condition of her lower extremity.  Due to her restrictions, it is my recommendation that she limits her ambulation while at work to only necessary activities.  If accommodations can be made for patient to primarily be at her desk in order to limit the amount of stress across her lower extremity would be greatly appreciated.  Restrictions will be lifted in 4 weeks pending patient's course.    If you require additional information please contact our office.        Sincerely,      Janessa Acuna DPM, MARYBETH.WISAM FACJOSE DAVID  Diplomat, American Board of Foot and Ankle Surgery  Certified in Foot and Rearfoot/Ankle Reconstruction  Fellow of the American College of Foot and Ankle Surgeons  Fellowship Trained Foot and Ankle Surgeon   [facility]

## (undated) NOTE — LETTER
7/21/2025          To Whom It May Concern:    Essence Borja is currently under my medical care and may not return to work 7/22/2025 as she underwent an procedure in the office today of her lower extremities.    If you require additional information please contact our office.        Sincerely,        Janessa Acuna DPM, MARYBETH.JAMISON JOEL  Diplomat, American Board of Foot and Ankle Surgery  Certified in Foot and Rearfoot/Ankle Reconstruction  Fellow of the American College of Foot and Ankle Surgeons  Fellowship Trained Foot and Ankle Surgeon   [facility] [place]

## (undated) NOTE — LETTER
Patient Name: Essence Borja  YOB: 1995          MRN :  [id number]  Date:  11/27/2024  Referring Physician:  Love Tamayo    Discharge Summary  Pt has attended 7 visits in Physical Therapy.     Diagnosis:   Post-void dribbling (N39.43)  Urge incontinence (N39.41)  Female stress incontinence (N39.3)  Pelvic muscle wasting (N81.84)       Referring Provider: Love Tamayo  Date of Evaluation:    10/14/24     Precautions:  None Next MD visit:   none scheduled  Date of Surgery: n/a   Insurance Primary/Secondary: [employer] [place] HMO / N/A     # Auth Visits: 8v thru 12/31            Subjective: Pt reports symptoms are about the same, was constipated the other day and was getting lower abdominal pain towards vagina, that has mostly subsided. Hip pain is not too bad this week. Pt requesting to discharge today due to scheduling issues throughout December. Pt stated she made an appt with PCP to review medications.     Pain: 6/10 pelvic pain      Objective: see chart below  Pt given informational handout about Lifestyle Nutrition through the [facility] through [facility]       Assessment: Pt reports overall no significant change in symptoms since starting PT. Reviewed goals, intervention trials performed and outcomes, and home program. Focused on stretching and basic strengthening strategies pt can work on to continue to address symptoms and overall general physical health, with no increased pain with exercise trials. Pt with some overall difficulty with consistency, may benefit from trial of yoga 10-15 min 3x/wk, or working with a personal training to start a strengthening program, in an attempt to increase HEP adherence. Pt would benefit from reviewing medications with PCP team, as constipation and fatigue have been a barrier to progress. Pt may also benefit from working on nutrition component with an RN or with lifestyle nutrition through integrative med clinic. Pt requesting  discharge at this time.       Goals: (to be met in 10-12 visits)  Patient instructed on bladder irritants, increased water intake to 64 ounces /day (not met)     Patient to report urinary frequency to every 2-4 hours to allow for independent ADLs (MET)  Patient reports a reduction in CARMEN from 3x/ day to 0-1x/ day resulting in no need for pad use. (not met)    Patient is able to perform Levator Ani contraction inverse to diaphragmatic breathing to allow for pelvic brace with ADLs without valsalva. (not met)     Patient exhibits an increase in pelvic floor strength from 2/5 with 3 count hold to 4/5  with 10 count hold to allow for pelvic brace with ADLs. (not met)     Patient reports change in [place] stool to #4 with daily bowel moment without straining as preventative measure for hemorrhoids and or pelvic organ prolapse. (not met)     Patient understands importance of performing HEP to prevent reoccurrence of symptoms. (not met)    Pt reports pelvic region pain decreased by 50% with ADLs. (not met)    Plan: discharge  Date: 11/4/24               TX#: 4 Date: 11/13/24              TX#: 5 Date: 11/20/24  Tx#: 6 Date: 11/27/24  Tx#: 7   Manual: 30 min  STM/MFR PFMs layers 1-3  Bowel massage  STM/MFR to B post hip and lumbopelvic region Manual: 20 min  Bowel massage, dynamic cupping to abdominals Theract: 45 min  Reviewed home program, educated pt on potential benefits of pelvic wand use, pt ed on NS downregulation approach to reducing PFM tension, pt ed on alternative resources to potentially address outstanding symptoms Theract: 15 min  Reviewed progress toward goals and outstanding deficits, rec lifestyle nutrition with intergrative medicine clinic, rec for general exercise  Reviewed PFMs with model, function, and how other systems can impact normal PFM functional   Therex: 10 min  Cat cow x10  Clams R/L x10  Sidelying hip abd R/L x10 Therex: 10 min  Open book stretch R/L x10  Seated thoracic ext x10    Therex: 25  min  Shuttle DL 5c x10, SL 5c x10  Standing B calf stretch x1 min  Standing SL calf stretch x30\" ea  Hamstring stretch green strap R/L x30\"  Pilates ring hamstring stretch, R/L x30\"  Sit<>stand with db at chest height 10# x8, 15# x8, 20# x8    Theract: 10 min  Pt ed on common side effects of Wegovy, re-ed on importance of water intake and hydration on BM regularity, and on impact of constipation on CARMEN/UUI symptoms           HEP: Access Code: [email or web address]  URL: [email or web address]  Date: 11/13/2024  Prepared by: Janay Urbano  Exercises  - Supine Butterfly Groin Stretch  - 1 x daily - 7 x weekly - 1 sets - 10 reps  - Cat Cow  - 1 x daily - 7 x weekly - 1 sets - 10 reps  - Sidelying Open Book Thoracic Lumbar Rotation and Extension  - 1 x daily - 7 x weekly - 1 sets - 10 reps  - Seated Thoracic Extension with Hands Behind Neck  - 1-5 x daily - 7 x weekly - 1 sets - 3 reps - 5 sec hold    Charges: 1 TA, 2 TE       Total Timed Treatment: 40 min  Total Treatment Time: 45 min    Plan: Discharge with HEP. Patient was instructed to follow up with their physician should an exacerbation of symptoms arise.     Thank you for your referral. If you have any questions, please contact me at Dept: [phone].    Sincerely,  Electronically signed by therapist: Janay Urbano, PT     Physician's certification required:  No                  21st Century Cures Act Notice to Patient: Medical documents like this are made available to patients in the interest of transparency. However, be advised this is a medical document and it is intended as [id number] communication between your medical providers. This medical document may contain abbreviations, assessments, medical data, and results or other terms that are unfamiliar. Medical documents are intended to carry relevant information, facts as evident, and the clinical opinion of the practitioner. As such, this medical document may be written in language that appears blunt or  direct. You are encouraged to contact your medical provider and/or [facility] Experience if you have any questions about this medical document.

## (undated) NOTE — LETTER
Date: 11/4/2023    Patient Name: Landry Cisneros        To Whom it may concern: This letter has been written at the patient's request. The above patient was seen at the [facility] for treatment of a medical condition. This patient should be excused from attending work/school from 11/4/2023 through 11/6/2023. The patient may return to work/school on 11/7/2023 with no limitations.         Sincerely,    ISAIAH Sol

## (undated) NOTE — LETTER
7/21/2025          To Whom It May Concern:    Essence Borja is currently under my medical care and is being treated for significant orthopedic condition of her lower extremity.  Due to her restrictions, it is my recommendation that she limits her ambulation while at work to only necessary activities.  If accommodations can be made for patient to primarily be at her desk in order to limit the amount of stress across her lower extremity would be greatly appreciated.  Restrictions will be lifted in 4 weeks pending patient's course.    If you require additional information please contact our office.        Sincerely,      Janessa Acuna DPM, MARYBETH.WISAM FACJOSE DAVID  Diplomat, American Board of Foot and Ankle Surgery  Certified in Foot and Rearfoot/Ankle Reconstruction  Fellow of the American College of Foot and Ankle Surgeons  Fellowship Trained Foot and Ankle Surgeon   [facility]